# Patient Record
Sex: FEMALE | Race: WHITE | NOT HISPANIC OR LATINO | Employment: UNEMPLOYED | URBAN - METROPOLITAN AREA
[De-identification: names, ages, dates, MRNs, and addresses within clinical notes are randomized per-mention and may not be internally consistent; named-entity substitution may affect disease eponyms.]

---

## 2017-08-29 ENCOUNTER — LAB (OUTPATIENT)
Dept: LAB | Facility: HOSPITAL | Age: 62
End: 2017-08-29

## 2017-08-29 ENCOUNTER — TRANSCRIBE ORDERS (OUTPATIENT)
Dept: LAB | Facility: HOSPITAL | Age: 62
End: 2017-08-29

## 2017-08-29 ENCOUNTER — INFUSION (OUTPATIENT)
Dept: ONCOLOGY | Facility: HOSPITAL | Age: 62
End: 2017-08-29

## 2017-08-29 VITALS
TEMPERATURE: 96.3 F | BODY MASS INDEX: 41.64 KG/M2 | WEIGHT: 235 LBS | DIASTOLIC BLOOD PRESSURE: 77 MMHG | SYSTOLIC BLOOD PRESSURE: 152 MMHG | HEIGHT: 63 IN | HEART RATE: 94 BPM | RESPIRATION RATE: 18 BRPM

## 2017-08-29 DIAGNOSIS — K57.30 DIVERTICULA OF INTESTINE: Primary | ICD-10-CM

## 2017-08-29 DIAGNOSIS — E13.43 DIABETIC AUTONOMIC NEUROPATHY ASSOCIATED WITH OTHER SPECIFIED DIABETES MELLITUS (HCC): ICD-10-CM

## 2017-08-29 DIAGNOSIS — E13.8 DIABETES MELLITUS OF OTHER TYPE WITH COMPLICATION: ICD-10-CM

## 2017-08-29 DIAGNOSIS — Z88.0 PERSONAL HISTORY OF ALLERGY TO PENICILLIN: Primary | ICD-10-CM

## 2017-08-29 DIAGNOSIS — K57.32 DIVERTICULITIS OF COLON (WITHOUT MENTION OF HEMORRHAGE)(562.11): ICD-10-CM

## 2017-08-29 DIAGNOSIS — E66.01 MORBID OBESITY, UNSPECIFIED OBESITY TYPE (HCC): ICD-10-CM

## 2017-08-29 DIAGNOSIS — Z88.0 PERSONAL HISTORY OF ALLERGY TO PENICILLIN: ICD-10-CM

## 2017-08-29 LAB
ALBUMIN SERPL-MCNC: 4 G/DL (ref 3.2–4.8)
ALBUMIN/GLOB SERPL: 1.5 G/DL (ref 1.5–2.5)
ALP SERPL-CCNC: 119 U/L (ref 25–100)
ALT SERPL W P-5'-P-CCNC: 13 U/L (ref 7–40)
ANION GAP SERPL CALCULATED.3IONS-SCNC: 5 MMOL/L (ref 3–11)
AST SERPL-CCNC: 16 U/L (ref 0–33)
BASOPHILS # BLD AUTO: 0.04 10*3/MM3 (ref 0–0.2)
BASOPHILS NFR BLD AUTO: 0.5 % (ref 0–1)
BILIRUB SERPL-MCNC: 0.2 MG/DL (ref 0.3–1.2)
BUN BLD-MCNC: 13 MG/DL (ref 9–23)
BUN/CREAT SERPL: 21.7 (ref 7–25)
CALCIUM SPEC-SCNC: 9.1 MG/DL (ref 8.7–10.4)
CHLORIDE SERPL-SCNC: 103 MMOL/L (ref 99–109)
CO2 SERPL-SCNC: 29 MMOL/L (ref 20–31)
CREAT BLD-MCNC: 0.6 MG/DL (ref 0.6–1.3)
CRP SERPL-MCNC: 0.96 MG/DL (ref 0–1)
DEPRECATED RDW RBC AUTO: 46.7 FL (ref 37–54)
EOSINOPHIL # BLD AUTO: 0.39 10*3/MM3 (ref 0–0.3)
EOSINOPHIL NFR BLD AUTO: 5.3 % (ref 0–3)
ERYTHROCYTE [DISTWIDTH] IN BLOOD BY AUTOMATED COUNT: 15.2 % (ref 11.3–14.5)
ERYTHROCYTE [SEDIMENTATION RATE] IN BLOOD: 25 MM/HR (ref 0–30)
GFR SERPL CREATININE-BSD FRML MDRD: 101 ML/MIN/1.73
GLOBULIN UR ELPH-MCNC: 2.7 GM/DL
GLUCOSE BLD-MCNC: 119 MG/DL (ref 70–100)
HCT VFR BLD AUTO: 39.3 % (ref 34.5–44)
HGB BLD-MCNC: 12.2 G/DL (ref 11.5–15.5)
IMM GRANULOCYTES # BLD: 0.01 10*3/MM3 (ref 0–0.03)
IMM GRANULOCYTES NFR BLD: 0.1 % (ref 0–0.6)
LYMPHOCYTES # BLD AUTO: 2.5 10*3/MM3 (ref 0.6–4.8)
LYMPHOCYTES NFR BLD AUTO: 34.2 % (ref 24–44)
MCH RBC QN AUTO: 25.9 PG (ref 27–31)
MCHC RBC AUTO-ENTMCNC: 31 G/DL (ref 32–36)
MCV RBC AUTO: 83.4 FL (ref 80–99)
MONOCYTES # BLD AUTO: 0.52 10*3/MM3 (ref 0–1)
MONOCYTES NFR BLD AUTO: 7.1 % (ref 0–12)
NEUTROPHILS # BLD AUTO: 3.84 10*3/MM3 (ref 1.5–8.3)
NEUTROPHILS NFR BLD AUTO: 52.8 % (ref 41–71)
PLATELET # BLD AUTO: 352 10*3/MM3 (ref 150–450)
PMV BLD AUTO: 9 FL (ref 6–12)
POTASSIUM BLD-SCNC: 4.3 MMOL/L (ref 3.5–5.5)
PROT SERPL-MCNC: 6.7 G/DL (ref 5.7–8.2)
RBC # BLD AUTO: 4.71 10*6/MM3 (ref 3.89–5.14)
SODIUM BLD-SCNC: 137 MMOL/L (ref 132–146)
WBC NRBC COR # BLD: 7.3 10*3/MM3 (ref 3.5–10.8)

## 2017-08-29 PROCEDURE — 86140 C-REACTIVE PROTEIN: CPT | Performed by: INTERNAL MEDICINE

## 2017-08-29 PROCEDURE — G0463 HOSPITAL OUTPT CLINIC VISIT: HCPCS

## 2017-08-29 PROCEDURE — 85652 RBC SED RATE AUTOMATED: CPT | Performed by: INTERNAL MEDICINE

## 2017-08-29 PROCEDURE — 85025 COMPLETE CBC W/AUTO DIFF WBC: CPT | Performed by: INTERNAL MEDICINE

## 2017-08-29 PROCEDURE — 36415 COLL VENOUS BLD VENIPUNCTURE: CPT

## 2017-08-29 PROCEDURE — 80053 COMPREHEN METABOLIC PANEL: CPT | Performed by: INTERNAL MEDICINE

## 2017-08-29 RX ORDER — DULOXETIN HYDROCHLORIDE 30 MG/1
30 CAPSULE, DELAYED RELEASE ORAL DAILY
COMMUNITY
End: 2022-12-07

## 2017-08-29 RX ORDER — ATORVASTATIN CALCIUM 20 MG/1
20 TABLET, FILM COATED ORAL DAILY
COMMUNITY

## 2017-08-29 RX ORDER — CETIRIZINE HYDROCHLORIDE 10 MG/1
10 TABLET ORAL DAILY
COMMUNITY
End: 2022-03-18

## 2017-09-05 ENCOUNTER — LAB (OUTPATIENT)
Dept: LAB | Facility: HOSPITAL | Age: 62
End: 2017-09-05
Attending: INTERNAL MEDICINE

## 2017-09-05 DIAGNOSIS — K57.32 DIVERTICULITIS OF COLON (WITHOUT MENTION OF HEMORRHAGE)(562.11): Primary | ICD-10-CM

## 2017-09-05 LAB
ALBUMIN SERPL-MCNC: 4.2 G/DL (ref 3.2–4.8)
ALBUMIN/GLOB SERPL: 1.5 G/DL (ref 1.5–2.5)
ALP SERPL-CCNC: 116 U/L (ref 25–100)
ALT SERPL W P-5'-P-CCNC: 17 U/L (ref 7–40)
ANION GAP SERPL CALCULATED.3IONS-SCNC: 10 MMOL/L (ref 3–11)
AST SERPL-CCNC: 20 U/L (ref 0–33)
BASOPHILS # BLD AUTO: 0.06 10*3/MM3 (ref 0–0.2)
BASOPHILS NFR BLD AUTO: 0.7 % (ref 0–1)
BILIRUB SERPL-MCNC: 0.2 MG/DL (ref 0.3–1.2)
BUN BLD-MCNC: 15 MG/DL (ref 9–23)
BUN/CREAT SERPL: 21.4 (ref 7–25)
CALCIUM SPEC-SCNC: 9.4 MG/DL (ref 8.7–10.4)
CHLORIDE SERPL-SCNC: 100 MMOL/L (ref 99–109)
CO2 SERPL-SCNC: 25 MMOL/L (ref 20–31)
CREAT BLD-MCNC: 0.7 MG/DL (ref 0.6–1.3)
CRP SERPL-MCNC: 1.01 MG/DL (ref 0–1)
DEPRECATED RDW RBC AUTO: 45.8 FL (ref 37–54)
EOSINOPHIL # BLD AUTO: 0.44 10*3/MM3 (ref 0–0.3)
EOSINOPHIL NFR BLD AUTO: 5.1 % (ref 0–3)
ERYTHROCYTE [DISTWIDTH] IN BLOOD BY AUTOMATED COUNT: 15.2 % (ref 11.3–14.5)
ERYTHROCYTE [SEDIMENTATION RATE] IN BLOOD: 28 MM/HR (ref 0–30)
GFR SERPL CREATININE-BSD FRML MDRD: 85 ML/MIN/1.73
GLOBULIN UR ELPH-MCNC: 2.8 GM/DL
GLUCOSE BLD-MCNC: 160 MG/DL (ref 70–100)
HCT VFR BLD AUTO: 40.7 % (ref 34.5–44)
HGB BLD-MCNC: 12.6 G/DL (ref 11.5–15.5)
IMM GRANULOCYTES # BLD: 0.02 10*3/MM3 (ref 0–0.03)
IMM GRANULOCYTES NFR BLD: 0.2 % (ref 0–0.6)
LYMPHOCYTES # BLD AUTO: 2.49 10*3/MM3 (ref 0.6–4.8)
LYMPHOCYTES NFR BLD AUTO: 28.7 % (ref 24–44)
MCH RBC QN AUTO: 25.6 PG (ref 27–31)
MCHC RBC AUTO-ENTMCNC: 31 G/DL (ref 32–36)
MCV RBC AUTO: 82.7 FL (ref 80–99)
MONOCYTES # BLD AUTO: 0.6 10*3/MM3 (ref 0–1)
MONOCYTES NFR BLD AUTO: 6.9 % (ref 0–12)
NEUTROPHILS # BLD AUTO: 5.06 10*3/MM3 (ref 1.5–8.3)
NEUTROPHILS NFR BLD AUTO: 58.4 % (ref 41–71)
PLATELET # BLD AUTO: 372 10*3/MM3 (ref 150–450)
PMV BLD AUTO: 9.1 FL (ref 6–12)
POTASSIUM BLD-SCNC: 4.4 MMOL/L (ref 3.5–5.5)
PROT SERPL-MCNC: 7 G/DL (ref 5.7–8.2)
RBC # BLD AUTO: 4.92 10*6/MM3 (ref 3.89–5.14)
SODIUM BLD-SCNC: 135 MMOL/L (ref 132–146)
WBC NRBC COR # BLD: 8.67 10*3/MM3 (ref 3.5–10.8)

## 2017-09-05 PROCEDURE — 85652 RBC SED RATE AUTOMATED: CPT | Performed by: INTERNAL MEDICINE

## 2017-09-05 PROCEDURE — 80053 COMPREHEN METABOLIC PANEL: CPT | Performed by: INTERNAL MEDICINE

## 2017-09-05 PROCEDURE — 85025 COMPLETE CBC W/AUTO DIFF WBC: CPT | Performed by: INTERNAL MEDICINE

## 2017-09-05 PROCEDURE — 86140 C-REACTIVE PROTEIN: CPT | Performed by: INTERNAL MEDICINE

## 2017-09-05 PROCEDURE — 36415 COLL VENOUS BLD VENIPUNCTURE: CPT

## 2022-03-18 ENCOUNTER — OFFICE VISIT (OUTPATIENT)
Dept: PULMONOLOGY | Facility: CLINIC | Age: 67
End: 2022-03-18

## 2022-03-18 VITALS
OXYGEN SATURATION: 97 % | WEIGHT: 257 LBS | BODY MASS INDEX: 45.54 KG/M2 | DIASTOLIC BLOOD PRESSURE: 66 MMHG | HEIGHT: 63 IN | TEMPERATURE: 97.8 F | HEART RATE: 66 BPM | SYSTOLIC BLOOD PRESSURE: 122 MMHG

## 2022-03-18 DIAGNOSIS — R06.02 SHORTNESS OF BREATH: Primary | ICD-10-CM

## 2022-03-18 DIAGNOSIS — R91.8 PULMONARY INFILTRATES: ICD-10-CM

## 2022-03-18 DIAGNOSIS — J45.40 MODERATE PERSISTENT ASTHMATIC BRONCHITIS WITHOUT COMPLICATION: ICD-10-CM

## 2022-03-18 DIAGNOSIS — E66.01 OBESITY, MORBID: ICD-10-CM

## 2022-03-18 PROCEDURE — 99204 OFFICE O/P NEW MOD 45 MIN: CPT | Performed by: NURSE PRACTITIONER

## 2022-03-18 RX ORDER — FEXOFENADINE HCL 180 MG/1
180 TABLET ORAL DAILY
Qty: 30 TABLET | Refills: 5 | Status: SHIPPED | OUTPATIENT
Start: 2022-03-18

## 2022-03-18 RX ORDER — INSULIN GLARGINE 300 U/ML
INJECTION, SOLUTION SUBCUTANEOUS
COMMUNITY
Start: 2022-02-21

## 2022-03-18 RX ORDER — OMEPRAZOLE 40 MG/1
CAPSULE, DELAYED RELEASE ORAL
COMMUNITY
Start: 2022-03-12

## 2022-03-18 RX ORDER — MONTELUKAST SODIUM 10 MG/1
10 TABLET ORAL NIGHTLY
Qty: 30 TABLET | Refills: 5 | Status: SHIPPED | OUTPATIENT
Start: 2022-03-18 | End: 2023-01-17

## 2022-03-18 RX ORDER — NITROGLYCERIN 400 UG/1
SPRAY ORAL
COMMUNITY
End: 2022-07-18 | Stop reason: SDUPTHER

## 2022-03-18 RX ORDER — ALBUTEROL SULFATE 90 UG/1
2 AEROSOL, METERED RESPIRATORY (INHALATION) EVERY 4 HOURS PRN
Qty: 18 G | Refills: 11 | Status: SHIPPED | OUTPATIENT
Start: 2022-03-18 | End: 2022-08-29 | Stop reason: SDUPTHER

## 2022-03-18 RX ORDER — LISINOPRIL 40 MG/1
TABLET ORAL
COMMUNITY
End: 2022-08-15

## 2022-03-18 NOTE — PROGRESS NOTES
"Chief Complaint  Shortness of Breath    Subjective          Josefina Cheng presents to [facility] PULMONARY & CRITICAL CARE MEDICINE  History of Present Illness     Ms. Cheng is a 67 year old female with a medical history significant for diabetes, hypertension, and GERD.    She presents for evaluation of shortness of breath.  She reports that she has had shortness of breath for many years.  She tells me that she has had COVID in August 2021 and it has gotten worse.  She reports that it is worse with exertion.  She is also currently seeing cardiology.  She reports that she has been diagnosed with asthmatic bronchitis and allergies.  She quit smoking about 12 years ago.  She is currently taking Symbicort BID.          Objective   Vital Signs:   /66   Pulse 66   Temp 97.8 °F (36.6 °C) (Temporal)   Ht 158.8 cm (62.5\")   Wt 117 kg (257 lb)   SpO2 97%   BMI 46.26 kg/m²     Physical Exam     GENERAL APPEARANCE: Well developed, well nourished, alert and cooperative, and appears to be in no acute distress.    HEAD: normocephalic. Atraumatic.    EYES: PERRL, EOMI. Vision is grossly intact.    THROAT: Oral cavity and pharynx normal. No inflammation, swelling, exudate, or lesions.     NECK: Neck supple.  No thyromegaly.    CARDIAC: Normal S1 and S2. No S3, S4 or murmurs. Rhythm is regular.     RESPIRATORY:Bilateral air entry positive. Bilateral diminished breath sounds. No wheezing, crackles or rhonchi noted.    GI: Positive bowel sounds. Soft, nondistended, nontender.     MUSCULOSKELETAL: No significant deformity or joint abnormality. No edema. Peripheral pulses intact. No varicosities.    NEUROLOGICAL: Strength and sensation symmetric and intact throughout.     PSYCHIATRIC: The mental examination revealed the patient was oriented to person, place, and time.     Result Review :            Assessment and Plan    Diagnoses and all orders for this visit:    1. Shortness of breath (Primary)  -     CBC & " Differential; Future  -     Comprehensive Metabolic Panel; Future  -     IgE Level; Future  -     Full Pulmonary Function Test With Bronchodilator; Future    2. Obesity, morbid (HCC)    3. Pulmonary infiltrates  -     CT Chest Without Contrast; Future    4. Moderate persistent asthmatic bronchitis without complication    Other orders  -     fexofenadine (Allegra Allergy) 180 MG tablet; Take 1 tablet by mouth Daily.  Dispense: 30 tablet; Refill: 5  -     albuterol sulfate  (90 Base) MCG/ACT inhaler; Inhale 2 puffs Every 4 (Four) Hours As Needed for Wheezing.  Dispense: 18 g; Refill: 11  -     montelukast (SINGULAIR) 10 MG tablet; Take 1 tablet by mouth Every Night.  Dispense: 30 tablet; Refill: 5         Asthmatic Bronchitis/Shortness of breath:  -Ordered PFT to assess lung function.  -Will start her on albuterol every 4 hours as needed.    -continue Symbicort BID.  -Will start her on allegra once daily and encouraged her to use Flonase daily.  -Will also start her on Singulair nightly.      -Ordered CBC, CMP and IgE level.      Chest xray and CT Chest from August/September 2021 were reviewed.  We will order repeat CT Chest to assess resolution of pulmonary infiltrates.]    Patient's Body mass index is 46.26 kg/m². indicating that she is morbidly obese (BMI > 40 or > 35 with obesity - related health condition). Obesity-related health conditions include the following: hypertension and diabetes mellitus. Obesity is unchanged. BMI is is above average; BMI management plan is completed. We discussed portion control and increasing exercise..                        Follow Up   Return in about 3 months (around 6/18/2022).  Patient was given instructions and counseling regarding her condition or for health maintenance advice. Please see specific information pulled into the AVS if appropriate.

## 2022-03-22 ENCOUNTER — LAB (OUTPATIENT)
Dept: LAB | Facility: HOSPITAL | Age: 67
End: 2022-03-22

## 2022-03-22 DIAGNOSIS — R06.02 SHORTNESS OF BREATH: ICD-10-CM

## 2022-03-22 PROCEDURE — 80053 COMPREHEN METABOLIC PANEL: CPT

## 2022-03-22 PROCEDURE — 82785 ASSAY OF IGE: CPT

## 2022-03-22 PROCEDURE — 85025 COMPLETE CBC W/AUTO DIFF WBC: CPT

## 2022-03-22 PROCEDURE — 36415 COLL VENOUS BLD VENIPUNCTURE: CPT

## 2022-03-23 DIAGNOSIS — Z01.818 OTHER SPECIFIED PRE-OPERATIVE EXAMINATION: Primary | ICD-10-CM

## 2022-03-23 LAB
ALBUMIN SERPL-MCNC: 4.1 G/DL (ref 3.5–5.2)
ALBUMIN/GLOB SERPL: 1.5 G/DL
ALP SERPL-CCNC: 61 U/L (ref 39–117)
ALT SERPL W P-5'-P-CCNC: 17 U/L (ref 1–33)
ANION GAP SERPL CALCULATED.3IONS-SCNC: 10.5 MMOL/L (ref 5–15)
AST SERPL-CCNC: 19 U/L (ref 1–32)
BASOPHILS # BLD AUTO: 0.04 10*3/MM3 (ref 0–0.2)
BASOPHILS NFR BLD AUTO: 0.5 % (ref 0–1.5)
BILIRUB SERPL-MCNC: 0.2 MG/DL (ref 0–1.2)
BUN SERPL-MCNC: 9 MG/DL (ref 8–23)
BUN/CREAT SERPL: 11 (ref 7–25)
CALCIUM SPEC-SCNC: 9.3 MG/DL (ref 8.6–10.5)
CHLORIDE SERPL-SCNC: 94 MMOL/L (ref 98–107)
CO2 SERPL-SCNC: 30.5 MMOL/L (ref 22–29)
CREAT SERPL-MCNC: 0.82 MG/DL (ref 0.57–1)
DEPRECATED RDW RBC AUTO: 43.7 FL (ref 37–54)
EGFRCR SERPLBLD CKD-EPI 2021: 78 ML/MIN/1.73
EOSINOPHIL # BLD AUTO: 0.17 10*3/MM3 (ref 0–0.4)
EOSINOPHIL NFR BLD AUTO: 2 % (ref 0.3–6.2)
ERYTHROCYTE [DISTWIDTH] IN BLOOD BY AUTOMATED COUNT: 14.7 % (ref 12.3–15.4)
GLOBULIN UR ELPH-MCNC: 2.8 GM/DL
GLUCOSE SERPL-MCNC: 82 MG/DL (ref 65–99)
HCT VFR BLD AUTO: 39.3 % (ref 34–46.6)
HGB BLD-MCNC: 11.9 G/DL (ref 12–15.9)
IMM GRANULOCYTES # BLD AUTO: 0.03 10*3/MM3 (ref 0–0.05)
IMM GRANULOCYTES NFR BLD AUTO: 0.4 % (ref 0–0.5)
LYMPHOCYTES # BLD AUTO: 2.22 10*3/MM3 (ref 0.7–3.1)
LYMPHOCYTES NFR BLD AUTO: 26.5 % (ref 19.6–45.3)
MCH RBC QN AUTO: 24.7 PG (ref 26.6–33)
MCHC RBC AUTO-ENTMCNC: 30.3 G/DL (ref 31.5–35.7)
MCV RBC AUTO: 81.5 FL (ref 79–97)
MONOCYTES # BLD AUTO: 0.46 10*3/MM3 (ref 0.1–0.9)
MONOCYTES NFR BLD AUTO: 5.5 % (ref 5–12)
NEUTROPHILS NFR BLD AUTO: 5.47 10*3/MM3 (ref 1.7–7)
NEUTROPHILS NFR BLD AUTO: 65.1 % (ref 42.7–76)
NRBC BLD AUTO-RTO: 0 /100 WBC (ref 0–0.2)
PLATELET # BLD AUTO: 458 10*3/MM3 (ref 140–450)
PMV BLD AUTO: 9.4 FL (ref 6–12)
POTASSIUM SERPL-SCNC: 5.2 MMOL/L (ref 3.5–5.2)
PROT SERPL-MCNC: 6.9 G/DL (ref 6–8.5)
RBC # BLD AUTO: 4.82 10*6/MM3 (ref 3.77–5.28)
SODIUM SERPL-SCNC: 135 MMOL/L (ref 136–145)
WBC NRBC COR # BLD: 8.39 10*3/MM3 (ref 3.4–10.8)

## 2022-03-27 LAB — IGE SERPL-ACNC: 3 IU/ML (ref 6–495)

## 2022-03-31 ENCOUNTER — DOCUMENTATION (OUTPATIENT)
Dept: PULMONOLOGY | Facility: CLINIC | Age: 67
End: 2022-03-31

## 2022-03-31 NOTE — PROGRESS NOTES
Discussed lab work with the patient in detail.  She is a candidate for a biologic for her asthma, as it is still uncontrolled.  We will start approval process for nucala.

## 2022-04-12 ENCOUNTER — HOSPITAL ENCOUNTER (OUTPATIENT)
Dept: CT IMAGING | Facility: HOSPITAL | Age: 67
Discharge: HOME | End: 2022-04-12

## 2022-04-12 ENCOUNTER — HOSPITAL ENCOUNTER (OUTPATIENT)
Dept: RESPIRATORY THERAPY | Facility: HOSPITAL | Age: 67
Discharge: HOME | End: 2022-04-12

## 2022-04-12 DIAGNOSIS — R91.8 PULMONARY INFILTRATES: ICD-10-CM

## 2022-04-12 DIAGNOSIS — R06.02 SHORTNESS OF BREATH: ICD-10-CM

## 2022-04-12 PROCEDURE — 71250 CT THORAX DX C-: CPT | Performed by: RADIOLOGY

## 2022-04-12 PROCEDURE — 94060 EVALUATION OF WHEEZING: CPT | Performed by: INTERNAL MEDICINE

## 2022-04-12 PROCEDURE — 94060 EVALUATION OF WHEEZING: CPT

## 2022-04-12 PROCEDURE — 94726 PLETHYSMOGRAPHY LUNG VOLUMES: CPT

## 2022-04-12 PROCEDURE — 94729 DIFFUSING CAPACITY: CPT

## 2022-04-12 PROCEDURE — 71250 CT THORAX DX C-: CPT

## 2022-04-12 PROCEDURE — 94729 DIFFUSING CAPACITY: CPT | Performed by: INTERNAL MEDICINE

## 2022-04-12 PROCEDURE — 94640 AIRWAY INHALATION TREATMENT: CPT

## 2022-04-12 PROCEDURE — 94726 PLETHYSMOGRAPHY LUNG VOLUMES: CPT | Performed by: INTERNAL MEDICINE

## 2022-04-12 RX ORDER — ALBUTEROL SULFATE 2.5 MG/3ML
2.5 SOLUTION RESPIRATORY (INHALATION) ONCE
Status: COMPLETED | OUTPATIENT
Start: 2022-04-12 | End: 2022-04-12

## 2022-04-12 RX ADMIN — ALBUTEROL SULFATE 2.5 MG: 2.5 SOLUTION RESPIRATORY (INHALATION) at 13:15

## 2022-04-22 ENCOUNTER — DOCUMENTATION (OUTPATIENT)
Dept: PULMONOLOGY | Facility: CLINIC | Age: 67
End: 2022-04-22

## 2022-05-23 ENCOUNTER — OFFICE VISIT (OUTPATIENT)
Dept: PULMONOLOGY | Facility: CLINIC | Age: 67
End: 2022-05-23

## 2022-05-23 VITALS
OXYGEN SATURATION: 97 % | HEIGHT: 63 IN | DIASTOLIC BLOOD PRESSURE: 82 MMHG | TEMPERATURE: 97.5 F | SYSTOLIC BLOOD PRESSURE: 122 MMHG | BODY MASS INDEX: 46.78 KG/M2 | WEIGHT: 264 LBS | HEART RATE: 88 BPM

## 2022-05-23 DIAGNOSIS — Z87.891 FORMER SMOKER: ICD-10-CM

## 2022-05-23 DIAGNOSIS — J96.11 CHRONIC RESPIRATORY FAILURE WITH HYPOXIA: ICD-10-CM

## 2022-05-23 DIAGNOSIS — J45.40 MODERATE PERSISTENT ASTHMATIC BRONCHITIS WITHOUT COMPLICATION: Primary | ICD-10-CM

## 2022-05-23 PROCEDURE — 99214 OFFICE O/P EST MOD 30 MIN: CPT | Performed by: PHYSICIAN ASSISTANT

## 2022-05-23 RX ORDER — IPRATROPIUM BROMIDE AND ALBUTEROL SULFATE 2.5; .5 MG/3ML; MG/3ML
3 SOLUTION RESPIRATORY (INHALATION) 4 TIMES DAILY PRN
Qty: 360 ML | Refills: 8 | Status: SHIPPED | OUTPATIENT
Start: 2022-05-23

## 2022-05-23 RX ORDER — GUAIFENESIN 600 MG/1
600 TABLET, EXTENDED RELEASE ORAL 2 TIMES DAILY PRN
Qty: 28 TABLET | Refills: 2 | Status: SHIPPED | OUTPATIENT
Start: 2022-05-23 | End: 2022-06-06

## 2022-06-18 PROBLEM — Z87.891 FORMER SMOKER: Status: ACTIVE | Noted: 2022-06-18

## 2022-06-18 PROBLEM — J45.909 ASTHMATIC BRONCHITIS: Status: ACTIVE | Noted: 2022-06-18

## 2022-06-18 PROBLEM — J96.21 ACUTE ON CHRONIC RESPIRATORY FAILURE WITH HYPOXIA: Status: ACTIVE | Noted: 2022-06-18

## 2022-07-18 ENCOUNTER — LAB (OUTPATIENT)
Dept: LAB | Facility: HOSPITAL | Age: 67
End: 2022-07-18

## 2022-07-18 ENCOUNTER — OFFICE VISIT (OUTPATIENT)
Dept: CARDIOLOGY | Facility: CLINIC | Age: 67
End: 2022-07-18

## 2022-07-18 ENCOUNTER — HOSPITAL ENCOUNTER (OUTPATIENT)
Dept: GENERAL RADIOLOGY | Facility: HOSPITAL | Age: 67
Discharge: HOME | End: 2022-07-18

## 2022-07-18 VITALS
SYSTOLIC BLOOD PRESSURE: 133 MMHG | BODY MASS INDEX: 48.84 KG/M2 | OXYGEN SATURATION: 97 % | DIASTOLIC BLOOD PRESSURE: 73 MMHG | HEIGHT: 62 IN | WEIGHT: 265.4 LBS | HEART RATE: 68 BPM

## 2022-07-18 DIAGNOSIS — I10 ESSENTIAL HYPERTENSION: ICD-10-CM

## 2022-07-18 DIAGNOSIS — R06.09 DYSPNEA ON EXERTION: Primary | ICD-10-CM

## 2022-07-18 DIAGNOSIS — R07.2 PRECORDIAL PAIN: ICD-10-CM

## 2022-07-18 DIAGNOSIS — R06.09 DYSPNEA ON EXERTION: ICD-10-CM

## 2022-07-18 PROCEDURE — 80053 COMPREHEN METABOLIC PANEL: CPT

## 2022-07-18 PROCEDURE — 83880 ASSAY OF NATRIURETIC PEPTIDE: CPT

## 2022-07-18 PROCEDURE — 99204 OFFICE O/P NEW MOD 45 MIN: CPT | Performed by: INTERNAL MEDICINE

## 2022-07-18 PROCEDURE — 93000 ELECTROCARDIOGRAM COMPLETE: CPT | Performed by: INTERNAL MEDICINE

## 2022-07-18 PROCEDURE — 71046 X-RAY EXAM CHEST 2 VIEWS: CPT | Performed by: RADIOLOGY

## 2022-07-18 PROCEDURE — 71046 X-RAY EXAM CHEST 2 VIEWS: CPT

## 2022-07-18 PROCEDURE — 36415 COLL VENOUS BLD VENIPUNCTURE: CPT

## 2022-07-18 RX ORDER — FENOFIBRATE 160 MG/1
160 TABLET ORAL DAILY
COMMUNITY

## 2022-07-18 RX ORDER — GUAIFENESIN 600 MG/1
1200 TABLET, EXTENDED RELEASE ORAL 2 TIMES DAILY
COMMUNITY
End: 2022-08-29 | Stop reason: SDUPTHER

## 2022-07-18 RX ORDER — MULTIPLE VITAMINS W/ MINERALS TAB 9MG-400MCG
1 TAB ORAL DAILY
COMMUNITY
End: 2023-01-17

## 2022-07-18 RX ORDER — PROMETHAZINE HYDROCHLORIDE 25 MG/1
25 TABLET ORAL EVERY 6 HOURS PRN
COMMUNITY

## 2022-07-18 RX ORDER — DAPAGLIFLOZIN 10 MG/1
TABLET, FILM COATED ORAL DAILY
COMMUNITY

## 2022-07-18 RX ORDER — NITROGLYCERIN 0.4 MG/1
TABLET SUBLINGUAL
Qty: 25 TABLET | Refills: 2 | Status: SHIPPED | OUTPATIENT
Start: 2022-07-18

## 2022-07-18 NOTE — PROGRESS NOTES
Nlea Shaw MD  Josefina Cheng  1955 07/18/2022    Patient Active Problem List   Diagnosis   • Asthmatic bronchitis   • Former smoker   • Acute on chronic respiratory failure with hypoxia (HCC)       Dear Nela Shaw MD:    Subjective     .    Josefina Cheng is a 67 y.o. female with the problems as listed above, presents    Chief complaint: Progressive shortness of breath since August 2021    History of Present Illness: Ms. Cheng is a pleasant 67-year-old  female with no history of known coronary artery disease, has history of longstanding diabetes mellitus (insulin-dependent), hypertension and previous history of smoking for about 40 years quit about 12 years ago, presents with complaints of having progressive dyspnea since August 2021 when she was diagnosed to have COVID 19 infection for which she was treated at [facility] for about a week.  She has been evaluated in the past by the cardiologist at [facility] including a nuclear stress test and an echo Doppler study in September 2021.  Her Lexiscan sestamibi study at that time reportedly was normal.  Her echo Doppler study on 9/15/2021 revealed normal LV chamber size, wall motion and systolic function with apparently no significant valvular abnormalities noted.  Patient is concerned that she is progressively getting short of breath and wanted to be evaluated further.      On further questioning she also gives history of having some intermittent substernal chest pains with some radiation to the back and relieved with sublingual nitroglycerin.  These were occurring at random.  They are of moderate intensity.  There is associated shortness of breath but no consistent sweating.  She reportedly had another nuclear stress test and echo Doppler study performed about 2 months ago at [facility].  We do not have access to these reports at this time.                    Allergies   Allergen Reactions    • Ciprofloxacin Hcl Hives   • Keflex [Cephalexin] Hives   • Penicillins Hives   :      Current Outpatient Medications:   •  albuterol sulfate  (90 Base) MCG/ACT inhaler, Inhale 2 puffs Every 4 (Four) Hours As Needed for Wheezing., Disp: 18 g, Rfl: 11  •  atorvastatin (LIPITOR) 20 MG tablet, Take 20 mg by mouth Daily., Disp: , Rfl:   •  calcium citrate-vitamin d (CALCITRATE) 315-250 MG-UNIT tablet tablet, Take  by mouth Daily., Disp: , Rfl:   •  cholecalciferol (VITAMIN D3) 1.25 MG (92796 UT) capsule, Vitamin D3  Daily, Disp: , Rfl:   •  dapagliflozin Propanediol (Farxiga) 10 MG tablet, Take  by mouth Daily., Disp: , Rfl:   •  fenofibrate 160 MG tablet, Take 160 mg by mouth Daily., Disp: , Rfl:   •  fexofenadine (Allegra Allergy) 180 MG tablet, Take 1 tablet by mouth Daily., Disp: 30 tablet, Rfl: 5  •  guaiFENesin (MUCINEX) 600 MG 12 hr tablet, Take 1,200 mg by mouth 2 (Two) Times a Day., Disp: , Rfl:   •  insulin aspart (novoLOG) 100 UNIT/ML injection, Inject  under the skin 3 (Three) Times a Day Before Meals., Disp: , Rfl:   •  ipratropium-albuterol (DUO-NEB) 0.5-2.5 mg/3 ml nebulizer, Take 3 mL by nebulization 4 (Four) Times a Day As Needed for Wheezing or Shortness of Air., Disp: 360 mL, Rfl: 8  •  metFORMIN (GLUCOPHAGE) 1000 MG tablet, Take 500 mg by mouth 2 (Two) Times a Day With Meals., Disp: , Rfl:   •  metoprolol tartrate (LOPRESSOR) 25 MG tablet, Every 12 (Twelve) Hours., Disp: , Rfl:   •  montelukast (SINGULAIR) 10 MG tablet, Take 1 tablet by mouth Every Night., Disp: 30 tablet, Rfl: 5  •  multivitamin with minerals tablet tablet, Take 1 tablet by mouth Daily., Disp: , Rfl:   •  nitroglycerin (NITROLINGUAL) 0.4 MG/SPRAY spray, Nitroglycerin 0.4 MG/SPRAY Translingual Solution   0 N  28-Feb-2014   Active, Disp: , Rfl:   •  omeprazole (priLOSEC) 40 MG capsule, , Disp: , Rfl:   •  promethazine (PHENERGAN) 25 MG tablet, Take 25 mg by mouth Every 6 (Six) Hours As Needed for Nausea or Vomiting., Disp: ,  Rfl:   •  Vanna Hogue SoloStar 300 UNIT/ML solution pen-injector injection, INJECT 54 UNITS UNDER THE SKIN IN THE MORNING AND 54 UNITS IN THE EVENING, Disp: , Rfl:   •  ascorbic acid (VITAMIN C) 1000 MG tablet, Take 1,000 mg by mouth Daily., Disp: , Rfl:   •  cyanocobalamin (VITAMIN B-12) 1000 MCG tablet, Take  by mouth., Disp: , Rfl:   •  DULoxetine (CYMBALTA) 30 MG capsule, Take 30 mg by mouth Daily., Disp: , Rfl:   •  lisinopril (PRINIVIL,ZESTRIL) 40 MG tablet, lisinopril 40 mg tablet  Take 1 tablet every day by oral route in the morning for 90 days., Disp: , Rfl:     Past Medical History:   Diagnosis Date   • Diabetes mellitus (HCC)    • Diverticulitis    • Hypertension      Past Surgical History:   Procedure Laterality Date   • ANKLE SURGERY     • BACK SURGERY     • CHOLECYSTECTOMY     • HAND SURGERY     • HERNIA REPAIR     • HYSTERECTOMY     • KNEE SURGERY     • TUBAL ABDOMINAL LIGATION       Family History   Problem Relation Age of Onset   • Cancer Father    • Cancer Sister      Social History     Tobacco Use   • Smoking status: Former Smoker     Packs/day: 2.00     Years: 12.00     Pack years: 24.00     Types: Cigarettes   • Smokeless tobacco: Never Used   Vaping Use   • Vaping Use: Never used   Substance Use Topics   • Alcohol use: Never   • Drug use: Never       Review of Systems   Constitutional: Positive for malaise/fatigue. Negative for chills, diaphoresis and fever.   Eyes:        GLASSES/CONTACTS   Cardiovascular: Positive for leg swelling and palpitations. Negative for chest pain, orthopnea and paroxysmal nocturnal dyspnea.   Respiratory: Positive for cough, shortness of breath and wheezing. Negative for hemoptysis.         SINUS   Endocrine: Negative for cold intolerance and heat intolerance.        Diabetes   Hematologic/Lymphatic: Positive for bleeding problem. Bruises/bleeds easily.   Skin: Positive for dry skin. Negative for rash.   Musculoskeletal: Positive for back pain and joint pain. Negative  "for myalgias.        DIFFICULTY WALKING   Gastrointestinal: Positive for diarrhea, nausea and vomiting. Negative for abdominal pain and constipation.        LOSS OF APPETITE   Genitourinary: Negative for dysuria and hematuria.   Neurological: Negative for dizziness, focal weakness and numbness.     Objective   Blood pressure 133/73, pulse 68, height 157.5 cm (62\"), weight 120 kg (265 lb 6.4 oz), SpO2 97 %.  Body mass index is 48.54 kg/m².    Constitutional:       Appearance: Well-developed.   Eyes:      Conjunctiva/sclera: Conjunctivae normal.   HENT:      Head: Normocephalic.   Neck:      Thyroid: No thyromegaly.      Vascular: No JVD.      Trachea: No tracheal deviation.   Pulmonary:      Effort: No respiratory distress.      Breath sounds: Wheezing (Mild expiratory wheezing bilaterally.) present. No rales.   Cardiovascular:      PMI at left midclavicular line. Normal rate. Regular rhythm. Normal S1. Normal S2.      Murmurs: There is no murmur.      No gallop. No click. No rub.   Pulses:     Carotid: 2+ bilaterally.     Radial: 2+ bilaterally.     Dorsalis pedis: 1+ bilaterally.     Posterior tibial: 1+ bilaterally.  Edema:     Pretibial: bilateral 1+ edema of the pretibial area.     Ankle: bilateral 1+ edema of the ankle.     Feet: bilateral 1+ edema of the feet.  Abdominal:      General: Bowel sounds are normal.      Palpations: Abdomen is soft. There is no abdominal mass.      Tenderness: There is no abdominal tenderness.   Musculoskeletal:      Cervical back: Normal range of motion and neck supple. Skin:     General: Skin is warm and dry.   Neurological:      Mental Status: Alert and oriented to person, place, and time.      Cranial Nerves: No cranial nerve deficit.       Lab Results   Component Value Date     (L) 03/22/2022    K 5.2 03/22/2022    CL 94 (L) 03/22/2022    CO2 30.5 (H) 03/22/2022    BUN 9 03/22/2022    CREATININE 0.82 03/22/2022    GLUCOSE 82 03/22/2022    CALCIUM 9.3 03/22/2022    AST 19 " 03/22/2022    ALT 17 03/22/2022    ALKPHOS 61 03/22/2022     No results found for: CKTOTAL  Lab Results   Component Value Date    WBC 8.39 03/22/2022    HGB 11.9 (L) 03/22/2022    HCT 39.3 03/22/2022     (H) 03/22/2022       ECG 12 Lead    Date/Time: 7/18/2022 11:07 AM  Performed by: Kris Araujo MD  Authorized by: Kris Araujo MD   Previous ECG: no previous ECG available  Rhythm: sinus rhythm  Conduction: conduction normal  ST Segments: ST segments normal  T Waves: T waves normal  Comments: Poor R wave progression across leads V1 through V5.              Assessment & Plan :   Diagnosis Plan   1. Dyspnea on exertion  BNP, CMP   2. Essential hypertension     3. Precordial pain         Recommendations:  Orders Placed This Encounter   Procedures   • BNP   • Comprehensive Metabolic Panel   • ECG 12 Lead      1. We will try to get her recent nuclear stress test and echo Doppler study reports from [facility] or from Dr. Shaw's office.  2. We will obtain a proBNP and chest x-ray and consider further diuretic therapy accordingly.    3. I have asked her to restrict fluid intake to 1 and half liters a day to avoid hyponatremia.    Return in about 3 weeks (around 8/8/2022) for or sooner if needed.    As always, I appreciate very much the opportunity to participate in the cardiovascular care of your patients.      With Best Regards,    Kris Araujo MD, [facility]    Dragon disclaimer:  Much of this encounter note is an electronic transcription/translation of spoken language to printed text. The electronic translation of spoken language may permit erroneous, or at times, nonsensical words or phrases to be inadvertently transcribed; Although I have reviewed the note for such errors, some may still exist.

## 2022-07-19 LAB
ALBUMIN SERPL-MCNC: 3.9 G/DL (ref 3.5–5.2)
ALBUMIN/GLOB SERPL: 1.5 G/DL
ALP SERPL-CCNC: 64 U/L (ref 39–117)
ALT SERPL W P-5'-P-CCNC: 12 U/L (ref 1–33)
ANION GAP SERPL CALCULATED.3IONS-SCNC: 14 MMOL/L (ref 5–15)
AST SERPL-CCNC: 20 U/L (ref 1–32)
BILIRUB SERPL-MCNC: 0.2 MG/DL (ref 0–1.2)
BUN SERPL-MCNC: 10 MG/DL (ref 8–23)
BUN/CREAT SERPL: 12.2 (ref 7–25)
CALCIUM SPEC-SCNC: 8.9 MG/DL (ref 8.6–10.5)
CHLORIDE SERPL-SCNC: 93 MMOL/L (ref 98–107)
CO2 SERPL-SCNC: 26 MMOL/L (ref 22–29)
CREAT SERPL-MCNC: 0.82 MG/DL (ref 0.57–1)
EGFRCR SERPLBLD CKD-EPI 2021: 78 ML/MIN/1.73
GLOBULIN UR ELPH-MCNC: 2.6 GM/DL
GLUCOSE SERPL-MCNC: 93 MG/DL (ref 65–99)
NT-PROBNP SERPL-MCNC: 115 PG/ML (ref 0–900)
POTASSIUM SERPL-SCNC: 4.1 MMOL/L (ref 3.5–5.2)
PROT SERPL-MCNC: 6.5 G/DL (ref 6–8.5)
SODIUM SERPL-SCNC: 133 MMOL/L (ref 136–145)

## 2022-07-21 ENCOUNTER — PATIENT ROUNDING (BHMG ONLY) (OUTPATIENT)
Dept: CARDIOLOGY | Facility: CLINIC | Age: 67
End: 2022-07-21

## 2022-07-21 NOTE — PROGRESS NOTES
July 21, 2022    Hello, may I speak with Josefina Cheng?    My name is Margarita      I am  with [facility]  [facility]  [address] [place] [address] [phone].    Before we get started may I verify your date of birth? 1955    I am calling to officially welcome you to our practice and ask about your recent visit. Is this a good time to talk? YES     Tell me about your visit with us. What things went well?  Everything went good.  I would recommend .       We're always looking for ways to make our patients' experiences even better. Do you have recommendations on ways we may improve?  NO    Overall were you satisfied with your first visit to our practice? YES        I appreciate you taking the time to speak with me today. Is there anything else I can do for you? NO    Thank you, and have a great day.

## 2022-08-15 ENCOUNTER — TELEPHONE (OUTPATIENT)
Dept: CARDIOLOGY | Facility: CLINIC | Age: 67
End: 2022-08-15

## 2022-08-15 ENCOUNTER — OFFICE VISIT (OUTPATIENT)
Dept: CARDIOLOGY | Facility: CLINIC | Age: 67
End: 2022-08-15

## 2022-08-15 VITALS
WEIGHT: 262.4 LBS | BODY MASS INDEX: 48.29 KG/M2 | SYSTOLIC BLOOD PRESSURE: 125 MMHG | DIASTOLIC BLOOD PRESSURE: 67 MMHG | HEIGHT: 62 IN | RESPIRATION RATE: 16 BRPM | HEART RATE: 77 BPM

## 2022-08-15 DIAGNOSIS — I10 PRIMARY HYPERTENSION: Primary | ICD-10-CM

## 2022-08-15 PROCEDURE — 99213 OFFICE O/P EST LOW 20 MIN: CPT | Performed by: PHYSICIAN ASSISTANT

## 2022-08-15 NOTE — TELEPHONE ENCOUNTER
Called PCP and left message with medical records. Called [facility] and requested her stress and echo. They were both done back in March of 2022.

## 2022-08-15 NOTE — PROGRESS NOTES
Nela Shaw MD  Josefina Cheng  1955  08/15/2022    Patient Active Problem List   Diagnosis   • Asthmatic bronchitis   • Former smoker   • Acute on chronic respiratory failure with hypoxia (HCC)   • Primary hypertension       Dear Nela Shaw MD:    Subjective     History of Present Illness:    Chief Complaint   Patient presents with   • Follow-up     Chest x ray and lab   • Surgical Clearance     Gastric s stimulator   • Palpitations     occas   • Shortness of Breath     unchanged   • Edema     feet   • Med Management     List provided       Josefina Cheng is a pleasant 67 y.o. female with a past medical history significant for no known coronary artery disease.  However she does have longstanding diabetes.  She also has history of tobacco abuse but did quit in 2010.  She also has essential hypertension and dyslipidemia.  She comes in today for cardiology follow-up.    She reports she has done fairly well since she was last seen she denies any overt chest pains, shortness of breath worsening from her baseline, she does have some minor palpitations but reports these do not affect her ADLs.  She also denies any syncope or near syncope.  Blood pressures well controlled in the office today she reports has been controlled at home as well.    Allergies   Allergen Reactions   • Ciprofloxacin Hcl Hives   • Keflex [Cephalexin] Hives   • Penicillins Hives   :      Current Outpatient Medications:   •  albuterol sulfate  (90 Base) MCG/ACT inhaler, Inhale 2 puffs Every 4 (Four) Hours As Needed for Wheezing., Disp: 18 g, Rfl: 11  •  atorvastatin (LIPITOR) 20 MG tablet, Take 20 mg by mouth Daily., Disp: , Rfl:   •  calcium citrate-vitamin d (CALCITRATE) 315-250 MG-UNIT tablet tablet, Take  by mouth Daily., Disp: , Rfl:   •  cholecalciferol (VITAMIN D3) 1.25 MG (60991 UT) capsule, Vitamin D3  Daily, Disp: , Rfl:   •  cyanocobalamin (VITAMIN B-12) 1000 MCG tablet, Take  by mouth., Disp: , Rfl:   •   dapagliflozin Propanediol (Farxiga) 10 MG tablet, Take  by mouth Daily., Disp: , Rfl:   •  fenofibrate 160 MG tablet, Take 160 mg by mouth Daily., Disp: , Rfl:   •  fexofenadine (Allegra Allergy) 180 MG tablet, Take 1 tablet by mouth Daily., Disp: 30 tablet, Rfl: 5  •  guaiFENesin (MUCINEX) 600 MG 12 hr tablet, Take 1,200 mg by mouth 2 (Two) Times a Day., Disp: , Rfl:   •  insulin aspart (novoLOG) 100 UNIT/ML injection, Inject  under the skin 3 (Three) Times a Day Before Meals., Disp: , Rfl:   •  ipratropium-albuterol (DUO-NEB) 0.5-2.5 mg/3 ml nebulizer, Take 3 mL by nebulization 4 (Four) Times a Day As Needed for Wheezing or Shortness of Air., Disp: 360 mL, Rfl: 8  •  metFORMIN (GLUCOPHAGE) 1000 MG tablet, Take 500 mg by mouth 2 (Two) Times a Day With Meals., Disp: , Rfl:   •  metoprolol tartrate (LOPRESSOR) 25 MG tablet, 37.5 mg 2 (Two) Times a Day. Takes 1& 1/2 of 25mg tab per dose, Disp: , Rfl:   •  multivitamin with minerals tablet tablet, Take 1 tablet by mouth Daily., Disp: , Rfl:   •  nitroglycerin (NITROSTAT) 0.4 MG SL tablet, 1 under the tongue as needed for angina, may repeat q5mins for up three doses, Disp: 25 tablet, Rfl: 2  •  omeprazole (priLOSEC) 40 MG capsule, , Disp: , Rfl:   •  promethazine (PHENERGAN) 25 MG tablet, Take 25 mg by mouth Every 6 (Six) Hours As Needed for Nausea or Vomiting., Disp: , Rfl:   •  Toujeo Max SoloStar 300 UNIT/ML solution pen-injector injection, INJECT 54 UNITS UNDER THE SKIN IN THE MORNING AND 54 UNITS IN THE EVENING, Disp: , Rfl:   •  ascorbic acid (VITAMIN C) 1000 MG tablet, Take 1,000 mg by mouth Daily., Disp: , Rfl:   •  DULoxetine (CYMBALTA) 30 MG capsule, Take 30 mg by mouth Daily., Disp: , Rfl:   •  montelukast (SINGULAIR) 10 MG tablet, Take 1 tablet by mouth Every Night., Disp: 30 tablet, Rfl: 5    The following portions of the patient's history were reviewed and updated as appropriate: allergies, current medications, past family history, past medical history,  "past social history, past surgical history and problem list.    Social History     Tobacco Use   • Smoking status: Former Smoker     Packs/day: 2.00     Years: 12.00     Pack years: 24.00     Types: Cigarettes     Quit date:      Years since quittin.6   • Smokeless tobacco: Never Used   Vaping Use   • Vaping Use: Never used   Substance Use Topics   • Alcohol use: Never   • Drug use: Never         Objective   Vitals:    08/15/22 1038   BP: 125/67   Pulse: 77   Resp: 16   Weight: 119 kg (262 lb 6.4 oz)   Height: 157.5 cm (62\")     Body mass index is 47.99 kg/m².    Constitutional:       General: Not in acute distress.     Appearance: Healthy appearance. Well-developed and not in distress. Not diaphoretic.   Eyes:      Conjunctiva/sclera: Conjunctivae normal.      Pupils: Pupils are equal, round, and reactive to light.   HENT:      Head: Normocephalic and atraumatic.   Neck:      Vascular: No carotid bruit or JVD.   Pulmonary:      Effort: Pulmonary effort is normal. No respiratory distress.      Breath sounds: Normal breath sounds.   Cardiovascular:      Normal rate. Regular rhythm.   Skin:     General: Skin is cool.   Neurological:      Mental Status: Alert, oriented to person, place, and time and oriented to person, place and time.         Lab Results   Component Value Date     (L) 2022    K 4.1 2022    CL 93 (L) 2022    CO2 26.0 2022    BUN 10 2022    CREATININE 0.82 2022    GLUCOSE 93 2022    CALCIUM 8.9 2022    AST 20 2022    ALT 12 2022    ALKPHOS 64 2022     No results found for: CKTOTAL  Lab Results   Component Value Date    WBC 8.39 2022    HGB 11.9 (L) 2022    HCT 39.3 2022     (H) 2022     Lab Results   Component Value Date    INR 1.01 2022     No results found for: MG  No results found for: TSH, PSA, CHLPL, TRIG, HDL, LDL   No results found for: BNP    During this visit the following were " done:  Labs Reviewed []    Labs Ordered []    Radiology Reports Reviewed []    Radiology Ordered []    PCP Records Reviewed []    Referring Provider Records Reviewed []    ER Records Reviewed []    Hospital Records Reviewed []    History Obtained From Family []    Radiology Images Reviewed []    Other Reviewed []    Records Requested []       Procedures    Assessment & Plan    Diagnosis Plan   1. Primary hypertension          Recommendations:  1. Surgical risk evaluation  1. Discussed the case thoroughly with Dr. Araujo reviewed recent stress test in April 2022 at [facility].  Since she is completely asymptomatic at this time with no known history of coronary artery disease can clear her for gastric stimulator generator change.      No follow-ups on file.    As always, I appreciate very much the opportunity to participate in the cardiovascular care of your patients.      With Best Regards,    Jay Jones PA-C

## 2022-08-15 NOTE — TELEPHONE ENCOUNTER
Patient informed that Jay has been unable to to reach Dr. Araujo at this point so she can not be cleared for surgery until he can speak with him

## 2022-08-29 ENCOUNTER — OFFICE VISIT (OUTPATIENT)
Dept: PULMONOLOGY | Facility: CLINIC | Age: 67
End: 2022-08-29

## 2022-08-29 VITALS
DIASTOLIC BLOOD PRESSURE: 68 MMHG | WEIGHT: 262 LBS | BODY MASS INDEX: 48.21 KG/M2 | HEIGHT: 62 IN | TEMPERATURE: 97.3 F | SYSTOLIC BLOOD PRESSURE: 118 MMHG | OXYGEN SATURATION: 95 % | HEART RATE: 73 BPM

## 2022-08-29 DIAGNOSIS — J45.50 SEVERE PERSISTENT ASTHMATIC BRONCHITIS WITHOUT COMPLICATION: Primary | ICD-10-CM

## 2022-08-29 DIAGNOSIS — Z87.891 FORMER SMOKER: ICD-10-CM

## 2022-08-29 DIAGNOSIS — J96.11 CHRONIC RESPIRATORY FAILURE WITH HYPOXIA: ICD-10-CM

## 2022-08-29 PROBLEM — J96.21 ACUTE ON CHRONIC RESPIRATORY FAILURE WITH HYPOXIA: Status: RESOLVED | Noted: 2022-06-18 | Resolved: 2022-08-29

## 2022-08-29 PROCEDURE — 99214 OFFICE O/P EST MOD 30 MIN: CPT | Performed by: PHYSICIAN ASSISTANT

## 2022-08-29 RX ORDER — BUDESONIDE AND FORMOTEROL FUMARATE DIHYDRATE 160; 4.5 UG/1; UG/1
2 AEROSOL RESPIRATORY (INHALATION)
Qty: 1 EACH | Refills: 12 | Status: SHIPPED | OUTPATIENT
Start: 2022-08-29

## 2022-08-29 RX ORDER — ALBUTEROL SULFATE 90 UG/1
2 AEROSOL, METERED RESPIRATORY (INHALATION) EVERY 4 HOURS PRN
Qty: 18 G | Refills: 8 | Status: SHIPPED | OUTPATIENT
Start: 2022-08-29

## 2022-08-29 RX ORDER — METFORMIN HYDROCHLORIDE 500 MG/1
TABLET, EXTENDED RELEASE ORAL
COMMUNITY
Start: 2022-08-08

## 2022-08-29 RX ORDER — ONDANSETRON 4 MG/1
TABLET, FILM COATED ORAL EVERY 12 HOURS SCHEDULED
COMMUNITY

## 2022-08-29 RX ORDER — GUAIFENESIN 600 MG/1
600 TABLET, EXTENDED RELEASE ORAL 2 TIMES DAILY PRN
Qty: 28 TABLET | Refills: 4 | Status: SHIPPED | OUTPATIENT
Start: 2022-08-29 | End: 2022-09-12

## 2022-08-29 NOTE — PROGRESS NOTES
"Chief Complaint  Asthma    Subjective        Josefina Cheng presents to [facility] PULMONARY & CRITICAL CARE MEDICINE  History of Present Illness    Mrs. Cheng presents today for follow up of asthmatic bronchitis, chronic hypoxic respiratory failure with as needed use of supplemental oxygen, and former smoking history.  She states that she also [place], and likely had coal dust exposure.  She had been diagnosed with asthma many years ago and used to take an oral medication for this but was unsure of the name.  Also reports frequent childhood asthma.   Symptoms typically worsen during times of warmer weather, and tries to avoid additional heat exposure when possible.  Symptoms do prevent her from doing daily activities.  Supplemental oxygen required on as-needed basis.  She continues on Symbicort, but did not have much benefit from the Spiriva inhaler trial.  She prefers to avoid oral steroids when possible due to the hyperglycemic effects, but these do provide symptomatic relief.   Recently noting some thick phlegm production, that sticks in her throat but does not feel increased chest congestion. Some difficulty with production noted.         Objective   Vital Signs:  /68 (BP Location: Right arm, Patient Position: Sitting, Cuff Size: Adult)   Pulse 73   Temp 97.3 °F (36.3 °C) (Infrared)   Ht 157.5 cm (62\")   Wt 119 kg (262 lb)   SpO2 95%   BMI 47.92 kg/m²   Estimated body mass index is 47.92 kg/m² as calculated from the following:    Height as of this encounter: 157.5 cm (62\").    Weight as of this encounter: 119 kg (262 lb).        Physical Exam  Vitals reviewed.   Constitutional:       General: She is not in acute distress.     Appearance: She is well-developed. She is not diaphoretic.   HENT:      Head: Normocephalic and atraumatic.   Cardiovascular:      Rate and Rhythm: Normal rate and regular rhythm.      Heart sounds: Normal heart sounds, S1 normal and S2 normal. "   Pulmonary:      Effort: Pulmonary effort is normal.      Breath sounds: No wheezing, rhonchi or rales.   Neurological:      Mental Status: She is alert and oriented to person, place, and time.   Psychiatric:         Behavior: Behavior normal.        Result Review :  The following data was reviewed by: Diane Vale PA-C on 08/29/2022:    Reviewed the CT chest from April 2022. m minimal scarring peers.  No pulmonary nodules.  Air trapping changes noted.    Reviewed the chest x-ray from July 2022.    Reviewed the PFT. From April 2022.  Suggestive of asthma with bronchodilator response +18 and air trapping noted.  DLCO normal.      Assessment and Plan   Diagnoses and all orders for this visit:    1. Severe persistent asthmatic bronchitis without complication (Primary)  -     guaiFENesin (MUCINEX) 600 MG 12 hr tablet; Take 1 tablet by mouth 2 (Two) Times a Day As Needed for Cough or Congestion for up to 14 days.  Dispense: 28 tablet; Refill: 4  -     budesonide-formoterol (Symbicort) 160-4.5 MCG/ACT inhaler; Inhale 2 puffs 2 (Two) Times a Day.  Dispense: 1 each; Refill: 12    2. Chronic respiratory failure with hypoxia (HCC)    3. Former smoker    Other orders  -     albuterol sulfate  (90 Base) MCG/ACT inhaler; Inhale 2 puffs Every 4 (Four) Hours As Needed for Wheezing or Shortness of Air.  Dispense: 18 g; Refill: 8        Moderate-severe asthma:  · Continue albuterol inhaler as needed  · Continue Symbicort scheduled  · Low-dose prefer not effective  · Trying Spiriva 2.5 mcg twice daily, sample provided today  · Continue duonebs treatments as needed.  · Ordered Mucinex tablets for as needed use with congestion (some current phlegm production noted)  · As she still notes significant symptoms despite maximum inhaler therapy,   ordering Tezspire monthly injections  · following with cardiology cardiology for diastolic dysfunction and mild mitral regurgitation.        Chronic hypoxic respiratory  failure:  · Compliant with use of 2 L/min as needed.        Former smoker:  · Reviewed recent CT chest from April 2022. No nodules, minimal scarring appearance.   Will plan for repeat low dose CT chest in 1 year (April 2023).         Follow Up   Return in about 3 months (around 11/29/2022), or if symptoms worsen or fail to improve, for Recheck.  Patient was given instructions and counseling regarding her condition or for health maintenance advice. Please see specific information pulled into the AVS if appropriate.           Addendum: 9/14/22  Forms completed today to request approval for Tezspire (nonformulary), as Fasenra, Nucala, and Dupixent are preferred.  Forms completed and note addendum were required.  Will be faxed to [employer] ([phone])    Patient is contraindicated for the preferred formularies due to their specific possible adverse reactions that could occur with use.   Depicts in the Cath Lab but is having increased risk of herpes viral infections.   Fasenra is less appropriate in her case due to its targeted mechanism of action, which only focuses on eosinophils directly--and would likely be a less appropriate option based off previous lab values.   The new mechanism of Tezspire allows for a more broadened response by a selecting a target that impacts multiple cascades--which in turn will still act upon eosinophils but also IgE, and other components that may also be aggravating symptoms.   Thus, this could provide the benefits of all the preferred medications, but without the specified side effect concerns and be the most appropriate option for the patient.    Patient is notable for frequent exacerbations but cannot tolerate oral steroids due to hyperglycemic complications.  She has been compliant with maintenance inhaled therapies, but these do not adequately maintain symptoms.  She notes symptoms that impair daily activities, and can worsen during specific times of the year including times  of extreme weather changes such as heat/cold, humidity.  Thus, symptoms are not only aggravated by allergens.    Due to Tezspire's  mechanism of action, its goal is to provide more response to not only allergen triggers, but also environmental/infective/pollutant triggers.  After discussion of several living options with patient including those that were preferred, she would like prefer and feel the safest proceeding with the requested medication--Tezspire.

## 2022-09-01 RX ORDER — TEZEPELUMAB-EKKO 210 MG/1.9ML
210 INJECTION, SOLUTION SUBCUTANEOUS
Qty: 1.91 ML | Refills: 5 | Status: SHIPPED | OUTPATIENT
Start: 2022-09-01 | End: 2023-03-16

## 2022-09-07 ENCOUNTER — TELEPHONE (OUTPATIENT)
Dept: PHARMACY | Facility: HOSPITAL | Age: 67
End: 2022-09-07

## 2022-09-07 NOTE — TELEPHONE ENCOUNTER
Patient notified that insurance requires her to fill Tezspire at [facility]. Prescription has been sent to them and patient states they have reached out to her.

## 2022-10-06 ENCOUNTER — CLINICAL SUPPORT (OUTPATIENT)
Dept: PULMONOLOGY | Facility: CLINIC | Age: 67
End: 2022-10-06

## 2022-10-06 DIAGNOSIS — J45.50 SEVERE PERSISTENT ASTHMATIC BRONCHITIS WITHOUT COMPLICATION: Primary | ICD-10-CM

## 2022-10-06 PROCEDURE — 96372 THER/PROPH/DIAG INJ SC/IM: CPT | Performed by: PHYSICIAN ASSISTANT

## 2022-10-06 RX ORDER — LISINOPRIL 2.5 MG/1
2.5 TABLET ORAL DAILY
COMMUNITY
Start: 2022-09-06

## 2022-11-11 ENCOUNTER — CLINICAL SUPPORT (OUTPATIENT)
Dept: PULMONOLOGY | Facility: CLINIC | Age: 67
End: 2022-11-11

## 2022-11-11 DIAGNOSIS — J45.50 SEVERE PERSISTENT ASTHMATIC BRONCHITIS WITHOUT COMPLICATION: Primary | ICD-10-CM

## 2022-11-11 PROCEDURE — 96372 THER/PROPH/DIAG INJ SC/IM: CPT | Performed by: PHYSICIAN ASSISTANT

## 2022-11-15 ENCOUNTER — OFFICE VISIT (OUTPATIENT)
Dept: CARDIOLOGY | Facility: CLINIC | Age: 67
End: 2022-11-15

## 2022-11-15 VITALS
SYSTOLIC BLOOD PRESSURE: 123 MMHG | HEART RATE: 70 BPM | BODY MASS INDEX: 46.63 KG/M2 | OXYGEN SATURATION: 96 % | HEIGHT: 62 IN | DIASTOLIC BLOOD PRESSURE: 72 MMHG | WEIGHT: 253.4 LBS

## 2022-11-15 DIAGNOSIS — R07.2 PRECORDIAL PAIN: Primary | ICD-10-CM

## 2022-11-15 DIAGNOSIS — I10 PRIMARY HYPERTENSION: ICD-10-CM

## 2022-11-15 PROCEDURE — 99214 OFFICE O/P EST MOD 30 MIN: CPT | Performed by: PHYSICIAN ASSISTANT

## 2022-11-15 RX ORDER — CLOPIDOGREL BISULFATE 75 MG/1
75 TABLET ORAL DAILY
Qty: 30 TABLET | Refills: 11 | Status: SHIPPED | OUTPATIENT
Start: 2022-11-15

## 2022-11-15 RX ORDER — VENLAFAXINE 75 MG/1
75 TABLET ORAL 2 TIMES DAILY
COMMUNITY

## 2022-11-15 RX ORDER — ISOSORBIDE MONONITRATE 30 MG/1
30 TABLET, EXTENDED RELEASE ORAL DAILY
Qty: 30 TABLET | Refills: 11 | Status: SHIPPED | OUTPATIENT
Start: 2022-11-15 | End: 2022-12-15 | Stop reason: SDUPTHER

## 2022-11-15 NOTE — PROGRESS NOTES
Nela Shaw MD  Josefina Cheng  1955  11/15/2022    Patient Active Problem List   Diagnosis   • Asthmatic bronchitis   • Former smoker   • Primary hypertension   • Chronic respiratory failure with hypoxia (HCC)       Dear Nela Shaw MD:    Subjective     History of Present Illness:    Chief Complaint   Patient presents with   • Med Management     Patient has medication list.    • Follow-up     3 mo f/u, post op.       Josefina Cheng is a pleasant 67 y.o. female with a past medical history significant for no known coronary artery disease.  However she does have longstanding diabetes.  She also has history of tobacco abuse but did quit in 2010.  She also has essential hypertension and dyslipidemia.  She comes in today for cardiology follow-up.    Josefina did have her gastric stimulator placed with no major sequelae.  Wound site is healing well with no signs of infection.  Clinically she does report a couple episodes of chest pains that she reports required 1 dose of sublingual nitroglycerin that resolved the pain.  She reported that this pain did radiate into her upper back between her shoulder blades.  She does report that this pain lasted for about 30 minutes until she took the nitroglycerin.  Of note both of these episodes did occur prior to the gastric stimulator placement.    Allergies   Allergen Reactions   • Adhesive Tape Rash   • Cephalexin Hives   • Cephalosporins Hives   • Ciprofloxacin Hcl Hives   • Penicillins Hives   :      Current Outpatient Medications:   •  albuterol sulfate  (90 Base) MCG/ACT inhaler, Inhale 2 puffs Every 4 (Four) Hours As Needed for Wheezing or Shortness of Air., Disp: 18 g, Rfl: 8  •  atorvastatin (LIPITOR) 20 MG tablet, Take 20 mg by mouth Daily., Disp: , Rfl:   •  budesonide-formoterol (Symbicort) 160-4.5 MCG/ACT inhaler, Inhale 2 puffs 2 (Two) Times a Day., Disp: 1 each, Rfl: 12  •  calcium citrate-vitamin d (CALCITRATE) 315-250 MG-UNIT tablet tablet, Take   by mouth Daily., Disp: , Rfl:   •  cholecalciferol (VITAMIN D3) 1.25 MG (68791 UT) capsule, Vitamin D3  Daily, Disp: , Rfl:   •  dapagliflozin Propanediol (Farxiga) 10 MG tablet, Take  by mouth Daily., Disp: , Rfl:   •  fenofibrate 160 MG tablet, Take 160 mg by mouth Daily., Disp: , Rfl:   •  fexofenadine (Allegra Allergy) 180 MG tablet, Take 1 tablet by mouth Daily., Disp: 30 tablet, Rfl: 5  •  insulin aspart (novoLOG) 100 UNIT/ML injection, Inject  under the skin 3 (Three) Times a Day Before Meals., Disp: , Rfl:   •  ipratropium-albuterol (DUO-NEB) 0.5-2.5 mg/3 ml nebulizer, Take 3 mL by nebulization 4 (Four) Times a Day As Needed for Wheezing or Shortness of Air., Disp: 360 mL, Rfl: 8  •  lisinopril (PRINIVIL,ZESTRIL) 2.5 MG tablet, Take 2.5 mg by mouth Daily. FOR BLOOD PRESSURE, Disp: , Rfl:   •  metFORMIN ER (GLUCOPHAGE-XR) 500 MG 24 hr tablet, TAKE TWO TABLETS BY MOUTH TWO TIMES A DAY FOR BLOOD SUGAR, Disp: , Rfl:   •  metoprolol tartrate (LOPRESSOR) 25 MG tablet, 37.5 mg 2 (Two) Times a Day. Takes 1& 1/2 of 25mg tab per dose, Disp: , Rfl:   •  montelukast (SINGULAIR) 10 MG tablet, Take 1 tablet by mouth Every Night., Disp: 30 tablet, Rfl: 5  •  multivitamin with minerals tablet tablet, Take 1 tablet by mouth Daily., Disp: , Rfl:   •  nitroglycerin (NITROSTAT) 0.4 MG SL tablet, 1 under the tongue as needed for angina, may repeat q5mins for up three doses, Disp: 25 tablet, Rfl: 2  •  omeprazole (priLOSEC) 40 MG capsule, , Disp: , Rfl:   •  ondansetron (ZOFRAN) 4 MG tablet, Every 12 (Twelve) Hours., Disp: , Rfl:   •  promethazine (PHENERGAN) 25 MG tablet, Take 25 mg by mouth Every 6 (Six) Hours As Needed for Nausea or Vomiting., Disp: , Rfl:   •  Tezepelumab-ekko (Tezspire) 210 MG/1.91ML injection, Inject 1.91 mL under the skin into the appropriate area as directed Every 28 (Twenty-Eight) Days., Disp: 1.91 mL, Rfl: 5  •  Vanna Hogue SoloStar 300 UNIT/ML solution pen-injector injection, INJECT 54 UNITS UNDER THE  "SKIN IN THE MORNING AND 54 UNITS IN THE EVENING, Disp: , Rfl:   •  venlafaxine (EFFEXOR) 37.5 MG tablet, Take 1 tablet by mouth 2 (Two) Times a Day., Disp: , Rfl:   •  ascorbic acid (VITAMIN C) 1000 MG tablet, Take 1,000 mg by mouth Daily., Disp: , Rfl:   •  clopidogrel (PLAVIX) 75 MG tablet, Take 1 tablet by mouth Daily., Disp: 30 tablet, Rfl: 11  •  cyanocobalamin (VITAMIN B-12) 1000 MCG tablet, Take  by mouth., Disp: , Rfl:   •  DULoxetine (CYMBALTA) 30 MG capsule, Take 30 mg by mouth Daily., Disp: , Rfl:   •  isosorbide mononitrate (IMDUR) 30 MG 24 hr tablet, Take 1 tablet by mouth Daily., Disp: 30 tablet, Rfl: 11    The following portions of the patient's history were reviewed and updated as appropriate: allergies, current medications, past family history, past medical history, past social history, past surgical history and problem list.    Social History     Tobacco Use   • Smoking status: Former     Packs/day: 2.00     Years: 12.00     Pack years: 24.00     Types: Cigarettes     Quit date:      Years since quittin.8   • Smokeless tobacco: Never   Vaping Use   • Vaping Use: Never used   Substance Use Topics   • Alcohol use: Never   • Drug use: Never         Objective   Vitals:    11/15/22 0826   BP: 123/72   BP Location: Left arm   Patient Position: Sitting   Cuff Size: Large Adult   Pulse: 70   SpO2: 96%   Weight: 115 kg (253 lb 6.4 oz)   Height: 157.5 cm (62\")     Body mass index is 46.35 kg/m².    Constitutional:       General: Not in acute distress.     Appearance: Healthy appearance. Well-developed and not in distress. Not diaphoretic.   Eyes:      Conjunctiva/sclera: Conjunctivae normal.      Pupils: Pupils are equal, round, and reactive to light.   HENT:      Head: Normocephalic and atraumatic.   Neck:      Vascular: No carotid bruit or JVD.   Pulmonary:      Effort: Pulmonary effort is normal. No respiratory distress.      Breath sounds: Normal breath sounds.   Cardiovascular:      Normal " rate. Regular rhythm.   Skin:     General: Skin is cool.   Neurological:      Mental Status: Alert, oriented to person, place, and time and oriented to person, place and time.         Lab Results   Component Value Date     (L) 07/18/2022    K 3.9 11/10/2022    CL 93 (L) 07/18/2022    CO2 26.0 07/18/2022    BUN 10 07/18/2022    CREATININE 0.82 07/18/2022    GLUCOSE 93 07/18/2022    CALCIUM 8.9 07/18/2022    AST 20 07/18/2022    ALT 12 07/18/2022    ALKPHOS 64 07/18/2022     No results found for: CKTOTAL  Lab Results   Component Value Date    WBC 8.39 03/22/2022    HGB 11.9 (L) 03/22/2022    HCT 39.3 03/22/2022     (H) 03/22/2022     Lab Results   Component Value Date    INR 1.01 08/11/2022     No results found for: MG  No results found for: TSH, PSA, CHLPL, TRIG, HDL, LDL   No results found for: BNP    During this visit the following were done:  Labs Reviewed []    Labs Ordered []    Radiology Reports Reviewed []    Radiology Ordered []    PCP Records Reviewed []    Referring Provider Records Reviewed []    ER Records Reviewed []    Hospital Records Reviewed []    History Obtained From Family []    Radiology Images Reviewed []    Other Reviewed []    Records Requested []       Procedures    Assessment & Plan    Diagnosis Plan   1. Precordial pain        2. Primary hypertension                 Recommendations:  1. Precordial pain  a. Since symptoms did occur prior to her gastric stimulator I do think symptoms could possibly be related to gastroparesis.  I will start her on Plavix 75 mg daily given her recurrent chest pains and multiple risk factors.  She is unable to tolerate aspirin due to gastritis.  b. I will also start her on Imdur 30 mg daily.  c. She continues to have continued recurrent chest pains may have to consider coronary angiography.  d. Continue Lipitor, metoprolol, and lisinopril.    No follow-ups on file.    As always, I appreciate very much the opportunity to participate in the  cardiovascular care of your patients.      With Best Regards,    Jay Jones PA-C

## 2022-11-15 NOTE — PROGRESS NOTES
Nela Shaw MD  Josefina Cheng  1955  11/15/2022    Patient Active Problem List   Diagnosis   • Asthmatic bronchitis   • Former smoker   • Primary hypertension   • Chronic respiratory failure with hypoxia (HCC)       Dear Nela Shaw MD:    Subjective     History of Present Illness:    Chief Complaint   Patient presents with   • Med Management     Patient has medication list.    • Follow-up     3 mo f/u, post op.       Josefina Cheng is a pleasant 67 y.o. female with a past medical history significant for    Allergies   Allergen Reactions   • Adhesive Tape Rash   • Cephalexin Hives   • Cephalosporins Hives   • Ciprofloxacin Hcl Hives   • Penicillins Hives   :      Current Outpatient Medications:   •  albuterol sulfate  (90 Base) MCG/ACT inhaler, Inhale 2 puffs Every 4 (Four) Hours As Needed for Wheezing or Shortness of Air., Disp: 18 g, Rfl: 8  •  atorvastatin (LIPITOR) 20 MG tablet, Take 20 mg by mouth Daily., Disp: , Rfl:   •  budesonide-formoterol (Symbicort) 160-4.5 MCG/ACT inhaler, Inhale 2 puffs 2 (Two) Times a Day., Disp: 1 each, Rfl: 12  •  calcium citrate-vitamin d (CALCITRATE) 315-250 MG-UNIT tablet tablet, Take  by mouth Daily., Disp: , Rfl:   •  cholecalciferol (VITAMIN D3) 1.25 MG (14628 UT) capsule, Vitamin D3  Daily, Disp: , Rfl:   •  dapagliflozin Propanediol (Farxiga) 10 MG tablet, Take  by mouth Daily., Disp: , Rfl:   •  fenofibrate 160 MG tablet, Take 160 mg by mouth Daily., Disp: , Rfl:   •  fexofenadine (Allegra Allergy) 180 MG tablet, Take 1 tablet by mouth Daily., Disp: 30 tablet, Rfl: 5  •  insulin aspart (novoLOG) 100 UNIT/ML injection, Inject  under the skin 3 (Three) Times a Day Before Meals., Disp: , Rfl:   •  ipratropium-albuterol (DUO-NEB) 0.5-2.5 mg/3 ml nebulizer, Take 3 mL by nebulization 4 (Four) Times a Day As Needed for Wheezing or Shortness of Air., Disp: 360 mL, Rfl: 8  •  lisinopril (PRINIVIL,ZESTRIL) 2.5 MG tablet, Take 2.5 mg by mouth Daily. FOR BLOOD  PRESSURE, Disp: , Rfl:   •  metFORMIN ER (GLUCOPHAGE-XR) 500 MG 24 hr tablet, TAKE TWO TABLETS BY MOUTH TWO TIMES A DAY FOR BLOOD SUGAR, Disp: , Rfl:   •  metoprolol tartrate (LOPRESSOR) 25 MG tablet, 37.5 mg 2 (Two) Times a Day. Takes 1& 1/2 of 25mg tab per dose, Disp: , Rfl:   •  montelukast (SINGULAIR) 10 MG tablet, Take 1 tablet by mouth Every Night., Disp: 30 tablet, Rfl: 5  •  multivitamin with minerals tablet tablet, Take 1 tablet by mouth Daily., Disp: , Rfl:   •  nitroglycerin (NITROSTAT) 0.4 MG SL tablet, 1 under the tongue as needed for angina, may repeat q5mins for up three doses, Disp: 25 tablet, Rfl: 2  •  omeprazole (priLOSEC) 40 MG capsule, , Disp: , Rfl:   •  ondansetron (ZOFRAN) 4 MG tablet, Every 12 (Twelve) Hours., Disp: , Rfl:   •  promethazine (PHENERGAN) 25 MG tablet, Take 25 mg by mouth Every 6 (Six) Hours As Needed for Nausea or Vomiting., Disp: , Rfl:   •  Tezepelumab-ekko (Tezspire) 210 MG/1.91ML injection, Inject 1.91 mL under the skin into the appropriate area as directed Every 28 (Twenty-Eight) Days., Disp: 1.91 mL, Rfl: 5  •  Toujeo Max SoloStar 300 UNIT/ML solution pen-injector injection, INJECT 54 UNITS UNDER THE SKIN IN THE MORNING AND 54 UNITS IN THE EVENING, Disp: , Rfl:   •  venlafaxine (EFFEXOR) 37.5 MG tablet, Take 1 tablet by mouth 2 (Two) Times a Day., Disp: , Rfl:   •  ascorbic acid (VITAMIN C) 1000 MG tablet, Take 1,000 mg by mouth Daily., Disp: , Rfl:   •  cyanocobalamin (VITAMIN B-12) 1000 MCG tablet, Take  by mouth., Disp: , Rfl:   •  DULoxetine (CYMBALTA) 30 MG capsule, Take 30 mg by mouth Daily., Disp: , Rfl:     The following portions of the patient's history were reviewed and updated as appropriate: allergies, current medications, past family history, past medical history, past social history, past surgical history and problem list.    Social History     Tobacco Use   • Smoking status: Former     Packs/day: 2.00     Years: 12.00     Pack years: 24.00     Types:  "Cigarettes     Quit date:      Years since quittin.8   • Smokeless tobacco: Never   Vaping Use   • Vaping Use: Never used   Substance Use Topics   • Alcohol use: Never   • Drug use: Never         Objective   Vitals:    11/15/22 0826   BP: 123/72   BP Location: Left arm   Patient Position: Sitting   Cuff Size: Large Adult   Pulse: 70   SpO2: 96%   Weight: 115 kg (253 lb 6.4 oz)   Height: 157.5 cm (62\")     Body mass index is 46.35 kg/m².    Physical Exam    Lab Results   Component Value Date     (L) 2022    K 3.9 11/10/2022    CL 93 (L) 2022    CO2 26.0 2022    BUN 10 2022    CREATININE 0.82 2022    GLUCOSE 93 2022    CALCIUM 8.9 2022    AST 20 2022    ALT 12 2022    ALKPHOS 64 2022     No results found for: CKTOTAL  Lab Results   Component Value Date    WBC 8.39 2022    HGB 11.9 (L) 2022    HCT 39.3 2022     (H) 2022     Lab Results   Component Value Date    INR 1.01 2022     No results found for: MG  No results found for: TSH, PSA, CHLPL, TRIG, HDL, LDL   No results found for: BNP    During this visit the following were done:  Labs Reviewed []    Labs Ordered []    Radiology Reports Reviewed []    Radiology Ordered []    PCP Records Reviewed []    Referring Provider Records Reviewed []    ER Records Reviewed []    Hospital Records Reviewed []    History Obtained From Family []    Radiology Images Reviewed []    Other Reviewed []    Records Requested []       Procedures    Assessment & Plan   No diagnosis found.         Recommendations:  1.     No follow-ups on file.    As always, I appreciate very much the opportunity to participate in the cardiovascular care of your patients.      With Best Regards,    Jay Jones PA-C          "

## 2022-12-07 ENCOUNTER — OFFICE VISIT (OUTPATIENT)
Dept: PULMONOLOGY | Facility: CLINIC | Age: 67
End: 2022-12-07

## 2022-12-07 VITALS
HEIGHT: 62 IN | WEIGHT: 251.4 LBS | DIASTOLIC BLOOD PRESSURE: 72 MMHG | OXYGEN SATURATION: 96 % | HEART RATE: 64 BPM | TEMPERATURE: 98.6 F | SYSTOLIC BLOOD PRESSURE: 128 MMHG | BODY MASS INDEX: 46.26 KG/M2

## 2022-12-07 DIAGNOSIS — J45.50 SEVERE PERSISTENT ASTHMATIC BRONCHITIS WITHOUT COMPLICATION: Primary | ICD-10-CM

## 2022-12-07 DIAGNOSIS — J96.11 CHRONIC RESPIRATORY FAILURE WITH HYPOXIA: ICD-10-CM

## 2022-12-07 DIAGNOSIS — Z87.891 FORMER SMOKER: ICD-10-CM

## 2022-12-07 PROCEDURE — 99214 OFFICE O/P EST MOD 30 MIN: CPT | Performed by: PHYSICIAN ASSISTANT

## 2022-12-07 RX ORDER — HYDROCODONE BITARTRATE AND ACETAMINOPHEN 5; 325 MG/1; MG/1
1 TABLET ORAL EVERY 4 HOURS PRN
COMMUNITY
Start: 2022-11-10

## 2022-12-07 RX ORDER — TIOTROPIUM BROMIDE INHALATION SPRAY 1.56 UG/1
2 SPRAY, METERED RESPIRATORY (INHALATION)
Qty: 4 G | Refills: 8 | Status: SHIPPED | OUTPATIENT
Start: 2022-12-07

## 2022-12-07 NOTE — PROGRESS NOTES
"Chief Complaint  Severe persistent asthmatic bronchitis without complication (Tezspire was ordered on 11/28 but has not been received from [facility])    Subjective        Josefina Cheng presents to [facility] PULMONARY & CRITICAL CARE MEDICINE  History of Present Illness    Patient presents today for follow-up.  Has noticed respiratory improvement after starting the test prior injections, but notes symptom worsening near the time for the next injection due.  She was scheduled to receive the next injection today, but has not received it from mail order pharmacy.  Otherwise, she feels that symptoms are fairly stable at this time. She continues on Symbicort, but was not able to receive the the Spiriva refills.   She still has supplemental oxygen supplies to use on an as needed basis. Tolerating room air today.       Objective   Vital Signs:  /72 (BP Location: Left arm, Patient Position: Sitting)   Pulse 64   Temp 98.6 °F (37 °C)   Ht 157.5 cm (62\")   Wt 114 kg (251 lb 6.4 oz)   SpO2 96%   BMI 45.98 kg/m²   Estimated body mass index is 45.98 kg/m² as calculated from the following:    Height as of this encounter: 157.5 cm (62\").    Weight as of this encounter: 114 kg (251 lb 6.4 oz).      Physical Exam  Vitals reviewed.   Constitutional:       General: She is not in acute distress.     Appearance: She is well-developed. She is not diaphoretic.   HENT:      Head: Normocephalic and atraumatic.   Cardiovascular:      Rate and Rhythm: Normal rate and regular rhythm.      Heart sounds: Normal heart sounds, S1 normal and S2 normal.   Pulmonary:      Effort: Pulmonary effort is normal.      Breath sounds: No wheezing, rhonchi or rales.   Neurological:      Mental Status: She is alert and oriented to person, place, and time.   Psychiatric:         Behavior: Behavior normal.        Result Review :  The following data was reviewed by: Diane Vale PA-C on 12/07/2022:    Reviewed chest xray from July 2022. "   Reviewed CT chest from April 2022.   Reviewed PFT.       Assessment and Plan   Diagnoses and all orders for this visit:    1. Severe persistent asthmatic bronchitis without complication (Primary)  -     Tiotropium Bromide Monohydrate (Spiriva Respimat) 1.25 MCG/ACT aerosol solution inhaler; Inhale 2 puffs Daily.  Dispense: 4 g; Refill: 8    2. Chronic respiratory failure with hypoxia (HCC)    3. Former smoker        Moderate-severe asthma:  • Continue albuterol inhaler as needed  • Continue duonebs as needed  • Continue Symbicort scheduled  • Restarting Spiriva respimat - 1.25 mcg 2 puffs daily   • Continuing Tezspire monthly injections (scheduled to received dose today, but not receive in the mail yet. Noticing significant benefit from the first 2 doses).      Contacted [facility] specialty number - [phone] - they will ship the injection, and it should arrive at our office by 12/14/22        Chronic hypoxic respiratory failure:  Compliant with use of 2 L/min as needed.          Former smoker:  • CT chest from April 2022 showed no nodules, minimal scarring appearance.   Will plan for repeat low dose CT chest in 1 year (April 2023).         Follow Up   Return in about 5 months (around 5/7/2023), or if symptoms worsen or fail to improve, for Next scheduled follow up.  Patient was given instructions and counseling regarding her condition or for health maintenance advice. Please see specific information pulled into the AVS if appropriate.

## 2022-12-15 ENCOUNTER — OFFICE VISIT (OUTPATIENT)
Dept: CARDIOLOGY | Facility: CLINIC | Age: 67
End: 2022-12-15

## 2022-12-15 VITALS
SYSTOLIC BLOOD PRESSURE: 163 MMHG | DIASTOLIC BLOOD PRESSURE: 73 MMHG | OXYGEN SATURATION: 98 % | HEART RATE: 84 BPM | HEIGHT: 62 IN | BODY MASS INDEX: 45.45 KG/M2 | WEIGHT: 247 LBS

## 2022-12-15 DIAGNOSIS — I10 PRIMARY HYPERTENSION: Primary | ICD-10-CM

## 2022-12-15 PROCEDURE — 99214 OFFICE O/P EST MOD 30 MIN: CPT | Performed by: PHYSICIAN ASSISTANT

## 2022-12-15 RX ORDER — ISOSORBIDE MONONITRATE 60 MG/1
60 TABLET, EXTENDED RELEASE ORAL DAILY
Qty: 90 TABLET | Refills: 3 | Status: SHIPPED | OUTPATIENT
Start: 2022-12-15

## 2022-12-15 NOTE — PROGRESS NOTES
Nela Shaw MD  Josefina Cheng  1955  12/15/2022    Patient Active Problem List   Diagnosis   • Asthmatic bronchitis   • Former smoker   • Primary hypertension   • Chronic respiratory failure with hypoxia (HCC)       Dear Nela Shaw MD:    Subjective     History of Present Illness:    Chief Complaint   Patient presents with   • Hypertension     Follow up        Josefina Cheng is a pleasant 67 y.o. female with a past medical history significant for no known coronary artery disease.  However she does have longstanding diabetes.  She also has history of tobacco abuse but did quit in 2010.  She also has essential hypertension and dyslipidemia.  She comes in today for cardiology follow-up.    She reports that she continues to have chest pains typically at least once weekly at times more often.  She does report that isosorbide has helped but did not resolve the pain.  She reports she did have 1 episode that was severe that resolved after 1 dose of sublingual nitroglycerin.  She reported that this pain did last for about 15 minutes.  She reports that when the pain presented she was eating.  She denies worsening dyspnea or diaphoresis.  She describes this pain as a tightness in the center of her chest.                Allergies   Allergen Reactions   • Adhesive Tape Rash   • Cephalexin Hives   • Cephalosporins Hives   • Ciprofloxacin Hcl Hives   • Penicillins Hives   :      Current Outpatient Medications:   •  albuterol sulfate  (90 Base) MCG/ACT inhaler, Inhale 2 puffs Every 4 (Four) Hours As Needed for Wheezing or Shortness of Air., Disp: 18 g, Rfl: 8  •  ascorbic acid (VITAMIN C) 1000 MG tablet, Take 1,000 mg by mouth Daily., Disp: , Rfl:   •  atorvastatin (LIPITOR) 20 MG tablet, Take 20 mg by mouth Daily., Disp: , Rfl:   •  budesonide-formoterol (Symbicort) 160-4.5 MCG/ACT inhaler, Inhale 2 puffs 2 (Two) Times a Day., Disp: 1 each, Rfl: 12  •  calcium citrate-vitamin d (CALCITRATE) 315-250  MG-UNIT tablet tablet, Take  by mouth Daily., Disp: , Rfl:   •  cholecalciferol (VITAMIN D3) 1.25 MG (36474 UT) capsule, Vitamin D3  Daily, Disp: , Rfl:   •  clopidogrel (PLAVIX) 75 MG tablet, Take 1 tablet by mouth Daily., Disp: 30 tablet, Rfl: 11  •  cyanocobalamin (VITAMIN B-12) 1000 MCG tablet, Take  by mouth., Disp: , Rfl:   •  dapagliflozin Propanediol (Farxiga) 10 MG tablet, Take  by mouth Daily., Disp: , Rfl:   •  fenofibrate 160 MG tablet, Take 160 mg by mouth Daily., Disp: , Rfl:   •  fexofenadine (Allegra Allergy) 180 MG tablet, Take 1 tablet by mouth Daily., Disp: 30 tablet, Rfl: 5  •  HYDROcodone-acetaminophen (NORCO) 5-325 MG per tablet, Take 1 tablet by mouth Every 4 (Four) Hours As Needed. for pain, Disp: , Rfl:   •  insulin aspart (novoLOG) 100 UNIT/ML injection, Inject  under the skin 3 (Three) Times a Day Before Meals., Disp: , Rfl:   •  ipratropium-albuterol (DUO-NEB) 0.5-2.5 mg/3 ml nebulizer, Take 3 mL by nebulization 4 (Four) Times a Day As Needed for Wheezing or Shortness of Air., Disp: 360 mL, Rfl: 8  •  isosorbide mononitrate (IMDUR) 60 MG 24 hr tablet, Take 1 tablet by mouth Daily., Disp: 90 tablet, Rfl: 3  •  lisinopril (PRINIVIL,ZESTRIL) 2.5 MG tablet, Take 2.5 mg by mouth Daily. FOR BLOOD PRESSURE, Disp: , Rfl:   •  metFORMIN ER (GLUCOPHAGE-XR) 500 MG 24 hr tablet, TAKE TWO TABLETS BY MOUTH TWO TIMES A DAY FOR BLOOD SUGAR, Disp: , Rfl:   •  metoprolol tartrate (LOPRESSOR) 25 MG tablet, 37.5 mg 2 (Two) Times a Day. Takes 1& 1/2 of 25mg tab per dose, Disp: , Rfl:   •  montelukast (SINGULAIR) 10 MG tablet, Take 1 tablet by mouth Every Night., Disp: 30 tablet, Rfl: 5  •  multivitamin with minerals tablet tablet, Take 1 tablet by mouth Daily., Disp: , Rfl:   •  nitroglycerin (NITROSTAT) 0.4 MG SL tablet, 1 under the tongue as needed for angina, may repeat q5mins for up three doses, Disp: 25 tablet, Rfl: 2  •  omeprazole (priLOSEC) 40 MG capsule, , Disp: , Rfl:   •  ondansetron (ZOFRAN) 4 MG  "tablet, Every 12 (Twelve) Hours., Disp: , Rfl:   •  promethazine (PHENERGAN) 25 MG tablet, Take 25 mg by mouth Every 6 (Six) Hours As Needed for Nausea or Vomiting., Disp: , Rfl:   •  Tezepelumab-ekko (Tezspire) 210 MG/1.91ML injection, Inject 1.91 mL under the skin into the appropriate area as directed Every 28 (Twenty-Eight) Days., Disp: 1.91 mL, Rfl: 5  •  Tiotropium Bromide Monohydrate (Spiriva Respimat) 1.25 MCG/ACT aerosol solution inhaler, Inhale 2 puffs Daily., Disp: 4 g, Rfl: 8  •  Toujeo Max SoloStar 300 UNIT/ML solution pen-injector injection, INJECT 54 UNITS UNDER THE SKIN IN THE MORNING AND 54 UNITS IN THE EVENING, Disp: , Rfl:   •  venlafaxine (EFFEXOR) 75 MG tablet, Take 75 mg by mouth 2 (Two) Times a Day., Disp: , Rfl:     The following portions of the patient's history were reviewed and updated as appropriate: allergies, current medications, past family history, past medical history, past social history, past surgical history and problem list.    Social History     Tobacco Use   • Smoking status: Former     Packs/day: 2.00     Years: 28.00     Pack years: 56.00     Types: Cigarettes     Quit date:      Years since quittin.9   • Smokeless tobacco: Never   Vaping Use   • Vaping Use: Never used   Substance Use Topics   • Alcohol use: Never   • Drug use: Never         Objective   Vitals:    12/15/22 0917   BP: 163/73   BP Location: Right arm   Patient Position: Sitting   Cuff Size: Large Adult   Pulse: 84   SpO2: 98%   Weight: 112 kg (247 lb)   Height: 157.5 cm (62\")     Body mass index is 45.18 kg/m².    Constitutional:       General: Not in acute distress.     Appearance: Healthy appearance. Well-developed and not in distress. Not diaphoretic.   Eyes:      Conjunctiva/sclera: Conjunctivae normal.      Pupils: Pupils are equal, round, and reactive to light.   HENT:      Head: Normocephalic and atraumatic.   Neck:      Vascular: No carotid bruit or JVD.   Pulmonary:      Effort: Pulmonary " effort is normal. No respiratory distress.      Breath sounds: Normal breath sounds.   Cardiovascular:      Normal rate. Regular rhythm.   Skin:     General: Skin is cool.   Neurological:      Mental Status: Alert, oriented to person, place, and time and oriented to person, place and time.         Lab Results   Component Value Date     (L) 07/18/2022    K 3.9 11/10/2022    CL 93 (L) 07/18/2022    CO2 26.0 07/18/2022    BUN 10 07/18/2022    CREATININE 0.82 07/18/2022    GLUCOSE 93 07/18/2022    CALCIUM 8.9 07/18/2022    AST 20 07/18/2022    ALT 12 07/18/2022    ALKPHOS 64 07/18/2022     No results found for: CKTOTAL  Lab Results   Component Value Date    WBC 8.39 03/22/2022    HGB 11.9 (L) 03/22/2022    HCT 39.3 03/22/2022     (H) 03/22/2022     Lab Results   Component Value Date    INR 1.01 08/11/2022     No results found for: MG  No results found for: TSH, PSA, CHLPL, TRIG, HDL, LDL   No results found for: BNP    During this visit the following were done:  Labs Reviewed []    Labs Ordered []    Radiology Reports Reviewed []    Radiology Ordered []    PCP Records Reviewed []    Referring Provider Records Reviewed []    ER Records Reviewed []    Hospital Records Reviewed []    History Obtained From Family []    Radiology Images Reviewed []    Other Reviewed []    Records Requested []       Procedures    Assessment & Plan    Diagnosis Plan   1. Primary hypertension                 Recommendations:  1. Precordial pain  a. Given patient's normal stress test and echocardiogram in September 2021 I will try to continue with GDMT for now.    b. Although her symptoms do have some typical features they also have atypical features more consistent with GI etiology.  I do encourage pursuing noncardiac work-up.    c. I will increase Imdur to 60 mg  d. Continue Plavix, atorvastatin, lisinopril, and Lopressor.    No follow-ups on file.    As always, I appreciate very much the opportunity to participate in the  cardiovascular care of your patients.      With Best Regards,    Jay Jones PA-C

## 2022-12-16 ENCOUNTER — TELEPHONE (OUTPATIENT)
Dept: CARDIOLOGY | Facility: CLINIC | Age: 67
End: 2022-12-16

## 2022-12-16 NOTE — TELEPHONE ENCOUNTER
----- Message from Jay Jones PA-C sent at 12/15/2022  2:35 PM EST -----  Please call and inform patient after discussing with Dr. Araujo opted to try to pursue further medical therapy especially considering addition of Imdur did improve symptoms.  May consider coronary angiography versus stress test at next visit.    Patient aware

## 2022-12-30 ENCOUNTER — CLINICAL SUPPORT (OUTPATIENT)
Dept: PULMONOLOGY | Facility: CLINIC | Age: 67
End: 2022-12-30

## 2022-12-30 DIAGNOSIS — J45.50 SEVERE PERSISTENT ASTHMA WITHOUT COMPLICATION: Primary | ICD-10-CM

## 2022-12-30 PROCEDURE — 96372 THER/PROPH/DIAG INJ SC/IM: CPT | Performed by: PHYSICIAN ASSISTANT

## 2023-01-17 ENCOUNTER — OFFICE VISIT (OUTPATIENT)
Dept: CARDIOLOGY | Facility: CLINIC | Age: 68
End: 2023-01-17
Payer: COMMERCIAL

## 2023-01-17 VITALS
HEIGHT: 62 IN | SYSTOLIC BLOOD PRESSURE: 152 MMHG | OXYGEN SATURATION: 91 % | HEART RATE: 83 BPM | BODY MASS INDEX: 45.82 KG/M2 | WEIGHT: 249 LBS | DIASTOLIC BLOOD PRESSURE: 80 MMHG

## 2023-01-17 DIAGNOSIS — R00.2 PALPITATIONS: Primary | ICD-10-CM

## 2023-01-17 DIAGNOSIS — I10 ESSENTIAL HYPERTENSION: ICD-10-CM

## 2023-01-17 DIAGNOSIS — R06.09 DYSPNEA ON EXERTION: ICD-10-CM

## 2023-01-17 DIAGNOSIS — R07.2 PRECORDIAL PAIN: ICD-10-CM

## 2023-01-17 PROCEDURE — 99214 OFFICE O/P EST MOD 30 MIN: CPT | Performed by: NURSE PRACTITIONER

## 2023-01-17 RX ORDER — METOPROLOL TARTRATE 50 MG/1
50 TABLET, FILM COATED ORAL 2 TIMES DAILY
Qty: 60 TABLET | Refills: 5 | Status: SHIPPED | OUTPATIENT
Start: 2023-01-17 | End: 2023-03-10 | Stop reason: SDUPTHER

## 2023-01-17 RX ORDER — GUAIFENESIN 600 MG/1
1200 TABLET, EXTENDED RELEASE ORAL 2 TIMES DAILY
COMMUNITY

## 2023-01-17 NOTE — LETTER
January 17, 2023     Nela Shaw MD  [address] [facility] [address]  [facility] [address]    Patient: Josefina Cheng   YOB: 1955   Date of Visit: 1/17/2023       Dear Dr. Colin MD:    Thank you for referring Josefina Cheng to me for evaluation. Below are the relevant portions of my assessment and plan of care.    If you have questions, please do not hesitate to call me. I look forward to following Josefina along with you.         Sincerely,        TIMA Ramirez        CC: No Karla May APRN  01/17/23 0952  Signed  Nela Shaw MD  Josefina Cheng  1955 01/17/2023    Patient Active Problem List   Diagnosis   • Asthmatic bronchitis   • Former smoker   • Primary hypertension   • Chronic respiratory failure with hypoxia (HCC)       Dear Nela Shaw MD:    Subjective      Chief Complaint   Patient presents with   • Med Management     List.    • Palpitations   • Shortness of Breath   • Edema           History of Present Illness:    Josefina Cheng is a 67 y.o. female with a past medical history of diabetes mellitus type 2, essential hypertension, dyslipidemia, and tobacco abuse.  She presents today for cardiology follow-up.  Previously, she was having some chest discomfort and scapular pain.  However, ischemic evaluation was deferred at the time due to recent stress test revealing no ischemia (September 2021).  Isosorbide was increased to 60 mg daily.  Since that time, her symptoms have improved.  She has an occasional chest pain which radiates to her left scapula.  However, she notices this always occurs when she is having palpitations which feel like a rapid heartbeat.  Denies any near-syncope or syncope.  Reports this has been intermittent in the past.  She has worn a monitor in the past ordered by her prior cardiologist but states this did not reveal any issues.  She believes she did have a stress test last year as well at [facility].          Allergies    Allergen Reactions   • Adhesive Tape Rash   • Cephalexin Hives   • Cephalosporins Hives   • Ciprofloxacin Hcl Hives   • Penicillins Hives   :      Current Outpatient Medications:   •  albuterol sulfate  (90 Base) MCG/ACT inhaler, Inhale 2 puffs Every 4 (Four) Hours As Needed for Wheezing or Shortness of Air., Disp: 18 g, Rfl: 8  •  ascorbic acid (VITAMIN C) 1000 MG tablet, Take 1,000 mg by mouth Daily., Disp: , Rfl:   •  atorvastatin (LIPITOR) 20 MG tablet, Take 20 mg by mouth Daily., Disp: , Rfl:   •  budesonide-formoterol (Symbicort) 160-4.5 MCG/ACT inhaler, Inhale 2 puffs 2 (Two) Times a Day., Disp: 1 each, Rfl: 12  •  calcium citrate-vitamin d (CALCITRATE) 315-250 MG-UNIT tablet tablet, Take  by mouth Daily., Disp: , Rfl:   •  cholecalciferol (VITAMIN D3) 1.25 MG (78887 UT) capsule, Vitamin D3  Daily, Disp: , Rfl:   •  clopidogrel (PLAVIX) 75 MG tablet, Take 1 tablet by mouth Daily., Disp: 30 tablet, Rfl: 11  •  dapagliflozin Propanediol (Farxiga) 10 MG tablet, Take  by mouth Daily., Disp: , Rfl:   •  fenofibrate 160 MG tablet, Take 160 mg by mouth Daily., Disp: , Rfl:   •  fexofenadine (Allegra Allergy) 180 MG tablet, Take 1 tablet by mouth Daily., Disp: 30 tablet, Rfl: 5  •  guaiFENesin (MUCINEX) 600 MG 12 hr tablet, Take 1,200 mg by mouth 2 (Two) Times a Day., Disp: , Rfl:   •  HYDROcodone-acetaminophen (NORCO) 5-325 MG per tablet, Take 1 tablet by mouth Every 4 (Four) Hours As Needed. for pain, Disp: , Rfl:   •  insulin aspart (novoLOG) 100 UNIT/ML injection, Inject  under the skin 3 (Three) Times a Day Before Meals., Disp: , Rfl:   •  ipratropium-albuterol (DUO-NEB) 0.5-2.5 mg/3 ml nebulizer, Take 3 mL by nebulization 4 (Four) Times a Day As Needed for Wheezing or Shortness of Air., Disp: 360 mL, Rfl: 8  •  isosorbide mononitrate (IMDUR) 60 MG 24 hr tablet, Take 1 tablet by mouth Daily., Disp: 90 tablet, Rfl: 3  •  lisinopril (PRINIVIL,ZESTRIL) 2.5 MG tablet, Take 2.5 mg by mouth Daily. FOR BLOOD  PRESSURE, Disp: , Rfl:   •  metFORMIN ER (GLUCOPHAGE-XR) 500 MG 24 hr tablet, TAKE TWO TABLETS BY MOUTH TWO TIMES A DAY FOR BLOOD SUGAR, Disp: , Rfl:   •  metoprolol tartrate (LOPRESSOR) 50 MG tablet, Take 1 tablet by mouth 2 (Two) Times a Day., Disp: 60 tablet, Rfl: 5  •  Multiple Vitamins-Minerals (ZINC PO), Take  by mouth., Disp: , Rfl:   •  nitroglycerin (NITROSTAT) 0.4 MG SL tablet, 1 under the tongue as needed for angina, may repeat q5mins for up three doses, Disp: 25 tablet, Rfl: 2  •  omeprazole (priLOSEC) 40 MG capsule, , Disp: , Rfl:   •  ondansetron (ZOFRAN) 4 MG tablet, Every 12 (Twelve) Hours., Disp: , Rfl:   •  promethazine (PHENERGAN) 25 MG tablet, Take 25 mg by mouth Every 6 (Six) Hours As Needed for Nausea or Vomiting., Disp: , Rfl:   •  Tezepelumab-ekko (Tezspire) 210 MG/1.91ML injection, Inject 1.91 mL under the skin into the appropriate area as directed Every 28 (Twenty-Eight) Days., Disp: 1.91 mL, Rfl: 5  •  Tiotropium Bromide Monohydrate (Spiriva Respimat) 1.25 MCG/ACT aerosol solution inhaler, Inhale 2 puffs Daily., Disp: 4 g, Rfl: 8  •  Toujeo Max SoloStar 300 UNIT/ML solution pen-injector injection, INJECT 54 UNITS UNDER THE SKIN IN THE MORNING AND 54 UNITS IN THE EVENING, Disp: , Rfl:   •  venlafaxine (EFFEXOR) 75 MG tablet, Take 75 mg by mouth 2 (Two) Times a Day., Disp: , Rfl:       The following portions of the patient's history were reviewed and updated as appropriate: allergies, current medications, past family history, past medical history, past social history, past surgical history and problem list.    Social History     Tobacco Use   • Smoking status: Former     Packs/day: 2.00     Years: 28.00     Pack years: 56.00     Types: Cigarettes     Quit date:      Years since quittin.0   • Smokeless tobacco: Never   Vaping Use   • Vaping Use: Never used   Substance Use Topics   • Alcohol use: Never   • Drug use: Never       ROS    Objective    Vitals:    23 0901   BP: 152/80  "  BP Location: Left arm   Patient Position: Sitting   Cuff Size: Adult   Pulse: 83   SpO2: 91%   Weight: 113 kg (249 lb)   Height: 157.5 cm (62\")     Body mass index is 45.54 kg/m².    Stress Test 3/28/22      Vitals reviewed.   Constitutional:       Appearance: Healthy appearance. Well-developed and not in distress.   HENT:      Head: Normocephalic and atraumatic.   Neck:      Vascular: No JVD.   Pulmonary:      Effort: Pulmonary effort is normal.      Breath sounds: Normal breath sounds. No wheezing. No rales.   Cardiovascular:      Normal rate. Regular rhythm.      Murmurs: There is no murmur.      . No S3 and S4 gallop.   Edema:     Peripheral edema absent.   Abdominal:      General: Bowel sounds are normal.      Palpations: Abdomen is soft.   Skin:     General: Skin is warm and dry.   Neurological:      Mental Status: Alert, oriented to person, place, and time and oriented to person, place and time.   Psychiatric:         Mood and Affect: Mood normal.         Behavior: Behavior normal.         Lab Results   Component Value Date     (L) 07/18/2022    K 3.9 11/10/2022    CL 93 (L) 07/18/2022    CO2 26.0 07/18/2022    BUN 10 07/18/2022    CREATININE 0.82 07/18/2022    GLUCOSE 93 07/18/2022    CALCIUM 8.9 07/18/2022    AST 20 07/18/2022    ALT 12 07/18/2022    ALKPHOS 64 07/18/2022     No results found for: CKTOTAL  Lab Results   Component Value Date    WBC 8.39 03/22/2022    HGB 11.9 (L) 03/22/2022    HCT 39.3 03/22/2022     (H) 03/22/2022     Lab Results   Component Value Date    INR 1.01 08/11/2022     No results found for: MG  No results found for: TSH, PSA, CHLPL, TRIG, HDL, LDL   No results found for: BNP        Procedures      Assessment & Plan    Diagnosis Plan   1. Palpitations  metoprolol tartrate (LOPRESSOR) 50 MG tablet    Cardiac Event Monitor      2. Essential hypertension        3. Precordial pain        4. Dyspnea on exertion                    Recommendations:    1. Since she did have " a stress test in March 2022 which revealed no evidence of myocardial ischemia.  We will continue with Plavix, atorvastatin, lisinopril, metoprolol, and isosorbide.  2. She does have palpitations which occur with the chest discomfort.  We will order a 30-day monitor to rule out any cardiac arrhythmias which could be causing her symptoms.  3. Will increase metoprolol to tartrate to 50 mg twice daily  4. Follow-up in 6 to 7 weeks or sooner if needed.        Return in about 7 weeks (around 3/7/2023) for Recheck.    As always, I appreciate very much the opportunity to participate in the cardiovascular care of your patients.      With Best Regards,    TIMA Ramirez

## 2023-01-17 NOTE — PROGRESS NOTES
Nela Shaw MD  Josefina Cheng  1955 01/17/2023    Patient Active Problem List   Diagnosis   • Asthmatic bronchitis   • Former smoker   • Primary hypertension   • Chronic respiratory failure with hypoxia (HCC)       Dear Nela Shaw MD:    Subjective     Chief Complaint   Patient presents with   • Med Management     List.    • Palpitations   • Shortness of Breath   • Edema           History of Present Illness:    Josefina Cheng is a 67 y.o. female with a past medical history of diabetes mellitus type 2, essential hypertension, dyslipidemia, and tobacco abuse.  She presents today for cardiology follow-up.  Previously, she was having some chest discomfort and scapular pain.  However, ischemic evaluation was deferred at the time due to recent stress test revealing no ischemia (September 2021).  Isosorbide was increased to 60 mg daily.  Since that time, her symptoms have improved.  She has an occasional chest pain which radiates to her left scapula.  However, she notices this always occurs when she is having palpitations which feel like a rapid heartbeat.  Denies any near-syncope or syncope.  Reports this has been intermittent in the past.  She has worn a monitor in the past ordered by her prior cardiologist but states this did not reveal any issues.  She believes she did have a stress test last year as well at [facility] [place].          Allergies   Allergen Reactions   • Adhesive Tape Rash   • Cephalexin Hives   • Cephalosporins Hives   • Ciprofloxacin Hcl Hives   • Penicillins Hives   :      Current Outpatient Medications:   •  albuterol sulfate  (90 Base) MCG/ACT inhaler, Inhale 2 puffs Every 4 (Four) Hours As Needed for Wheezing or Shortness of Air., Disp: 18 g, Rfl: 8  •  ascorbic acid (VITAMIN C) 1000 MG tablet, Take 1,000 mg by mouth Daily., Disp: , Rfl:   •  atorvastatin (LIPITOR) 20 MG tablet, Take 20 mg by mouth Daily., Disp: , Rfl:   •  budesonide-formoterol (Symbicort) 160-4.5  MCG/ACT inhaler, Inhale 2 puffs 2 (Two) Times a Day., Disp: 1 each, Rfl: 12  •  calcium citrate-vitamin d (CALCITRATE) 315-250 MG-UNIT tablet tablet, Take  by mouth Daily., Disp: , Rfl:   •  cholecalciferol (VITAMIN D3) 1.25 MG (18461 UT) capsule, Vitamin D3  Daily, Disp: , Rfl:   •  clopidogrel (PLAVIX) 75 MG tablet, Take 1 tablet by mouth Daily., Disp: 30 tablet, Rfl: 11  •  dapagliflozin Propanediol (Farxiga) 10 MG tablet, Take  by mouth Daily., Disp: , Rfl:   •  fenofibrate 160 MG tablet, Take 160 mg by mouth Daily., Disp: , Rfl:   •  fexofenadine (Allegra Allergy) 180 MG tablet, Take 1 tablet by mouth Daily., Disp: 30 tablet, Rfl: 5  •  guaiFENesin (MUCINEX) 600 MG 12 hr tablet, Take 1,200 mg by mouth 2 (Two) Times a Day., Disp: , Rfl:   •  HYDROcodone-acetaminophen (NORCO) 5-325 MG per tablet, Take 1 tablet by mouth Every 4 (Four) Hours As Needed. for pain, Disp: , Rfl:   •  insulin aspart (novoLOG) 100 UNIT/ML injection, Inject  under the skin 3 (Three) Times a Day Before Meals., Disp: , Rfl:   •  ipratropium-albuterol (DUO-NEB) 0.5-2.5 mg/3 ml nebulizer, Take 3 mL by nebulization 4 (Four) Times a Day As Needed for Wheezing or Shortness of Air., Disp: 360 mL, Rfl: 8  •  isosorbide mononitrate (IMDUR) 60 MG 24 hr tablet, Take 1 tablet by mouth Daily., Disp: 90 tablet, Rfl: 3  •  lisinopril (PRINIVIL,ZESTRIL) 2.5 MG tablet, Take 2.5 mg by mouth Daily. FOR BLOOD PRESSURE, Disp: , Rfl:   •  metFORMIN ER (GLUCOPHAGE-XR) 500 MG 24 hr tablet, TAKE TWO TABLETS BY MOUTH TWO TIMES A DAY FOR BLOOD SUGAR, Disp: , Rfl:   •  metoprolol tartrate (LOPRESSOR) 50 MG tablet, Take 1 tablet by mouth 2 (Two) Times a Day., Disp: 60 tablet, Rfl: 5  •  Multiple Vitamins-Minerals (ZINC PO), Take  by mouth., Disp: , Rfl:   •  nitroglycerin (NITROSTAT) 0.4 MG SL tablet, 1 under the tongue as needed for angina, may repeat q5mins for up three doses, Disp: 25 tablet, Rfl: 2  •  omeprazole (priLOSEC) 40 MG capsule, , Disp: , Rfl:   •   "ondansetron (ZOFRAN) 4 MG tablet, Every 12 (Twelve) Hours., Disp: , Rfl:   •  promethazine (PHENERGAN) 25 MG tablet, Take 25 mg by mouth Every 6 (Six) Hours As Needed for Nausea or Vomiting., Disp: , Rfl:   •  Tezepelumab-ekko (Tezspire) 210 MG/1.91ML injection, Inject 1.91 mL under the skin into the appropriate area as directed Every 28 (Twenty-Eight) Days., Disp: 1.91 mL, Rfl: 5  •  Tiotropium Bromide Monohydrate (Spiriva Respimat) 1.25 MCG/ACT aerosol solution inhaler, Inhale 2 puffs Daily., Disp: 4 g, Rfl: 8  •  Toujeo Max SoloStar 300 UNIT/ML solution pen-injector injection, INJECT 54 UNITS UNDER THE SKIN IN THE MORNING AND 54 UNITS IN THE EVENING, Disp: , Rfl:   •  venlafaxine (EFFEXOR) 75 MG tablet, Take 75 mg by mouth 2 (Two) Times a Day., Disp: , Rfl:       The following portions of the patient's history were reviewed and updated as appropriate: allergies, current medications, past family history, past medical history, past social history, past surgical history and problem list.    Social History     Tobacco Use   • Smoking status: Former     Packs/day: 2.00     Years: 28.00     Pack years: 56.00     Types: Cigarettes     Quit date:      Years since quittin.0   • Smokeless tobacco: Never   Vaping Use   • Vaping Use: Never used   Substance Use Topics   • Alcohol use: Never   • Drug use: Never       ROS    Objective   Vitals:    23 0901   BP: 152/80   BP Location: Left arm   Patient Position: Sitting   Cuff Size: Adult   Pulse: 83   SpO2: 91%   Weight: 113 kg (249 lb)   Height: 157.5 cm (62\")     Body mass index is 45.54 kg/m².    Stress Test 3/28/22      Vitals reviewed.   Constitutional:       Appearance: Healthy appearance. Well-developed and not in distress.   HENT:      Head: Normocephalic and atraumatic.   Neck:      Vascular: No JVD.   Pulmonary:      Effort: Pulmonary effort is normal.      Breath sounds: Normal breath sounds. No wheezing. No rales.   Cardiovascular:      Normal rate. " Regular rhythm.      Murmurs: There is no murmur.      . No S3 and S4 gallop.   Edema:     Peripheral edema absent.   Abdominal:      General: Bowel sounds are normal.      Palpations: Abdomen is soft.   Skin:     General: Skin is warm and dry.   Neurological:      Mental Status: Alert, oriented to person, place, and time and oriented to person, place and time.   Psychiatric:         Mood and Affect: Mood normal.         Behavior: Behavior normal.         Lab Results   Component Value Date     (L) 07/18/2022    K 3.9 11/10/2022    CL 93 (L) 07/18/2022    CO2 26.0 07/18/2022    BUN 10 07/18/2022    CREATININE 0.82 07/18/2022    GLUCOSE 93 07/18/2022    CALCIUM 8.9 07/18/2022    AST 20 07/18/2022    ALT 12 07/18/2022    ALKPHOS 64 07/18/2022     No results found for: CKTOTAL  Lab Results   Component Value Date    WBC 8.39 03/22/2022    HGB 11.9 (L) 03/22/2022    HCT 39.3 03/22/2022     (H) 03/22/2022     Lab Results   Component Value Date    INR 1.01 08/11/2022     No results found for: MG  No results found for: TSH, PSA, CHLPL, TRIG, HDL, LDL   No results found for: BNP        Procedures      Assessment & Plan    Diagnosis Plan   1. Palpitations  metoprolol tartrate (LOPRESSOR) 50 MG tablet    Cardiac Event Monitor      2. Essential hypertension        3. Precordial pain        4. Dyspnea on exertion                     Recommendations:    1. Since she did have a stress test in March 2022 which revealed no evidence of myocardial ischemia.  We will continue with Plavix, atorvastatin, lisinopril, metoprolol, and isosorbide.  2. She does have palpitations which occur with the chest discomfort.  We will order a 30-day monitor to rule out any cardiac arrhythmias which could be causing her symptoms.  3. Will increase metoprolol to tartrate to 50 mg twice daily  4. Follow-up in 6 to 7 weeks or sooner if needed.        Return in about 7 weeks (around 3/7/2023) for Recheck.    As always, I appreciate very much  the opportunity to participate in the cardiovascular care of your patients.      With Best Regards,    TIMA Ramirez

## 2023-01-27 ENCOUNTER — CLINICAL SUPPORT (OUTPATIENT)
Dept: PULMONOLOGY | Facility: CLINIC | Age: 68
End: 2023-01-27
Payer: COMMERCIAL

## 2023-01-27 DIAGNOSIS — J45.50 SEVERE PERSISTENT ASTHMA WITHOUT COMPLICATION: Primary | ICD-10-CM

## 2023-01-27 PROCEDURE — 96372 THER/PROPH/DIAG INJ SC/IM: CPT | Performed by: PHYSICIAN ASSISTANT

## 2023-02-13 ENCOUNTER — TREATMENT (OUTPATIENT)
Dept: CARDIOLOGY | Facility: CLINIC | Age: 68
End: 2023-02-13
Payer: COMMERCIAL

## 2023-02-13 DIAGNOSIS — R00.2 PALPITATIONS: ICD-10-CM

## 2023-02-13 PROCEDURE — 93272 ECG/REVIEW INTERPRET ONLY: CPT | Performed by: INTERNAL MEDICINE

## 2023-02-13 PROCEDURE — 93270 REMOTE 30 DAY ECG REV/REPORT: CPT | Performed by: INTERNAL MEDICINE

## 2023-02-24 ENCOUNTER — CLINICAL SUPPORT (OUTPATIENT)
Dept: PULMONOLOGY | Facility: CLINIC | Age: 68
End: 2023-02-24
Payer: COMMERCIAL

## 2023-02-24 DIAGNOSIS — J45.50 SEVERE PERSISTENT ASTHMA, UNSPECIFIED WHETHER COMPLICATED: ICD-10-CM

## 2023-02-24 PROCEDURE — 96372 THER/PROPH/DIAG INJ SC/IM: CPT | Performed by: PHYSICIAN ASSISTANT

## 2023-03-09 ENCOUNTER — TREATMENT (OUTPATIENT)
Dept: CARDIOLOGY | Facility: CLINIC | Age: 68
End: 2023-03-09
Payer: COMMERCIAL

## 2023-03-10 ENCOUNTER — OFFICE VISIT (OUTPATIENT)
Dept: CARDIOLOGY | Facility: CLINIC | Age: 68
End: 2023-03-10
Payer: COMMERCIAL

## 2023-03-10 VITALS
SYSTOLIC BLOOD PRESSURE: 143 MMHG | WEIGHT: 249.2 LBS | DIASTOLIC BLOOD PRESSURE: 63 MMHG | BODY MASS INDEX: 45.86 KG/M2 | HEART RATE: 66 BPM | OXYGEN SATURATION: 90 % | HEIGHT: 62 IN

## 2023-03-10 DIAGNOSIS — R07.2 PRECORDIAL PAIN: ICD-10-CM

## 2023-03-10 DIAGNOSIS — R00.2 PALPITATIONS: Primary | ICD-10-CM

## 2023-03-10 PROCEDURE — 99214 OFFICE O/P EST MOD 30 MIN: CPT | Performed by: PHYSICIAN ASSISTANT

## 2023-03-10 PROCEDURE — 3077F SYST BP >= 140 MM HG: CPT | Performed by: PHYSICIAN ASSISTANT

## 2023-03-10 PROCEDURE — 3078F DIAST BP <80 MM HG: CPT | Performed by: PHYSICIAN ASSISTANT

## 2023-03-10 RX ORDER — METOPROLOL TARTRATE 50 MG/1
75 TABLET, FILM COATED ORAL 2 TIMES DAILY
Qty: 60 TABLET | Refills: 5 | Status: SHIPPED | OUTPATIENT
Start: 2023-03-10

## 2023-03-10 NOTE — PROGRESS NOTES
Nela Shaw MD  Josefina Cheng  1955  03/10/2023    Patient Active Problem List   Diagnosis   • Asthmatic bronchitis   • Former smoker   • Primary hypertension   • Chronic respiratory failure with hypoxia (HCC)       Dear Nela Shaw MD:    Subjective     History of Present Illness:    Chief Complaint   Patient presents with   • Results     Event monitor       Josefina Cheng is a pleasant 68 y.o. female with a past medical history significant for no known coronary artery disease but does have diabetes mellitus, history of tobacco abuse but has not smoked in 12 years (around 2010), she does have essential hypertension and dyslipidemia.  She comes in today for results of Holter monitor.    Suri's Holter monitor did reveal predominantly sinus rhythm however did have intermittent runs of possible SVT versus atrial flutter with rapid ventricular rate however also unable to rule out possible ventricular tachycardia.  Clinically she reports that she does still feel some palpitations throughout the day during her chores however particularly when she lays down at night is when they are most prominent.  When the palpitations are present she does deny any syncope, near syncope, or falls.  When asked she does report some upper back pain between her shoulder blades that occurs at rest.                Allergies   Allergen Reactions   • Adhesive Tape Rash   • Cephalexin Hives   • Cephalosporins Hives   • Ciprofloxacin Hcl Hives   • Penicillins Hives   :      Current Outpatient Medications:   •  albuterol sulfate  (90 Base) MCG/ACT inhaler, Inhale 2 puffs Every 4 (Four) Hours As Needed for Wheezing or Shortness of Air., Disp: 18 g, Rfl: 8  •  ascorbic acid (VITAMIN C) 1000 MG tablet, Take 1 tablet by mouth Daily., Disp: , Rfl:   •  atorvastatin (LIPITOR) 20 MG tablet, Take 1 tablet by mouth Daily., Disp: , Rfl:   •  calcium citrate-vitamin d (CALCITRATE) 315-250 MG-UNIT tablet tablet, Take  by mouth Daily.,  Disp: , Rfl:   •  cholecalciferol (VITAMIN D3) 1.25 MG (57452 UT) capsule, Vitamin D3  Daily, Disp: , Rfl:   •  clopidogrel (PLAVIX) 75 MG tablet, Take 1 tablet by mouth Daily., Disp: 30 tablet, Rfl: 11  •  dapagliflozin Propanediol (Farxiga) 10 MG tablet, Take  by mouth Daily., Disp: , Rfl:   •  fenofibrate 160 MG tablet, Take 1 tablet by mouth Daily., Disp: , Rfl:   •  fexofenadine (Allegra Allergy) 180 MG tablet, Take 1 tablet by mouth Daily., Disp: 30 tablet, Rfl: 5  •  HYDROcodone-acetaminophen (NORCO) 5-325 MG per tablet, Take 1 tablet by mouth Every 4 (Four) Hours As Needed. for pain, Disp: , Rfl:   •  ipratropium-albuterol (DUO-NEB) 0.5-2.5 mg/3 ml nebulizer, Take 3 mL by nebulization 4 (Four) Times a Day As Needed for Wheezing or Shortness of Air., Disp: 360 mL, Rfl: 8  •  isosorbide mononitrate (IMDUR) 60 MG 24 hr tablet, Take 1 tablet by mouth Daily., Disp: 90 tablet, Rfl: 3  •  lisinopril (PRINIVIL,ZESTRIL) 2.5 MG tablet, Take 1 tablet by mouth Daily. FOR BLOOD PRESSURE, Disp: , Rfl:   •  metFORMIN ER (GLUCOPHAGE-XR) 500 MG 24 hr tablet, TAKE TWO TABLETS BY MOUTH TWO TIMES A DAY FOR BLOOD SUGAR, Disp: , Rfl:   •  metoprolol tartrate (LOPRESSOR) 50 MG tablet, Take 1 tablet by mouth 2 (Two) Times a Day., Disp: 60 tablet, Rfl: 5  •  nitroglycerin (NITROSTAT) 0.4 MG SL tablet, 1 under the tongue as needed for angina, may repeat q5mins for up three doses, Disp: 25 tablet, Rfl: 2  •  omeprazole (priLOSEC) 40 MG capsule, , Disp: , Rfl:   •  ondansetron (ZOFRAN) 4 MG tablet, Every 12 (Twelve) Hours., Disp: , Rfl:   •  promethazine (PHENERGAN) 25 MG tablet, Take 1 tablet by mouth Every 6 (Six) Hours As Needed for Nausea or Vomiting., Disp: , Rfl:   •  Tezepelumab-ekko (Tezspire) 210 MG/1.91ML injection, Inject 1.91 mL under the skin into the appropriate area as directed Every 28 (Twenty-Eight) Days., Disp: 1.91 mL, Rfl: 5  •  Tiotropium Bromide Monohydrate (Spiriva Respimat) 1.25 MCG/ACT aerosol solution inhaler,  "Inhale 2 puffs Daily., Disp: 4 g, Rfl: 8  •  Vanna Hogue SoloStar 300 UNIT/ML solution pen-injector injection, INJECT 54 UNITS UNDER THE SKIN IN THE MORNING AND 54 UNITS IN THE EVENING, Disp: , Rfl:   •  venlafaxine (EFFEXOR) 75 MG tablet, Take 1 tablet by mouth 2 (Two) Times a Day., Disp: , Rfl:   •  budesonide-formoterol (Symbicort) 160-4.5 MCG/ACT inhaler, Inhale 2 puffs 2 (Two) Times a Day., Disp: 1 each, Rfl: 12  •  guaiFENesin (MUCINEX) 600 MG 12 hr tablet, Take 1,200 mg by mouth 2 (Two) Times a Day., Disp: , Rfl:   •  insulin aspart (novoLOG) 100 UNIT/ML injection, Inject  under the skin 3 (Three) Times a Day Before Meals., Disp: , Rfl:   •  Multiple Vitamins-Minerals (ZINC PO), Take  by mouth., Disp: , Rfl:     The following portions of the patient's history were reviewed and updated as appropriate: allergies, current medications, past family history, past medical history, past social history, past surgical history and problem list.    Social History     Tobacco Use   • Smoking status: Former     Packs/day: 2.00     Years: 28.00     Pack years: 56.00     Types: Cigarettes     Quit date:      Years since quittin.1   • Smokeless tobacco: Never   Vaping Use   • Vaping Use: Never used   Substance Use Topics   • Alcohol use: Never   • Drug use: Never         Objective   Vitals:    03/10/23 1045   BP: 143/63   Pulse: 66   SpO2: 90%   Weight: 113 kg (249 lb 3.2 oz)   Height: 157.5 cm (62\")     Body mass index is 45.58 kg/m².    Constitutional:       General: Not in acute distress.     Appearance: Healthy appearance. Well-developed and not in distress. Not diaphoretic.   Eyes:      Conjunctiva/sclera: Conjunctivae normal.      Pupils: Pupils are equal, round, and reactive to light.   HENT:      Head: Normocephalic and atraumatic.   Neck:      Vascular: No carotid bruit or JVD.   Pulmonary:      Effort: Pulmonary effort is normal. No respiratory distress.      Breath sounds: Normal breath sounds. "   Cardiovascular:      Normal rate. Regular rhythm.      Murmurs: There is a grade 2/6 harsh midsystolic murmur at the URSB, radiating to the neck.   Skin:     General: Skin is cool.   Neurological:      Mental Status: Alert, oriented to person, place, and time and oriented to person, place and time.       Lab Results   Component Value Date     (L) 07/18/2022    K 3.9 11/10/2022    CL 93 (L) 07/18/2022    CO2 26.0 07/18/2022    BUN 10 07/18/2022    CREATININE 0.82 07/18/2022    GLUCOSE 93 07/18/2022    CALCIUM 8.9 07/18/2022    AST 20 07/18/2022    ALT 12 07/18/2022    ALKPHOS 64 07/18/2022     No results found for: CKTOTAL  Lab Results   Component Value Date    WBC 8.39 03/22/2022    HGB 11.9 (L) 03/22/2022    HCT 39.3 03/22/2022     (H) 03/22/2022     Lab Results   Component Value Date    INR 1.01 08/11/2022     No results found for: MG  No results found for: TSH, PSA, CHLPL, TRIG, HDL, LDL   No results found for: BNP    During this visit the following were done:  Labs Reviewed []    Labs Ordered []    Radiology Reports Reviewed []    Radiology Ordered []    PCP Records Reviewed []    Referring Provider Records Reviewed []    ER Records Reviewed []    Hospital Records Reviewed []    History Obtained From Family []    Radiology Images Reviewed []    Other Reviewed []    Records Requested []       Procedures    Assessment & Plan    Diagnosis Plan   1. Palpitations  Ambulatory Referral to Cardiac Electrophysiology    Stress Test With Myocardial Perfusion One Day    Adult Transthoracic Echo Complete W/ Cont if Necessary Per Protocol      2. Precordial pain  Stress Test With Myocardial Perfusion One Day    Adult Transthoracic Echo Complete W/ Cont if Necessary Per Protocol               Recommendations:  1. Cardiac dysrhythmias SVT versus atrial flutter versus wide-complex tachycardia  a. Refer to electrophysiology for opinion on underlying cardiac dysrhythmia.  Patient was agreeable.  b. Given possibility  of wide-complex tachycardia and upper back pain which could be possible anginal equivalent we will repeat stress test and echocardiogram last one being in 2021 being unremarkable.  However she does have risk factors for coronary artery disease with I do think this needs to be ruled out.  c. I will increase metoprolol to 75 mg daily.   d. Check cmp, tsh, and mag       No follow-ups on file.    As always, I appreciate very much the opportunity to participate in the cardiovascular care of your patients.      With Best Regards,    Jay Jones PA-C

## 2023-03-16 RX ORDER — TEZEPELUMAB-EKKO 210 MG/1.9ML
INJECTION, SOLUTION SUBCUTANEOUS
Qty: 2 EACH | Refills: 4 | Status: SHIPPED | OUTPATIENT
Start: 2023-03-16

## 2023-03-17 PROBLEM — R00.2 PALPITATIONS: Status: ACTIVE | Noted: 2023-03-17

## 2023-03-24 ENCOUNTER — CLINICAL SUPPORT (OUTPATIENT)
Dept: PULMONOLOGY | Facility: CLINIC | Age: 68
End: 2023-03-24
Payer: COMMERCIAL

## 2023-03-24 DIAGNOSIS — J45.50 SEVERE PERSISTENT ASTHMA WITHOUT COMPLICATION: Primary | ICD-10-CM

## 2023-03-24 PROCEDURE — 96372 THER/PROPH/DIAG INJ SC/IM: CPT | Performed by: PHYSICIAN ASSISTANT

## 2023-04-04 ENCOUNTER — APPOINTMENT (OUTPATIENT)
Dept: CT IMAGING | Facility: HOSPITAL | Age: 68
End: 2023-04-04
Payer: MEDICARE

## 2023-04-04 ENCOUNTER — HOSPITAL ENCOUNTER (EMERGENCY)
Facility: HOSPITAL | Age: 68
Discharge: HOME | End: 2023-04-04
Attending: STUDENT IN AN ORGANIZED HEALTH CARE EDUCATION/TRAINING PROGRAM | Admitting: STUDENT IN AN ORGANIZED HEALTH CARE EDUCATION/TRAINING PROGRAM
Payer: MEDICARE

## 2023-04-04 VITALS
HEART RATE: 103 BPM | SYSTOLIC BLOOD PRESSURE: 119 MMHG | BODY MASS INDEX: 45.82 KG/M2 | HEIGHT: 62 IN | RESPIRATION RATE: 18 BRPM | DIASTOLIC BLOOD PRESSURE: 57 MMHG | OXYGEN SATURATION: 91 % | WEIGHT: 249 LBS | TEMPERATURE: 97 F

## 2023-04-04 DIAGNOSIS — J18.9 PNEUMONIA OF LEFT LOWER LOBE DUE TO INFECTIOUS ORGANISM: Primary | ICD-10-CM

## 2023-04-04 DIAGNOSIS — A08.4 VIRAL GASTROENTERITIS: ICD-10-CM

## 2023-04-04 LAB
ALBUMIN SERPL-MCNC: 3.3 G/DL (ref 3.5–5.2)
ALBUMIN/GLOB SERPL: 1 G/DL
ALP SERPL-CCNC: 108 U/L (ref 39–117)
ALT SERPL W P-5'-P-CCNC: <5 U/L (ref 1–33)
ANION GAP SERPL CALCULATED.3IONS-SCNC: 9.4 MMOL/L (ref 5–15)
AST SERPL-CCNC: 9 U/L (ref 1–32)
BASOPHILS # BLD AUTO: 0.03 10*3/MM3 (ref 0–0.2)
BASOPHILS NFR BLD AUTO: 0.3 % (ref 0–1.5)
BILIRUB SERPL-MCNC: 0.6 MG/DL (ref 0–1.2)
BILIRUB UR QL STRIP: NEGATIVE
BUN SERPL-MCNC: 9 MG/DL (ref 8–23)
BUN/CREAT SERPL: 13 (ref 7–25)
CALCIUM SPEC-SCNC: 8.6 MG/DL (ref 8.6–10.5)
CHLORIDE SERPL-SCNC: 96 MMOL/L (ref 98–107)
CLARITY UR: CLEAR
CO2 SERPL-SCNC: 28.6 MMOL/L (ref 22–29)
COLOR UR: YELLOW
CREAT SERPL-MCNC: 0.69 MG/DL (ref 0.57–1)
DEPRECATED RDW RBC AUTO: 48.2 FL (ref 37–54)
EGFRCR SERPLBLD CKD-EPI 2021: 94.1 ML/MIN/1.73
EOSINOPHIL # BLD AUTO: 0.03 10*3/MM3 (ref 0–0.4)
EOSINOPHIL NFR BLD AUTO: 0.3 % (ref 0.3–6.2)
ERYTHROCYTE [DISTWIDTH] IN BLOOD BY AUTOMATED COUNT: 15 % (ref 12.3–15.4)
GLOBULIN UR ELPH-MCNC: 3.4 GM/DL
GLUCOSE SERPL-MCNC: 136 MG/DL (ref 65–99)
GLUCOSE UR STRIP-MCNC: ABNORMAL MG/DL
HCT VFR BLD AUTO: 44.3 % (ref 34–46.6)
HGB BLD-MCNC: 13.6 G/DL (ref 12–15.9)
HGB UR QL STRIP.AUTO: NEGATIVE
HOLD SPECIMEN: NORMAL
HOLD SPECIMEN: NORMAL
IMM GRANULOCYTES # BLD AUTO: 0.05 10*3/MM3 (ref 0–0.05)
IMM GRANULOCYTES NFR BLD AUTO: 0.5 % (ref 0–0.5)
KETONES UR QL STRIP: NEGATIVE
LEUKOCYTE ESTERASE UR QL STRIP.AUTO: NEGATIVE
LIPASE SERPL-CCNC: 18 U/L (ref 13–60)
LYMPHOCYTES # BLD AUTO: 0.39 10*3/MM3 (ref 0.7–3.1)
LYMPHOCYTES NFR BLD AUTO: 3.7 % (ref 19.6–45.3)
MCH RBC QN AUTO: 26.7 PG (ref 26.6–33)
MCHC RBC AUTO-ENTMCNC: 30.7 G/DL (ref 31.5–35.7)
MCV RBC AUTO: 86.9 FL (ref 79–97)
MONOCYTES # BLD AUTO: 0.31 10*3/MM3 (ref 0.1–0.9)
MONOCYTES NFR BLD AUTO: 3 % (ref 5–12)
NEUTROPHILS NFR BLD AUTO: 9.69 10*3/MM3 (ref 1.7–7)
NEUTROPHILS NFR BLD AUTO: 92.2 % (ref 42.7–76)
NITRITE UR QL STRIP: NEGATIVE
NRBC BLD AUTO-RTO: 0 /100 WBC (ref 0–0.2)
PH UR STRIP.AUTO: 6 [PH] (ref 5–8)
PLATELET # BLD AUTO: 311 10*3/MM3 (ref 140–450)
PMV BLD AUTO: 8.6 FL (ref 6–12)
POTASSIUM SERPL-SCNC: 4.2 MMOL/L (ref 3.5–5.2)
PROT SERPL-MCNC: 6.7 G/DL (ref 6–8.5)
PROT UR QL STRIP: NEGATIVE
RBC # BLD AUTO: 5.1 10*6/MM3 (ref 3.77–5.28)
SODIUM SERPL-SCNC: 134 MMOL/L (ref 136–145)
SP GR UR STRIP: >=1.03 (ref 1–1.03)
UROBILINOGEN UR QL STRIP: ABNORMAL
WBC NRBC COR # BLD: 10.5 10*3/MM3 (ref 3.4–10.8)
WHOLE BLOOD HOLD COAG: NORMAL
WHOLE BLOOD HOLD SPECIMEN: NORMAL

## 2023-04-04 PROCEDURE — 25510000001 IOPAMIDOL 61 % SOLUTION: Performed by: STUDENT IN AN ORGANIZED HEALTH CARE EDUCATION/TRAINING PROGRAM

## 2023-04-04 PROCEDURE — 81003 URINALYSIS AUTO W/O SCOPE: CPT | Performed by: PHYSICIAN ASSISTANT

## 2023-04-04 PROCEDURE — 74177 CT ABD & PELVIS W/CONTRAST: CPT

## 2023-04-04 PROCEDURE — 36415 COLL VENOUS BLD VENIPUNCTURE: CPT

## 2023-04-04 PROCEDURE — 99284 EMERGENCY DEPT VISIT MOD MDM: CPT

## 2023-04-04 PROCEDURE — 74177 CT ABD & PELVIS W/CONTRAST: CPT | Performed by: RADIOLOGY

## 2023-04-04 PROCEDURE — 85025 COMPLETE CBC W/AUTO DIFF WBC: CPT | Performed by: PHYSICIAN ASSISTANT

## 2023-04-04 PROCEDURE — 83690 ASSAY OF LIPASE: CPT | Performed by: PHYSICIAN ASSISTANT

## 2023-04-04 PROCEDURE — 25010000002 ONDANSETRON PER 1 MG: Performed by: PHYSICIAN ASSISTANT

## 2023-04-04 PROCEDURE — 96374 THER/PROPH/DIAG INJ IV PUSH: CPT

## 2023-04-04 PROCEDURE — 80053 COMPREHEN METABOLIC PANEL: CPT | Performed by: PHYSICIAN ASSISTANT

## 2023-04-04 RX ORDER — ACETAMINOPHEN 500 MG
1000 TABLET ORAL ONCE
Status: COMPLETED | OUTPATIENT
Start: 2023-04-04 | End: 2023-04-04

## 2023-04-04 RX ORDER — ONDANSETRON 2 MG/ML
4 INJECTION INTRAMUSCULAR; INTRAVENOUS ONCE
Status: COMPLETED | OUTPATIENT
Start: 2023-04-04 | End: 2023-04-04

## 2023-04-04 RX ORDER — DOXYCYCLINE HYCLATE 100 MG/1
100 TABLET, DELAYED RELEASE ORAL 2 TIMES DAILY
Qty: 20 TABLET | Refills: 0 | Status: SHIPPED | OUTPATIENT
Start: 2023-04-04 | End: 2023-04-14

## 2023-04-04 RX ORDER — DOXYCYCLINE 100 MG/1
100 CAPSULE ORAL ONCE
Status: COMPLETED | OUTPATIENT
Start: 2023-04-04 | End: 2023-04-04

## 2023-04-04 RX ORDER — ONDANSETRON 4 MG/1
4 TABLET, ORALLY DISINTEGRATING ORAL EVERY 6 HOURS PRN
Qty: 12 TABLET | Refills: 0 | Status: SHIPPED | OUTPATIENT
Start: 2023-04-04

## 2023-04-04 RX ORDER — SODIUM CHLORIDE 0.9 % (FLUSH) 0.9 %
10 SYRINGE (ML) INJECTION AS NEEDED
Status: DISCONTINUED | OUTPATIENT
Start: 2023-04-04 | End: 2023-04-04 | Stop reason: HOSPADM

## 2023-04-04 RX ADMIN — ONDANSETRON 4 MG: 2 INJECTION INTRAMUSCULAR; INTRAVENOUS at 10:58

## 2023-04-04 RX ADMIN — DOXYCYCLINE 100 MG: 100 CAPSULE ORAL at 14:55

## 2023-04-04 RX ADMIN — ACETAMINOPHEN 1000 MG: 500 TABLET ORAL at 14:04

## 2023-04-04 RX ADMIN — SODIUM CHLORIDE 1000 ML: 9 INJECTION, SOLUTION INTRAVENOUS at 10:58

## 2023-04-04 RX ADMIN — IOPAMIDOL 89 ML: 612 INJECTION, SOLUTION INTRAVENOUS at 13:32

## 2023-04-04 NOTE — ED PROVIDER NOTES
Subjective   History of Present Illness  67 yo female patient with hx of DM and HTN presents to the ED with complaints of generalized abdominal pain, nausea, vomiting, and diarrhea x 2 days. Patient states her grandchildren and  all have the same symptoms. She was see yesterday at PCP office and tested negative for flu, covid, and strep. Patient denies any worsening or alleviating factors.          Review of Systems   Constitutional: Negative.    HENT: Negative.    Eyes: Negative.    Respiratory: Negative.    Cardiovascular: Negative.    Gastrointestinal: Positive for abdominal pain, diarrhea, nausea and vomiting.   Endocrine: Negative.    Genitourinary: Negative.    Musculoskeletal: Negative.    Skin: Negative.    Allergic/Immunologic: Negative.    Neurological: Negative.    Hematological: Negative.    Psychiatric/Behavioral: Negative.    All other systems reviewed and are negative.      Past Medical History:   Diagnosis Date   • Diabetes mellitus    • Diverticulitis    • Hypertension        Allergies   Allergen Reactions   • Adhesive Tape Rash   • Cephalexin Hives   • Cephalosporins Hives   • Ciprofloxacin Hcl Hives   • Penicillins Hives       Past Surgical History:   Procedure Laterality Date   • ANKLE SURGERY     • BACK SURGERY     • CHOLECYSTECTOMY     • GASTRIC STIMULATOR IMPLANT SURGERY  2017    changed battery 2022   • HAND SURGERY     • HERNIA REPAIR     • HYSTERECTOMY     • KNEE SURGERY     • TUBAL ABDOMINAL LIGATION         Family History   Problem Relation Age of Onset   • Cancer Father    • Cancer Sister        Social History     Socioeconomic History   • Marital status:    Tobacco Use   • Smoking status: Former     Packs/day: 2.00     Years: 28.00     Pack years: 56.00     Types: Cigarettes     Quit date:      Years since quittin.2   • Smokeless tobacco: Never   Vaping Use   • Vaping Use: Never used   Substance and Sexual Activity   • Alcohol use: Never   • Drug use: Never   •  Sexual activity: Defer           Objective   Physical Exam  Vitals and nursing note reviewed.   Constitutional:       Appearance: Normal appearance. She is normal weight.   HENT:      Head: Normocephalic and atraumatic.      Right Ear: External ear normal.      Left Ear: External ear normal.      Nose: Nose normal.      Mouth/Throat:      Mouth: Mucous membranes are moist.      Pharynx: Oropharynx is clear.   Eyes:      Extraocular Movements: Extraocular movements intact.      Conjunctiva/sclera: Conjunctivae normal.      Pupils: Pupils are equal, round, and reactive to light.   Cardiovascular:      Rate and Rhythm: Normal rate and regular rhythm.      Pulses: Normal pulses.      Heart sounds: Normal heart sounds.   Pulmonary:      Effort: Pulmonary effort is normal.      Breath sounds: Normal breath sounds.   Abdominal:      General: Abdomen is flat. Bowel sounds are normal.      Palpations: Abdomen is soft.   Musculoskeletal:         General: Normal range of motion.      Cervical back: Normal range of motion and neck supple.   Skin:     General: Skin is warm and dry.      Capillary Refill: Capillary refill takes less than 2 seconds.   Neurological:      General: No focal deficit present.      Mental Status: She is alert and oriented to person, place, and time. Mental status is at baseline.   Psychiatric:         Mood and Affect: Mood normal.         Behavior: Behavior normal.         Thought Content: Thought content normal.         Judgment: Judgment normal.         Procedures           ED Course  ED Course as of 04/04/23 1447   Tue Apr 04, 2023   1431 CT Abdomen Pelvis With Contrast  IMPRESSION:  1.  Left lower lobe airspace disease likely pneumonia.  2.  Fatty infiltration of liver.  3.  Prior hysterectomy and cholecystectomy.  4.  Diverticulosis without diverticulitis.  5.  Other incidental/nonacute findings described above.     This report was finalized on 4/4/2023 2:07 PM by Dr. Chris Robert MD.            Specimen Collected: 04/04/23 14:04 EDT Last Resulted: 04/04/23 14:07 EDT         [ML]   1439 Pt states she is feeling better and ready to go home.  [ML]      ED Course User Index  [ML] Ginny An PA           CT Abdomen Pelvis With Contrast    Result Date: 4/4/2023  1.  Left lower lobe airspace disease likely pneumonia. 2.  Fatty infiltration of liver. 3.  Prior hysterectomy and cholecystectomy. 4.  Diverticulosis without diverticulitis. 5.  Other incidental/nonacute findings described above.  This report was finalized on 4/4/2023 2:07 PM by Dr. Chris Robert MD.      Results for orders placed or performed during the hospital encounter of 04/04/23   Comprehensive Metabolic Panel    Specimen: Arm, Right; Blood   Result Value Ref Range    Glucose 136 (H) 65 - 99 mg/dL    BUN 9 8 - 23 mg/dL    Creatinine 0.69 0.57 - 1.00 mg/dL    Sodium 134 (L) 136 - 145 mmol/L    Potassium 4.2 3.5 - 5.2 mmol/L    Chloride 96 (L) 98 - 107 mmol/L    CO2 28.6 22.0 - 29.0 mmol/L    Calcium 8.6 8.6 - 10.5 mg/dL    Total Protein 6.7 6.0 - 8.5 g/dL    Albumin 3.3 (L) 3.5 - 5.2 g/dL    ALT (SGPT) <5 1 - 33 U/L    AST (SGOT) 9 1 - 32 U/L    Alkaline Phosphatase 108 39 - 117 U/L    Total Bilirubin 0.6 0.0 - 1.2 mg/dL    Globulin 3.4 gm/dL    A/G Ratio 1.0 g/dL    BUN/Creatinine Ratio 13.0 7.0 - 25.0    Anion Gap 9.4 5.0 - 15.0 mmol/L    eGFR 94.1 >60.0 mL/min/1.73   Lipase    Specimen: Arm, Right; Blood   Result Value Ref Range    Lipase 18 13 - 60 U/L   Urinalysis With Microscopic If Indicated (No Culture) - Urine, Clean Catch    Specimen: Urine, Clean Catch   Result Value Ref Range    Color, UA Yellow Yellow, Straw    Appearance, UA Clear Clear    pH, UA 6.0 5.0 - 8.0    Specific Gravity, UA >=1.030 1.005 - 1.030    Glucose, UA >=1000 mg/dL (3+) (A) Negative    Ketones, UA Negative Negative    Bilirubin, UA Negative Negative    Blood, UA Negative Negative    Protein, UA Negative Negative    Leuk Esterase, UA Negative  Negative    Nitrite, UA Negative Negative    Urobilinogen, UA 1.0 E.U./dL 0.2 - 1.0 E.U./dL   CBC Auto Differential    Specimen: Arm, Right; Blood   Result Value Ref Range    WBC 10.50 3.40 - 10.80 10*3/mm3    RBC 5.10 3.77 - 5.28 10*6/mm3    Hemoglobin 13.6 12.0 - 15.9 g/dL    Hematocrit 44.3 34.0 - 46.6 %    MCV 86.9 79.0 - 97.0 fL    MCH 26.7 26.6 - 33.0 pg    MCHC 30.7 (L) 31.5 - 35.7 g/dL    RDW 15.0 12.3 - 15.4 %    RDW-SD 48.2 37.0 - 54.0 fl    MPV 8.6 6.0 - 12.0 fL    Platelets 311 140 - 450 10*3/mm3    Neutrophil % 92.2 (H) 42.7 - 76.0 %    Lymphocyte % 3.7 (L) 19.6 - 45.3 %    Monocyte % 3.0 (L) 5.0 - 12.0 %    Eosinophil % 0.3 0.3 - 6.2 %    Basophil % 0.3 0.0 - 1.5 %    Immature Grans % 0.5 0.0 - 0.5 %    Neutrophils, Absolute 9.69 (H) 1.70 - 7.00 10*3/mm3    Lymphocytes, Absolute 0.39 (L) 0.70 - 3.10 10*3/mm3    Monocytes, Absolute 0.31 0.10 - 0.90 10*3/mm3    Eosinophils, Absolute 0.03 0.00 - 0.40 10*3/mm3    Basophils, Absolute 0.03 0.00 - 0.20 10*3/mm3    Immature Grans, Absolute 0.05 0.00 - 0.05 10*3/mm3    nRBC 0.0 0.0 - 0.2 /100 WBC   Green Top (Gel)   Result Value Ref Range    Extra Tube Hold for add-ons.    Lavender Top   Result Value Ref Range    Extra Tube hold for add-on    Gold Top - SST   Result Value Ref Range    Extra Tube Hold for add-ons.    Light Blue Top   Result Value Ref Range    Extra Tube Hold for add-ons.                                      Medical Decision Making  69 yo female patient with hx of DM and HTN presents to the ED with complaints of generalized abdominal pain, nausea, vomiting, and diarrhea x 2 days. Patient states her grandchildren and  all have the same symptoms. She was see yesterday at PCP office and tested negative for flu, covid, and strep. Patient denies any worsening or alleviating factors. PT ED stay uncomplicated. She reports she is feeling better and ready to go home. No vomiting or diarrhea. patient started on abx for pneumonia. She will f/u with  PCP. Discussed sx and red flags that would warrant return to the ED     Amount and/or Complexity of Data Reviewed  Labs: ordered.  Radiology: ordered. Decision-making details documented in ED Course.      Risk  OTC drugs.  Prescription drug management.          Final diagnoses:   Pneumonia of left lower lobe due to infectious organism   Viral gastroenteritis       ED Disposition  ED Disposition     ED Disposition   Discharge    Condition   Stable    Comment   --             Nela Shaw MD  [address]  [facility] [address]  [place] [id number] [phone]    Schedule an appointment as soon as possible for a visit in 1 day           Medication List      New Prescriptions    doxycycline 100 MG enteric coated tablet  Commonly known as: DORYX  Take 1 tablet by mouth 2 (Two) Times a Day for 10 days.     ondansetron ODT 4 MG disintegrating tablet  Commonly known as: ZOFRAN-ODT  Place 1 tablet on the tongue Every 6 (Six) Hours As Needed for Nausea or Vomiting.           Where to Get Your Medications      These medications were sent to [facility] [place], [place] - [address] - [phone]  - [phone] FX  [address] [address]    Phone: [phone]   · doxycycline 100 MG enteric coated tablet  · ondansetron ODT 4 MG disintegrating tablet          Ginny An PA  04/04/23 6446

## 2023-04-18 ENCOUNTER — HOSPITAL ENCOUNTER (OUTPATIENT)
Dept: CARDIOLOGY | Facility: HOSPITAL | Age: 68
Discharge: HOME | End: 2023-04-18
Payer: COMMERCIAL

## 2023-04-18 ENCOUNTER — HOSPITAL ENCOUNTER (OUTPATIENT)
Dept: NUCLEAR MEDICINE | Facility: HOSPITAL | Age: 68
Discharge: HOME | End: 2023-04-18
Payer: COMMERCIAL

## 2023-04-18 DIAGNOSIS — R00.2 PALPITATIONS: ICD-10-CM

## 2023-04-18 DIAGNOSIS — R07.2 PRECORDIAL PAIN: ICD-10-CM

## 2023-04-18 LAB
BH CV NUCLEAR PRIOR STUDY: 3
BH CV REST NUCLEAR ISOTOPE DOSE: 10.5 MCI
BH CV STRESS BP STAGE 1: NORMAL
BH CV STRESS COMMENTS STAGE 1: NORMAL
BH CV STRESS DOSE REGADENOSON STAGE 1: 0.4
BH CV STRESS DURATION MIN STAGE 1: 0
BH CV STRESS DURATION SEC STAGE 1: 10
BH CV STRESS HR STAGE 1: 73
BH CV STRESS NUCLEAR ISOTOPE DOSE: 31.1 MCI
BH CV STRESS PROTOCOL 1: NORMAL
BH CV STRESS RECOVERY BP: NORMAL MMHG
BH CV STRESS RECOVERY HR: 62 BPM
BH CV STRESS STAGE 1: 1
MAXIMAL PREDICTED HEART RATE: 151 BPM
PERCENT MAX PREDICTED HR: 48.34 %
STRESS BASELINE BP: NORMAL MMHG
STRESS BASELINE HR: 55 BPM
STRESS PERCENT HR: 57 %
STRESS POST PEAK BP: NORMAL MMHG
STRESS POST PEAK HR: 73 BPM
STRESS TARGET HR: 128 BPM

## 2023-04-18 PROCEDURE — A9500 TC99M SESTAMIBI: HCPCS | Performed by: PHYSICIAN ASSISTANT

## 2023-04-18 PROCEDURE — 25010000002 REGADENOSON 0.4 MG/5ML SOLUTION: Performed by: PHYSICIAN ASSISTANT

## 2023-04-18 PROCEDURE — 78452 HT MUSCLE IMAGE SPECT MULT: CPT | Performed by: INTERNAL MEDICINE

## 2023-04-18 PROCEDURE — 0 TECHNETIUM SESTAMIBI: Performed by: PHYSICIAN ASSISTANT

## 2023-04-18 PROCEDURE — 93306 TTE W/DOPPLER COMPLETE: CPT

## 2023-04-18 PROCEDURE — 93017 CV STRESS TEST TRACING ONLY: CPT

## 2023-04-18 PROCEDURE — 93018 CV STRESS TEST I&R ONLY: CPT | Performed by: INTERNAL MEDICINE

## 2023-04-18 PROCEDURE — 78452 HT MUSCLE IMAGE SPECT MULT: CPT

## 2023-04-18 PROCEDURE — 93306 TTE W/DOPPLER COMPLETE: CPT | Performed by: INTERNAL MEDICINE

## 2023-04-18 RX ADMIN — TECHNETIUM TC 99M SESTAMIBI 1 DOSE: 1 INJECTION INTRAVENOUS at 07:26

## 2023-04-18 RX ADMIN — TECHNETIUM TC 99M SESTAMIBI 1 DOSE: 1 INJECTION INTRAVENOUS at 08:39

## 2023-04-18 RX ADMIN — REGADENOSON 0.4 MG: 0.08 INJECTION, SOLUTION INTRAVENOUS at 08:39

## 2023-04-19 ENCOUNTER — CLINICAL SUPPORT (OUTPATIENT)
Dept: PULMONOLOGY | Facility: CLINIC | Age: 68
End: 2023-04-19
Payer: COMMERCIAL

## 2023-04-19 DIAGNOSIS — J45.50 SEVERE PERSISTENT ASTHMA WITHOUT COMPLICATION: Primary | ICD-10-CM

## 2023-04-20 ENCOUNTER — APPOINTMENT (OUTPATIENT)
Dept: GENERAL RADIOLOGY | Facility: HOSPITAL | Age: 68
End: 2023-04-20
Payer: MEDICARE

## 2023-04-20 ENCOUNTER — HOSPITAL ENCOUNTER (EMERGENCY)
Facility: HOSPITAL | Age: 68
Discharge: HOME | End: 2023-04-20
Attending: EMERGENCY MEDICINE
Payer: MEDICARE

## 2023-04-20 VITALS
DIASTOLIC BLOOD PRESSURE: 72 MMHG | BODY MASS INDEX: 43.43 KG/M2 | HEIGHT: 62 IN | SYSTOLIC BLOOD PRESSURE: 104 MMHG | RESPIRATION RATE: 20 BRPM | OXYGEN SATURATION: 94 % | HEART RATE: 90 BPM | WEIGHT: 236 LBS | TEMPERATURE: 98.5 F

## 2023-04-20 DIAGNOSIS — U07.1 COVID-19: Primary | ICD-10-CM

## 2023-04-20 LAB
ALBUMIN SERPL-MCNC: 3.9 G/DL (ref 3.5–5.2)
ALBUMIN/GLOB SERPL: 1.6 G/DL
ALP SERPL-CCNC: 105 U/L (ref 39–117)
ALT SERPL W P-5'-P-CCNC: 8 U/L (ref 1–33)
ANION GAP SERPL CALCULATED.3IONS-SCNC: 12.8 MMOL/L (ref 5–15)
AST SERPL-CCNC: 14 U/L (ref 1–32)
BASOPHILS # BLD AUTO: 0.05 10*3/MM3 (ref 0–0.2)
BASOPHILS NFR BLD AUTO: 0.5 % (ref 0–1.5)
BILIRUB SERPL-MCNC: 0.4 MG/DL (ref 0–1.2)
BUN SERPL-MCNC: 8 MG/DL (ref 8–23)
BUN/CREAT SERPL: 11.8 (ref 7–25)
CALCIUM SPEC-SCNC: 9.1 MG/DL (ref 8.6–10.5)
CHLORIDE SERPL-SCNC: 94 MMOL/L (ref 98–107)
CO2 SERPL-SCNC: 28.2 MMOL/L (ref 22–29)
CREAT SERPL-MCNC: 0.68 MG/DL (ref 0.57–1)
CRP SERPL-MCNC: 5.59 MG/DL (ref 0–0.5)
D DIMER PPP FEU-MCNC: <0.27 MCGFEU/ML (ref 0–0.69)
DEPRECATED RDW RBC AUTO: 49.9 FL (ref 37–54)
EGFRCR SERPLBLD CKD-EPI 2021: 94.4 ML/MIN/1.73
EOSINOPHIL # BLD AUTO: 0.01 10*3/MM3 (ref 0–0.4)
EOSINOPHIL NFR BLD AUTO: 0.1 % (ref 0.3–6.2)
ERYTHROCYTE [DISTWIDTH] IN BLOOD BY AUTOMATED COUNT: 15.5 % (ref 12.3–15.4)
FERRITIN SERPL-MCNC: 69.96 NG/ML (ref 13–150)
FLUAV RNA RESP QL NAA+PROBE: NOT DETECTED
FLUBV RNA RESP QL NAA+PROBE: NOT DETECTED
GLOBULIN UR ELPH-MCNC: 2.5 GM/DL
GLUCOSE SERPL-MCNC: 111 MG/DL (ref 65–99)
HCT VFR BLD AUTO: 44.3 % (ref 34–46.6)
HGB BLD-MCNC: 13.5 G/DL (ref 12–15.9)
HOLD SPECIMEN: NORMAL
HOLD SPECIMEN: NORMAL
IMM GRANULOCYTES # BLD AUTO: 0.03 10*3/MM3 (ref 0–0.05)
IMM GRANULOCYTES NFR BLD AUTO: 0.3 % (ref 0–0.5)
LDH SERPL-CCNC: 184 U/L (ref 135–214)
LYMPHOCYTES # BLD AUTO: 1.32 10*3/MM3 (ref 0.7–3.1)
LYMPHOCYTES NFR BLD AUTO: 13.7 % (ref 19.6–45.3)
MCH RBC QN AUTO: 27 PG (ref 26.6–33)
MCHC RBC AUTO-ENTMCNC: 30.5 G/DL (ref 31.5–35.7)
MCV RBC AUTO: 88.6 FL (ref 79–97)
MONOCYTES # BLD AUTO: 0.79 10*3/MM3 (ref 0.1–0.9)
MONOCYTES NFR BLD AUTO: 8.2 % (ref 5–12)
NEUTROPHILS NFR BLD AUTO: 7.45 10*3/MM3 (ref 1.7–7)
NEUTROPHILS NFR BLD AUTO: 77.2 % (ref 42.7–76)
NRBC BLD AUTO-RTO: 0 /100 WBC (ref 0–0.2)
PLATELET # BLD AUTO: 267 10*3/MM3 (ref 140–450)
PMV BLD AUTO: 9.1 FL (ref 6–12)
POTASSIUM SERPL-SCNC: 5 MMOL/L (ref 3.5–5.2)
PROCALCITONIN SERPL-MCNC: 0.11 NG/ML (ref 0–0.25)
PROT SERPL-MCNC: 6.4 G/DL (ref 6–8.5)
RBC # BLD AUTO: 5 10*6/MM3 (ref 3.77–5.28)
S PYO AG THROAT QL: NEGATIVE
SARS-COV-2 RNA RESP QL NAA+PROBE: DETECTED
SODIUM SERPL-SCNC: 135 MMOL/L (ref 136–145)
WBC NRBC COR # BLD: 9.65 10*3/MM3 (ref 3.4–10.8)
WHOLE BLOOD HOLD COAG: NORMAL
WHOLE BLOOD HOLD SPECIMEN: NORMAL

## 2023-04-20 PROCEDURE — 85379 FIBRIN DEGRADATION QUANT: CPT | Performed by: PHYSICIAN ASSISTANT

## 2023-04-20 PROCEDURE — 87880 STREP A ASSAY W/OPTIC: CPT | Performed by: PHYSICIAN ASSISTANT

## 2023-04-20 PROCEDURE — 87081 CULTURE SCREEN ONLY: CPT | Performed by: PHYSICIAN ASSISTANT

## 2023-04-20 PROCEDURE — 85025 COMPLETE CBC W/AUTO DIFF WBC: CPT | Performed by: PHYSICIAN ASSISTANT

## 2023-04-20 PROCEDURE — 96374 THER/PROPH/DIAG INJ IV PUSH: CPT

## 2023-04-20 PROCEDURE — 80053 COMPREHEN METABOLIC PANEL: CPT | Performed by: PHYSICIAN ASSISTANT

## 2023-04-20 PROCEDURE — 87636 SARSCOV2 & INF A&B AMP PRB: CPT | Performed by: PHYSICIAN ASSISTANT

## 2023-04-20 PROCEDURE — 71045 X-RAY EXAM CHEST 1 VIEW: CPT | Performed by: RADIOLOGY

## 2023-04-20 PROCEDURE — C9803 HOPD COVID-19 SPEC COLLECT: HCPCS | Performed by: PHYSICIAN ASSISTANT

## 2023-04-20 PROCEDURE — 25010000002 KETOROLAC TROMETHAMINE PER 15 MG: Performed by: PHYSICIAN ASSISTANT

## 2023-04-20 PROCEDURE — 25010000002 DEXAMETHASONE SODIUM PHOSPHATE 10 MG/ML SOLUTION: Performed by: PHYSICIAN ASSISTANT

## 2023-04-20 PROCEDURE — 83615 LACTATE (LD) (LDH) ENZYME: CPT | Performed by: PHYSICIAN ASSISTANT

## 2023-04-20 PROCEDURE — 84145 PROCALCITONIN (PCT): CPT | Performed by: PHYSICIAN ASSISTANT

## 2023-04-20 PROCEDURE — 25010000002 METOCLOPRAMIDE PER 10 MG: Performed by: PHYSICIAN ASSISTANT

## 2023-04-20 PROCEDURE — 96375 TX/PRO/DX INJ NEW DRUG ADDON: CPT

## 2023-04-20 PROCEDURE — 71045 X-RAY EXAM CHEST 1 VIEW: CPT

## 2023-04-20 PROCEDURE — 86140 C-REACTIVE PROTEIN: CPT | Performed by: PHYSICIAN ASSISTANT

## 2023-04-20 PROCEDURE — 82728 ASSAY OF FERRITIN: CPT | Performed by: PHYSICIAN ASSISTANT

## 2023-04-20 PROCEDURE — 99283 EMERGENCY DEPT VISIT LOW MDM: CPT

## 2023-04-20 RX ORDER — DEXAMETHASONE SODIUM PHOSPHATE 10 MG/ML
10 INJECTION, SOLUTION INTRAMUSCULAR; INTRAVENOUS ONCE
Status: COMPLETED | OUTPATIENT
Start: 2023-04-20 | End: 2023-04-20

## 2023-04-20 RX ORDER — METOCLOPRAMIDE HYDROCHLORIDE 5 MG/ML
10 INJECTION INTRAMUSCULAR; INTRAVENOUS ONCE
Status: COMPLETED | OUTPATIENT
Start: 2023-04-20 | End: 2023-04-20

## 2023-04-20 RX ORDER — DEXAMETHASONE 6 MG/1
6 TABLET ORAL
Qty: 5 TABLET | Refills: 0 | Status: SHIPPED | OUTPATIENT
Start: 2023-04-20

## 2023-04-20 RX ORDER — KETOROLAC TROMETHAMINE 30 MG/ML
15 INJECTION, SOLUTION INTRAMUSCULAR; INTRAVENOUS ONCE
Status: COMPLETED | OUTPATIENT
Start: 2023-04-20 | End: 2023-04-20

## 2023-04-20 RX ORDER — SODIUM CHLORIDE 0.9 % (FLUSH) 0.9 %
10 SYRINGE (ML) INJECTION AS NEEDED
Status: DISCONTINUED | OUTPATIENT
Start: 2023-04-20 | End: 2023-04-20 | Stop reason: HOSPADM

## 2023-04-20 RX ADMIN — KETOROLAC TROMETHAMINE 15 MG: 30 INJECTION, SOLUTION INTRAMUSCULAR; INTRAVENOUS at 14:54

## 2023-04-20 RX ADMIN — METOCLOPRAMIDE HYDROCHLORIDE 10 MG: 5 INJECTION INTRAMUSCULAR; INTRAVENOUS at 14:55

## 2023-04-20 RX ADMIN — DEXAMETHASONE SODIUM PHOSPHATE 10 MG: 10 INJECTION INTRAMUSCULAR; INTRAVENOUS at 14:51

## 2023-04-20 NOTE — ED PROVIDER NOTES
Subjective   History of Present Illness  68-year-old female with past medical history of nocturnal hypoxia on 2 L at night since having COVID a couple years ago, hypertension, diverticulitis, and diabetes presents to the emergency room with cough, fever, chills, and headache for 2 days.  Patient states that she has been around her grandchildren who have had upper respiratory symptoms but other than that denies being around any sick contacts or travel.  She denies chest pain, shortness of breath, nausea, vomiting, abdominal pain, or back pain.  Denies any specific aggravating or alleviating factors.  Denies any other complaints or concerns at this time.    History provided by:  Patient   used: No        Review of Systems   Constitutional: Positive for chills and fever.   HENT: Positive for sore throat.    Respiratory: Negative.    Cardiovascular: Negative.  Negative for chest pain.   Gastrointestinal: Negative.  Negative for abdominal pain.   Endocrine: Negative.    Genitourinary: Negative.  Negative for dysuria.   Skin: Negative.    Neurological: Positive for headaches.   Psychiatric/Behavioral: Negative.    All other systems reviewed and are negative.      Past Medical History:   Diagnosis Date   • Diabetes mellitus    • Diverticulitis    • Hypertension        Allergies   Allergen Reactions   • Adhesive Tape Rash   • Cephalexin Hives   • Cephalosporins Hives   • Ciprofloxacin Hcl Hives   • Penicillins Hives       Past Surgical History:   Procedure Laterality Date   • ANKLE SURGERY     • BACK SURGERY     • CHOLECYSTECTOMY     • GASTRIC STIMULATOR IMPLANT SURGERY  2017    changed battery 11/2022   • HAND SURGERY     • HERNIA REPAIR     • HYSTERECTOMY     • KNEE SURGERY     • TUBAL ABDOMINAL LIGATION         Family History   Problem Relation Age of Onset   • Cancer Father    • Cancer Sister        Social History     Socioeconomic History   • Marital status:    Tobacco Use   • Smoking status:  Former     Packs/day: 2.00     Years: 28.00     Pack years: 56.00     Types: Cigarettes     Quit date: 2010     Years since quittin.3   • Smokeless tobacco: Never   Vaping Use   • Vaping Use: Never used   Substance and Sexual Activity   • Alcohol use: Never   • Drug use: Never   • Sexual activity: Defer           Objective   Physical Exam  Vitals and nursing note reviewed.   Constitutional:       General: She is not in acute distress.     Appearance: She is well-developed. She is not diaphoretic.   HENT:      Head: Normocephalic and atraumatic.      Right Ear: External ear normal.      Left Ear: External ear normal.      Nose: Nose normal.   Eyes:      Conjunctiva/sclera: Conjunctivae normal.      Pupils: Pupils are equal, round, and reactive to light.   Neck:      Vascular: No JVD.      Trachea: No tracheal deviation.   Cardiovascular:      Rate and Rhythm: Normal rate and regular rhythm.      Heart sounds: Normal heart sounds. No murmur heard.  Pulmonary:      Effort: Pulmonary effort is normal. No respiratory distress.      Breath sounds: Normal breath sounds. No wheezing.   Abdominal:      General: Bowel sounds are normal.      Palpations: Abdomen is soft.      Tenderness: There is no abdominal tenderness.   Musculoskeletal:         General: No deformity. Normal range of motion.      Cervical back: Normal range of motion and neck supple.   Skin:     General: Skin is warm and dry.      Coloration: Skin is not pale.      Findings: No erythema or rash.   Neurological:      Mental Status: She is alert and oriented to person, place, and time.      Cranial Nerves: No cranial nerve deficit.   Psychiatric:         Behavior: Behavior normal.         Thought Content: Thought content normal.         Procedures           ED Course  ED Course as of 23 2149   Thu 2023   1346 COVID19(!!): Detected [TK]   1504 D-Dimer, Quant: <0.27 [TK]   1544 XR Chest 1 View [TK]      ED Course User Index  [TK] Nevaeh  Mihir MEJIA PA-C             Results for orders placed or performed during the hospital encounter of 04/20/23   COVID-19 and FLU A/B PCR - Swab, Nasopharynx    Specimen: Nasopharynx; Swab   Result Value Ref Range    COVID19 Detected (C) Not Detected - Ref. Range    Influenza A PCR Not Detected Not Detected    Influenza B PCR Not Detected Not Detected   Rapid Strep A Screen - Swab, Throat    Specimen: Throat; Swab   Result Value Ref Range    Strep A Ag Negative Negative   Comprehensive Metabolic Panel    Specimen: Blood   Result Value Ref Range    Glucose 111 (H) 65 - 99 mg/dL    BUN 8 8 - 23 mg/dL    Creatinine 0.68 0.57 - 1.00 mg/dL    Sodium 135 (L) 136 - 145 mmol/L    Potassium 5.0 3.5 - 5.2 mmol/L    Chloride 94 (L) 98 - 107 mmol/L    CO2 28.2 22.0 - 29.0 mmol/L    Calcium 9.1 8.6 - 10.5 mg/dL    Total Protein 6.4 6.0 - 8.5 g/dL    Albumin 3.9 3.5 - 5.2 g/dL    ALT (SGPT) 8 1 - 33 U/L    AST (SGOT) 14 1 - 32 U/L    Alkaline Phosphatase 105 39 - 117 U/L    Total Bilirubin 0.4 0.0 - 1.2 mg/dL    Globulin 2.5 gm/dL    A/G Ratio 1.6 g/dL    BUN/Creatinine Ratio 11.8 7.0 - 25.0    Anion Gap 12.8 5.0 - 15.0 mmol/L    eGFR 94.4 >60.0 mL/min/1.73   D-dimer, Quantitative    Specimen: Blood   Result Value Ref Range    D-Dimer, Quantitative <0.27 0.00 - 0.69 MCGFEU/mL   Ferritin    Specimen: Blood   Result Value Ref Range    Ferritin 69.96 13.00 - 150.00 ng/mL   Lactate Dehydrogenase    Specimen: Blood   Result Value Ref Range     135 - 214 U/L   Procalcitonin    Specimen: Blood   Result Value Ref Range    Procalcitonin 0.11 0.00 - 0.25 ng/mL   CBC Auto Differential    Specimen: Blood   Result Value Ref Range    WBC 9.65 3.40 - 10.80 10*3/mm3    RBC 5.00 3.77 - 5.28 10*6/mm3    Hemoglobin 13.5 12.0 - 15.9 g/dL    Hematocrit 44.3 34.0 - 46.6 %    MCV 88.6 79.0 - 97.0 fL    MCH 27.0 26.6 - 33.0 pg    MCHC 30.5 (L) 31.5 - 35.7 g/dL    RDW 15.5 (H) 12.3 - 15.4 %    RDW-SD 49.9 37.0 - 54.0 fl    MPV 9.1 6.0 - 12.0 fL     Platelets 267 140 - 450 10*3/mm3    Neutrophil % 77.2 (H) 42.7 - 76.0 %    Lymphocyte % 13.7 (L) 19.6 - 45.3 %    Monocyte % 8.2 5.0 - 12.0 %    Eosinophil % 0.1 (L) 0.3 - 6.2 %    Basophil % 0.5 0.0 - 1.5 %    Immature Grans % 0.3 0.0 - 0.5 %    Neutrophils, Absolute 7.45 (H) 1.70 - 7.00 10*3/mm3    Lymphocytes, Absolute 1.32 0.70 - 3.10 10*3/mm3    Monocytes, Absolute 0.79 0.10 - 0.90 10*3/mm3    Eosinophils, Absolute 0.01 0.00 - 0.40 10*3/mm3    Basophils, Absolute 0.05 0.00 - 0.20 10*3/mm3    Immature Grans, Absolute 0.03 0.00 - 0.05 10*3/mm3    nRBC 0.0 0.0 - 0.2 /100 WBC   C-reactive Protein    Specimen: Blood   Result Value Ref Range    C-Reactive Protein 5.59 (H) 0.00 - 0.50 mg/dL   Green Top (Gel)   Result Value Ref Range    Extra Tube Hold for add-ons.    Lavender Top   Result Value Ref Range    Extra Tube hold for add-on    Gold Top - SST   Result Value Ref Range    Extra Tube Hold for add-ons.    Light Blue Top   Result Value Ref Range    Extra Tube Hold for add-ons.        XR Chest 1 View   Final Result   No radiographic evidence of acute cardiac or pulmonary disease.       This report was finalized on 4/20/2023 3:21 PM by Dr. Abdirahman Martinez MD.                                            Medical Decision Making  68-year-old female with past medical history of nocturnal hypoxia on 2 L at night since having COVID a couple years ago, hypertension, diverticulitis, and diabetes presents to the emergency room with cough, fever, chills, and headache for 2 days.  Patient states that she has been around her grandchildren who have had upper respiratory symptoms but other than that denies being around any sick contacts or travel.  She denies chest pain, shortness of breath, nausea, vomiting, abdominal pain, or back pain.  Denies any specific aggravating or alleviating factors.  Denies any other complaints or concerns at this time.    COVID-19: acute illness or injury  Amount and/or Complexity of Data  Reviewed  Labs: ordered. Decision-making details documented in ED Course.  Radiology: ordered. Decision-making details documented in ED Course.      Risk  Prescription drug management.          Final diagnoses:   COVID-19       ED Disposition  ED Disposition     ED Disposition   Discharge    Condition   Stable    Comment   --             Nela Shaw MD  [address]  [facility] [address]  [facility] [id number] [phone]    In 2 days           Medication List      New Prescriptions    dexamethasone 6 MG tablet  Commonly known as: DECADRON  Take 1 tablet by mouth Daily With Breakfast.           Where to Get Your Medications      These medications were sent to [facility], [place] - [address] - [phone]  - [phone] FX  [address] [facility] [address]    Phone: [phone]   · dexamethasone 6 MG tablet          Mihir Herring PAWilfridoC  04/20/23 9034

## 2023-04-22 LAB — BACTERIA SPEC AEROBE CULT: NORMAL

## 2023-04-23 LAB
BH CV ECHO MEAS - AO MAX PG: 16.8 MMHG
BH CV ECHO MEAS - AO MEAN PG: 8 MMHG
BH CV ECHO MEAS - AO ROOT DIAM: 2.6 CM
BH CV ECHO MEAS - AO V2 MAX: 205 CM/SEC
BH CV ECHO MEAS - AO V2 VTI: 49.1 CM
BH CV ECHO MEAS - AVA(I,D): 1.1 CM2
BH CV ECHO MEAS - EDV(CUBED): 97.3 ML
BH CV ECHO MEAS - EDV(MOD-SP4): 40.3 ML
BH CV ECHO MEAS - EF(MOD-SP4): 70.5 %
BH CV ECHO MEAS - ESV(CUBED): 59.3 ML
BH CV ECHO MEAS - ESV(MOD-SP4): 11.9 ML
BH CV ECHO MEAS - FS: 15.2 %
BH CV ECHO MEAS - IVS/LVPW: 1.07 CM
BH CV ECHO MEAS - IVSD: 1.5 CM
BH CV ECHO MEAS - LA DIMENSION: 3.8 CM
BH CV ECHO MEAS - LAT PEAK E' VEL: 9.4 CM/SEC
BH CV ECHO MEAS - LV DIASTOLIC VOL/BSA (35-75): 19.2 CM2
BH CV ECHO MEAS - LV MASS(C)D: 270.6 GRAMS
BH CV ECHO MEAS - LV MAX PG: 3.4 MMHG
BH CV ECHO MEAS - LV MEAN PG: 2 MMHG
BH CV ECHO MEAS - LV SYSTOLIC VOL/BSA (12-30): 5.7 CM2
BH CV ECHO MEAS - LV V1 MAX: 91.7 CM/SEC
BH CV ECHO MEAS - LV V1 VTI: 23.8 CM
BH CV ECHO MEAS - LVIDD: 4.6 CM
BH CV ECHO MEAS - LVIDS: 3.9 CM
BH CV ECHO MEAS - LVOT AREA: 2.27 CM2
BH CV ECHO MEAS - LVOT DIAM: 1.7 CM
BH CV ECHO MEAS - LVPWD: 1.4 CM
BH CV ECHO MEAS - MED PEAK E' VEL: 7.5 CM/SEC
BH CV ECHO MEAS - MV A MAX VEL: 100 CM/SEC
BH CV ECHO MEAS - MV E MAX VEL: 87 CM/SEC
BH CV ECHO MEAS - MV E/A: 0.87
BH CV ECHO MEAS - PA ACC TIME: 0.09 SEC
BH CV ECHO MEAS - PA PR(ACCEL): 37.6 MMHG
BH CV ECHO MEAS - RAP SYSTOLE: 10 MMHG
BH CV ECHO MEAS - RVSP: 40.9 MMHG
BH CV ECHO MEAS - SI(MOD-SP4): 13.5 ML/M2
BH CV ECHO MEAS - SV(LVOT): 54 ML
BH CV ECHO MEAS - SV(MOD-SP4): 28.4 ML
BH CV ECHO MEAS - TAPSE (>1.6): 2.35 CM
BH CV ECHO MEAS - TR MAX PG: 30.9 MMHG
BH CV ECHO MEAS - TR MAX VEL: 278 CM/SEC
BH CV ECHO MEASUREMENTS AVERAGE E/E' RATIO: 10.3
LEFT ATRIUM VOLUME INDEX: 21.5 ML/M2
MAXIMAL PREDICTED HEART RATE: 151 BPM
STRESS TARGET HR: 128 BPM

## 2023-05-17 ENCOUNTER — OFFICE VISIT (OUTPATIENT)
Dept: PULMONOLOGY | Facility: CLINIC | Age: 68
End: 2023-05-17
Payer: COMMERCIAL

## 2023-05-17 VITALS
DIASTOLIC BLOOD PRESSURE: 70 MMHG | SYSTOLIC BLOOD PRESSURE: 112 MMHG | TEMPERATURE: 98 F | OXYGEN SATURATION: 95 % | WEIGHT: 236 LBS | HEART RATE: 67 BPM | HEIGHT: 62 IN | RESPIRATION RATE: 18 BRPM | BODY MASS INDEX: 43.43 KG/M2

## 2023-05-17 DIAGNOSIS — Z87.891 FORMER SMOKER: ICD-10-CM

## 2023-05-17 DIAGNOSIS — J96.11 CHRONIC RESPIRATORY FAILURE WITH HYPOXIA: ICD-10-CM

## 2023-05-17 DIAGNOSIS — J45.50 SEVERE PERSISTENT ASTHMA WITHOUT COMPLICATION: Primary | ICD-10-CM

## 2023-05-17 RX ORDER — FLUOROMETHOLONE 0.1 %
SUSPENSION, DROPS(FINAL DOSAGE FORM)(ML) OPHTHALMIC (EYE)
COMMUNITY
Start: 2023-03-16

## 2023-05-17 NOTE — PROGRESS NOTES
"Chief Complaint  Bronchitis    Subjective        Josefina Cheng presents to [facility]  History of Present Illness    Patient presents today for follow up of asthma. Continuing to do well with use of the Tezspire injections up until recently. Was notable for notable for pneumonia on imaging in early April, then COVID in later April. Still has oxygen supplies to use if needed, but not required often during the daytime.   Remains compliant with other maintenance therapies.       Objective   Vital Signs:  /70   Pulse 67   Temp 98 °F (36.7 °C) (Temporal)   Resp 18   Ht 157.5 cm (62.01\")   Wt 107 kg (236 lb)   SpO2 95%   BMI 43.15 kg/m²   Estimated body mass index is 43.15 kg/m² as calculated from the following:    Height as of this encounter: 157.5 cm (62.01\").    Weight as of this encounter: 107 kg (236 lb).         Physical Exam  Vitals reviewed.   Constitutional:       General: She is not in acute distress.     Appearance: She is well-developed. She is not diaphoretic.   HENT:      Head: Normocephalic and atraumatic.   Cardiovascular:      Rate and Rhythm: Normal rate and regular rhythm.      Heart sounds: Normal heart sounds, S1 normal and S2 normal.   Pulmonary:      Effort: Pulmonary effort is normal.      Breath sounds: No wheezing, rhonchi or rales.   Neurological:      Mental Status: She is alert and oriented to person, place, and time.   Psychiatric:         Behavior: Behavior normal.        Result Review :  The following data was reviewed by: Diane Vale PA-C on 05/17/2023:    Reviewed previous CT chest imaging/report from April 2022.   Reviewed abdominal CT imaging/report 2023.  Reviewed chest xray (April 2023).    Reviewed previous PFT (2022)  Reviewed recent echo (2023).         Assessment and Plan   Diagnoses and all orders for this visit:    1. Severe persistent asthma without complication (Primary)  -     Tezepelumab-ekko (TEZSPIRE) " injection 210 mg    2. Chronic respiratory failure with hypoxia    3. Former smoker        Moderate-severe asthma:  • Continue albuterol inhaler as needed  • Continue duonebs as needed  • Continue Symbicort as scheduled  • Again did not have much benefit from Spiriva respimat 1.25 mcg, did not continue.   • Continue Tezspire monthly injections - overall still notes significant benefit from use.         Chronic hypoxic respiratory failure:  · Uses 2 L/m as needed basis and at nighttime.    Saturation appropriate on room air today.       Former smoker:  CT chest from April 2022 showed no nodules, minimal scarring appearance.   Recent imaging showed left lower lobe pneumonia.   · Can consider follow up imaging in the next few months to assess for clearance, or otherwise, can resume yearly low dose screening scans.         Follow Up   Return in about 4 months (around 9/17/2023), or if symptoms worsen or fail to improve, for Next scheduled follow up.  Patient was given instructions and counseling regarding her condition or for health maintenance advice. Please see specific information pulled into the AVS if appropriate.

## 2023-05-26 PROBLEM — J45.50 SEVERE PERSISTENT ASTHMA WITHOUT COMPLICATION: Status: ACTIVE | Noted: 2023-05-26

## 2023-06-05 ENCOUNTER — OFFICE VISIT (OUTPATIENT)
Dept: CARDIOLOGY | Facility: CLINIC | Age: 68
End: 2023-06-05
Payer: COMMERCIAL

## 2023-06-05 VITALS
HEART RATE: 67 BPM | HEIGHT: 62 IN | SYSTOLIC BLOOD PRESSURE: 102 MMHG | DIASTOLIC BLOOD PRESSURE: 50 MMHG | OXYGEN SATURATION: 94 % | WEIGHT: 244 LBS | BODY MASS INDEX: 44.9 KG/M2

## 2023-06-05 DIAGNOSIS — I47.20 VT (VENTRICULAR TACHYCARDIA): Primary | ICD-10-CM

## 2023-06-05 PROBLEM — R00.2 PALPITATIONS: Status: RESOLVED | Noted: 2023-03-17 | Resolved: 2023-06-05

## 2023-06-05 NOTE — PROGRESS NOTES
Cardiac Electrophysiology Outpatient Consult Note            [facility]    Consult Note     Josefina Zarate  [id number]  06/05/2023  [phone]     Primary Care Physician: Nela Shaw MD    Referred By: Jay Jones PA-C    Subjective     Chief Complaint:   Diagnoses and all orders for this visit:    1. VT (ventricular tachycardia) (Primary)      Chief Complaint   Patient presents with    Establish Care    Palpitations       History of Present Illness:   Josefina Zarate is a 68 y.o. female who presents to my electrophysiology clinic for evaluation of above complaints.  Ms. zarate is here because of episodes where she feels like she is going to fall asleep.  She stops breathing during the day.  She has to remember to breathe and then she will wake up.    No palpitations no sensation of abnormal heartbeats.  No syncope no presyncope.    She says she was given a CPAP machine more than a decade ago.  She could not wear it.  She was not sure whether she was diagnosed with sleep apnea.    Has significant asthma.  Has a lung specialist in [place] where she lives.  Has not ever seen a sleep medicine doctor as far she is aware however.    No chest pain no syncope no presyncope.    Past Medical History:   Patient Active Problem List    Diagnosis Date Noted    VT (ventricular tachycardia) 06/05/2023    Severe persistent asthma without complication 05/26/2023    Chronic respiratory failure with hypoxia 08/29/2022    Primary hypertension 08/15/2022    Asthmatic bronchitis 06/18/2022    Former smoker 06/18/2022       Past Surgical History:   Past Surgical History:   Procedure Laterality Date    ANKLE SURGERY      BACK SURGERY      CHOLECYSTECTOMY      GASTRIC STIMULATOR IMPLANT SURGERY  2017    changed battery 11/2022    HAND SURGERY      HERNIA REPAIR      HYSTERECTOMY      KNEE SURGERY      TUBAL ABDOMINAL LIGATION         Social History:   Social History      Socioeconomic History    Marital status:    Tobacco Use    Smoking status: Former     Packs/day: 2.00     Years: 28.00     Pack years: 56.00     Types: Cigarettes     Quit date: 2010     Years since quittin.4    Smokeless tobacco: Never   Vaping Use    Vaping Use: Never used   Substance and Sexual Activity    Alcohol use: Never    Drug use: Never    Sexual activity: Not Currently     Partners: Male     Birth control/protection: Condom, Pill, Tubal ligation, Birth control pill, Hysterectomy       Medications:     Current Outpatient Medications:     albuterol sulfate  (90 Base) MCG/ACT inhaler, Inhale 2 puffs Every 4 (Four) Hours As Needed for Wheezing or Shortness of Air., Disp: 18 g, Rfl: 8    ascorbic acid (VITAMIN C) 1000 MG tablet, Take 1 tablet by mouth Daily., Disp: , Rfl:     atorvastatin (LIPITOR) 20 MG tablet, Take 1 tablet by mouth Daily., Disp: , Rfl:     budesonide-formoterol (Symbicort) 160-4.5 MCG/ACT inhaler, Inhale 2 puffs 2 (Two) Times a Day., Disp: 1 each, Rfl: 12    calcium citrate-vitamin d (CALCITRATE) 315-250 MG-UNIT tablet tablet, Take  by mouth Daily., Disp: , Rfl:     cholecalciferol (VITAMIN D3) 1.25 MG (43286 UT) capsule, Vitamin D3  Daily, Disp: , Rfl:     clopidogrel (PLAVIX) 75 MG tablet, Take 1 tablet by mouth Daily., Disp: 30 tablet, Rfl: 11    dapagliflozin Propanediol (Farxiga) 10 MG tablet, Take  by mouth Daily., Disp: , Rfl:     fexofenadine (Allegra Allergy) 180 MG tablet, Take 1 tablet by mouth Daily., Disp: 30 tablet, Rfl: 5    insulin aspart (novoLOG) 100 UNIT/ML injection, Inject  under the skin 3 (Three) Times a Day Before Meals., Disp: , Rfl:     ipratropium-albuterol (DUO-NEB) 0.5-2.5 mg/3 ml nebulizer, Take 3 mL by nebulization 4 (Four) Times a Day As Needed for Wheezing or Shortness of Air., Disp: 360 mL, Rfl: 8    isosorbide mononitrate (IMDUR) 60 MG 24 hr tablet, Take 1 tablet by mouth Daily., Disp: 90 tablet, Rfl: 3    lisinopril  "(PRINIVIL,ZESTRIL) 2.5 MG tablet, Take 1 tablet by mouth Daily. FOR BLOOD PRESSURE, Disp: , Rfl:     metFORMIN ER (GLUCOPHAGE-XR) 500 MG 24 hr tablet, TAKE TWO TABLETS BY MOUTH TWO TIMES A DAY FOR BLOOD SUGAR, Disp: , Rfl:     metoprolol tartrate (LOPRESSOR) 50 MG tablet, Take 1.5 tablets by mouth 2 (Two) Times a Day., Disp: 60 tablet, Rfl: 5    Multiple Vitamins-Minerals (ZINC PO), Take  by mouth., Disp: , Rfl:     nitroglycerin (NITROSTAT) 0.4 MG SL tablet, 1 under the tongue as needed for angina, may repeat q5mins for up three doses, Disp: 25 tablet, Rfl: 2    omeprazole (priLOSEC) 40 MG capsule, , Disp: , Rfl:     ondansetron (ZOFRAN) 4 MG tablet, Every 12 (Twelve) Hours., Disp: , Rfl:     promethazine (PHENERGAN) 25 MG tablet, Take 1 tablet by mouth Every 6 (Six) Hours As Needed for Nausea or Vomiting., Disp: , Rfl:     Tezspire 210 MG/1.91ML injection, INJECT 1 SYRINGE UNDER THE SKIN EVERY 4 WEEKS., Disp: 2 each, Rfl: 4    Toujeo Max SoloStar 300 UNIT/ML solution pen-injector injection, INJECT 54 UNITS UNDER THE SKIN IN THE MORNING AND 54 UNITS IN THE EVENING, Disp: , Rfl:     venlafaxine (EFFEXOR) 75 MG tablet, Take 1 tablet by mouth 2 (Two) Times a Day., Disp: , Rfl:     Allergies:   Allergies   Allergen Reactions    Adhesive Tape Rash    Cephalexin Hives    Cephalosporins Hives    Ciprofloxacin Hcl Hives    Penicillins Hives       Objective   Vital Signs:   Vitals:    06/05/23 1317   BP: 102/50   BP Location: Right arm   Patient Position: Sitting   Pulse: 67   SpO2: 94%   Weight: 111 kg (244 lb)   Height: 157.5 cm (62\")       PHYSICAL EXAM    Neck: no adenopathy, no carotid bruit, no JVD, and thyroid: not enlarged  Lungs: clear to auscultation bilaterally and no rhonchi or crackles\", ' symmetric  Heart: regular rate and rhythm, S1, S2 normal, no murmur, click, rub or gallop  Abdomen: Soft, non-tender, bowel sounds normal,  no organomegaly  Extremities: extremities normal, atraumatic, no cyanosis or " edema      Lab Results   Component Value Date    GLUCOSE 111 (H) 04/20/2023    CALCIUM 9.1 04/20/2023     (L) 04/20/2023    K 5.0 04/20/2023    CO2 28.2 04/20/2023    CL 94 (L) 04/20/2023    BUN 8 04/20/2023    CREATININE 0.68 04/20/2023    EGFRIFNONA 85 09/05/2017    BCR 11.8 04/20/2023    ANIONGAP 12.8 04/20/2023     Lab Results   Component Value Date    WBC 9.65 04/20/2023    HGB 13.5 04/20/2023    HCT 44.3 04/20/2023    MCV 88.6 04/20/2023     04/20/2023     Lab Results   Component Value Date    INR 1.01 08/11/2022    PROTIME 11.8 08/11/2022     No results found for: TSH, G8SNPLP, E3ZVYZF, THYROIDAB    Cardiac Testing:      I personally viewed and interpreted the patient's EKG/Telemetry/lab data    Procedures    Tobacco Cessation: N/A  Obstructive Sleep Apnea Screening: Completed    Assessment & Plan    Diagnoses and all orders for this visit:    1. VT (ventricular tachycardia) (Primary)         Diagnosis Plan   1. VT (ventricular tachycardia)  Nonsustained slow VT.  Monomorphic.  Variable cycle length.    In the context of a structurally normal heart by echocardiography and stress test.  Normal baseline EKG.    Symptoms highly suggestive of sleep apnea.  She states that she was given a CPAP machine many years ago not sure why.  Is not really clear that she was diagnosed with sleep apnea however she does agree with me that if indeed she was prescribed a CPAP machine someone must of diagnosed her with sleep apnea.    Regardless she has every symptom and sign that you would expect to find somebody with sleep apnea such as weight gain headaches somnolence during the day.  See below.    Commend strongly that she discuss with her pulmonary team possibility of needing a sleep medicine specialist and reviewing the data that is already present and then probably repeating definitive sleep medicine testing for occult sleep apnea.    Discussed that occult sleep apnea has a significant risk of sudden death  heart failure stroke myocardial infarction diabetes hypertension obesity and dementia.      EPWORTH SLEEPING ASSESMENT    The patient exhibits the following signs and symptoms of sleep disordered breathing; snoring, daytime sleepiness, observed apneas, choking or gasping during sleep, morning headaches, and irritability/moodiness.    The patient has been experiencing symptoms for 12 year(s).    The patient's sleepiness has been evaluated with an Kiron Sleepiness Scale.                                                        Kiron Sleepiness Scale    Situation Chance of Dozing or Sleeping   Sitting and reading 3 - high chance of dosing or sleeping   Watching TV 3 - high chance of dosing or sleeping   Sitting inactive in a public place 3 - high chance of dosing or sleeping   Being a passenger in a motor vehicle for an hour or more 3 - high chance of dosing or sleeping   Lying down in the afternoon 3 - high chance of dosing or sleeping   Sitting and talking to someone 3 - high chance of dosing or sleeping   Sitting quietly after lunch (no alcohol) 3 - high chance of dosing or sleeping   Stopped for a few minutes in traffic while driving 3 - high chance of dosing or sleeping   Total score (add the scores up) 24       The patient is suspected to have Sleep apnea, unspecified G47.30.           Body mass index is 44.63 kg/m².    I spent 45 minutes in consultation with this patient which included more than 65% of this time in direct face-to-face counseling, physical examination and discussion of my assessment and findings and this shared decision making with the patient.  The remainder of the time not spent face-to-face was performing one, some or all of the following actions: preparing to see the patient (e.g. reviewing tests, prior clinicians' notes, etc), ordering medications, tests or procedures, coordination of care, discussion of the plan with other healthcare providers, documenting clinical information in epic as  well as independently interpreting results and communication of these results to the patient family and/or caregiver(s).  Please note that this explicitly excludes time spent on other separate billable services such as performing procedures or test interpretation, when applicable.    Follow Up:       Thank you for allowing me to participate in the care of your patient. Please to not hesitate to contact me with additional questions or concerns.      Chandler Nix DO, [facility], [facility]  Cardiac Electrophysiologist  [facility]    Electronically signed by MIYA Queen, 06/05/23, 8:30 AM EDT.

## 2023-06-12 ENCOUNTER — OFFICE VISIT (OUTPATIENT)
Dept: CARDIOLOGY | Facility: CLINIC | Age: 68
End: 2023-06-12
Payer: COMMERCIAL

## 2023-06-12 VITALS
OXYGEN SATURATION: 93 % | BODY MASS INDEX: 45.6 KG/M2 | WEIGHT: 247.8 LBS | HEIGHT: 62 IN | RESPIRATION RATE: 18 BRPM | DIASTOLIC BLOOD PRESSURE: 70 MMHG | SYSTOLIC BLOOD PRESSURE: 126 MMHG | HEART RATE: 89 BPM

## 2023-06-12 DIAGNOSIS — I10 PRIMARY HYPERTENSION: ICD-10-CM

## 2023-06-12 DIAGNOSIS — G47.30 SLEEP APNEA, UNSPECIFIED TYPE: Primary | ICD-10-CM

## 2023-06-12 DIAGNOSIS — I47.20 VT (VENTRICULAR TACHYCARDIA): ICD-10-CM

## 2023-06-12 PROCEDURE — 99214 OFFICE O/P EST MOD 30 MIN: CPT | Performed by: PHYSICIAN ASSISTANT

## 2023-06-12 NOTE — PROGRESS NOTES
"Nela Shaw MD  Josefina Cheng  1955 06/12/2023    Patient Active Problem List   Diagnosis    Asthmatic bronchitis    Former smoker    Primary hypertension    Chronic respiratory failure with hypoxia    Severe persistent asthma without complication    VT (ventricular tachycardia)       Dear Nela Shaw MD:    Subjective     History of Present Illness:    Chief Complaint   Patient presents with    Follow-up     3 month    Chest Pain     Not active     Edema     B feet       Josefina Cheng is a pleasant 68 y.o. female with a past medical history significant for ventricular tachycardia, sleep apnea, obesity, diabetes mellitus, tobacco abuse but did quit around 2010, essential hypertension, dyslipidemia.  She comes in today for cardiology follow-up.    Josefina did go to electrophysiology and did see Dr. Nix, who given her normal stress test and echocardiogram did not recommend aggressive therapy for ventricular tachycardia but did recommend aggressive treatment for her underlying sleep apnea which she reports she was diagnosed in the past.  However today she did state that \"I will not wear that thing.\"  She reports in the past she was given a CPAP with a large mask and is unable to tolerate this due to claustrophobia.  She denies chest pains or worsening dyspnea from baseline.  She still has some palpitations.    Allergies   Allergen Reactions    Adhesive Tape Rash    Cephalexin Hives    Cephalosporins Hives    Ciprofloxacin Hcl Hives    Penicillins Hives   :      Current Outpatient Medications:     albuterol sulfate  (90 Base) MCG/ACT inhaler, Inhale 2 puffs Every 4 (Four) Hours As Needed for Wheezing or Shortness of Air., Disp: 18 g, Rfl: 8    ascorbic acid (VITAMIN C) 1000 MG tablet, Take 1 tablet by mouth Daily., Disp: , Rfl:     atorvastatin (LIPITOR) 20 MG tablet, Take 1 tablet by mouth Daily., Disp: , Rfl:     budesonide-formoterol (Symbicort) 160-4.5 MCG/ACT inhaler, Inhale 2 " puffs 2 (Two) Times a Day., Disp: 1 each, Rfl: 12    calcium citrate-vitamin d (CALCITRATE) 315-250 MG-UNIT tablet tablet, Take  by mouth Daily., Disp: , Rfl:     cholecalciferol (VITAMIN D3) 1.25 MG (90601 UT) capsule, Vitamin D3  Daily, Disp: , Rfl:     clopidogrel (PLAVIX) 75 MG tablet, Take 1 tablet by mouth Daily., Disp: 30 tablet, Rfl: 11    dapagliflozin Propanediol (Farxiga) 10 MG tablet, Take  by mouth Daily., Disp: , Rfl:     fexofenadine (Allegra Allergy) 180 MG tablet, Take 1 tablet by mouth Daily., Disp: 30 tablet, Rfl: 5    insulin aspart (novoLOG) 100 UNIT/ML injection, Inject  under the skin 3 (Three) Times a Day Before Meals., Disp: , Rfl:     ipratropium-albuterol (DUO-NEB) 0.5-2.5 mg/3 ml nebulizer, Take 3 mL by nebulization 4 (Four) Times a Day As Needed for Wheezing or Shortness of Air., Disp: 360 mL, Rfl: 8    isosorbide mononitrate (IMDUR) 60 MG 24 hr tablet, Take 1 tablet by mouth Daily., Disp: 90 tablet, Rfl: 3    lisinopril (PRINIVIL,ZESTRIL) 2.5 MG tablet, Take 1 tablet by mouth Daily. FOR BLOOD PRESSURE, Disp: , Rfl:     metFORMIN ER (GLUCOPHAGE-XR) 500 MG 24 hr tablet, TAKE TWO TABLETS BY MOUTH TWO TIMES A DAY FOR BLOOD SUGAR, Disp: , Rfl:     metoprolol tartrate (LOPRESSOR) 50 MG tablet, Take 1.5 tablets by mouth 2 (Two) Times a Day., Disp: 60 tablet, Rfl: 5    Multiple Vitamins-Minerals (ZINC PO), Take  by mouth., Disp: , Rfl:     nitroglycerin (NITROSTAT) 0.4 MG SL tablet, 1 under the tongue as needed for angina, may repeat q5mins for up three doses, Disp: 25 tablet, Rfl: 2    omeprazole (priLOSEC) 40 MG capsule, , Disp: , Rfl:     ondansetron (ZOFRAN) 4 MG tablet, Every 12 (Twelve) Hours., Disp: , Rfl:     promethazine (PHENERGAN) 25 MG tablet, Take 1 tablet by mouth Every 6 (Six) Hours As Needed for Nausea or Vomiting., Disp: , Rfl:     Tezspire 210 MG/1.91ML injection, INJECT 1 SYRINGE UNDER THE SKIN EVERY 4 WEEKS., Disp: 2 each, Rfl: 4    Toujeo Max SoloStar 300 UNIT/ML solution  "pen-injector injection, INJECT 54 UNITS UNDER THE SKIN IN THE MORNING AND 54 UNITS IN THE EVENING, Disp: , Rfl:     venlafaxine (EFFEXOR) 75 MG tablet, Take 1 tablet by mouth 2 (Two) Times a Day., Disp: , Rfl:     The following portions of the patient's history were reviewed and updated as appropriate: allergies, current medications, past family history, past medical history, past social history, past surgical history and problem list.    Social History     Tobacco Use    Smoking status: Former     Packs/day: 2.00     Years: 28.00     Pack years: 56.00     Types: Cigarettes     Quit date: 2010     Years since quittin.4    Smokeless tobacco: Never   Vaping Use    Vaping Use: Never used   Substance Use Topics    Alcohol use: Never    Drug use: Never         Objective   Vitals:    23 1058   BP: 126/70   BP Location: Right arm   Patient Position: Sitting   Cuff Size: Adult   Pulse: 89   Resp: 18   SpO2: 93%   Weight: 112 kg (247 lb 12.8 oz)   Height: 157.5 cm (62\")     Body mass index is 45.32 kg/m².    Constitutional:       General: Not in acute distress.     Appearance: Healthy appearance. Well-developed and not in distress. Not diaphoretic.   Eyes:      Conjunctiva/sclera: Conjunctivae normal.      Pupils: Pupils are equal, round, and reactive to light.   HENT:      Head: Normocephalic and atraumatic.   Neck:      Vascular: No carotid bruit or JVD.   Pulmonary:      Effort: Pulmonary effort is normal. No respiratory distress.      Breath sounds: Normal breath sounds.   Cardiovascular:      Normal rate. Regular rhythm.   Edema:     Peripheral edema absent.   Skin:     General: Skin is cool.   Neurological:      Mental Status: Alert, oriented to person, place, and time and oriented to person, place and time.       Lab Results   Component Value Date     (L) 2023    K 5.0 2023    CL 94 (L) 2023    CO2 28.2 2023    BUN 8 2023    CREATININE 0.68 2023    GLUCOSE 111 " (H) 04/20/2023    CALCIUM 9.1 04/20/2023    AST 14 04/20/2023    ALT 8 04/20/2023    ALKPHOS 105 04/20/2023     No results found for: CKTOTAL  Lab Results   Component Value Date    WBC 9.65 04/20/2023    HGB 13.5 04/20/2023    HCT 44.3 04/20/2023     04/20/2023     Lab Results   Component Value Date    INR 1.01 08/11/2022     No results found for: MG  No results found for: TSH, PSA, CHLPL, TRIG, HDL, LDL   No results found for: BNP    During this visit the following were done:  Labs Reviewed []    Labs Ordered []    Radiology Reports Reviewed []    Radiology Ordered []    PCP Records Reviewed []    Referring Provider Records Reviewed []    ER Records Reviewed []    Hospital Records Reviewed []    History Obtained From Family []    Radiology Images Reviewed []    Other Reviewed []    Records Requested []       Procedures    Assessment & Plan    Diagnosis Plan   1. Sleep apnea, unspecified type  Home Sleep Study      2. Primary hypertension        3. VT (ventricular tachycardia)                 Recommendations:  Obstructive sleep apnea  I will order home sleep study to evaluate severity of sleep apnea  Strongly encouraged her to be open to consider a CPAP I discussed potential complications of untreated sleep apnea including life-threatening dysrhythmia such as sudden death, heart failure, MI, CVA, diabetes, essential hypertension, and dementia.  Palpitations  I will increase metoprolol to 75 mg she has only been taking 50 mg.    Return in about 3 months (around 9/12/2023).    As always, I appreciate very much the opportunity to participate in the cardiovascular care of your patients.      With Best Regards,    Jay Jones PA-C

## 2023-06-13 DIAGNOSIS — M79.642 LEFT HAND PAIN: Primary | ICD-10-CM

## 2023-06-14 ENCOUNTER — CLINICAL SUPPORT (OUTPATIENT)
Dept: PULMONOLOGY | Facility: CLINIC | Age: 68
End: 2023-06-14
Payer: COMMERCIAL

## 2023-06-14 DIAGNOSIS — J45.50 SEVERE PERSISTENT ASTHMA WITHOUT COMPLICATION: Primary | ICD-10-CM

## 2023-07-11 ENCOUNTER — OFFICE VISIT (OUTPATIENT)
Dept: ORTHOPEDIC SURGERY | Facility: CLINIC | Age: 68
End: 2023-07-11
Payer: COMMERCIAL

## 2023-07-11 VITALS — WEIGHT: 247 LBS | BODY MASS INDEX: 45.45 KG/M2 | HEIGHT: 62 IN

## 2023-07-11 DIAGNOSIS — M65.312 TRIGGER FINGER OF LEFT THUMB: Primary | ICD-10-CM

## 2023-07-11 DIAGNOSIS — M79.642 LEFT HAND PAIN: ICD-10-CM

## 2023-07-11 PROCEDURE — 99213 OFFICE O/P EST LOW 20 MIN: CPT | Performed by: PHYSICIAN ASSISTANT

## 2023-07-11 NOTE — PROGRESS NOTES
[facility] Established Patient Visit          Patient: Josefina Cheng  YOB: 1955  Date of Encounter: 2023  PCP: Nela Shaw MD      Subjective     Chief Complaint   Patient presents with    Left Thumb - Pain           History of Present Illness:     Josefina Cheng is a 68 y.o. female presents today as result of left thumb pain and locking for the last 2 months.  The patient has undergone conservative mobilization as well as NSAIDs and avoidance of aggravating factors with some alleviation of pain and symptoms.  She states that this is now tolerable and does note improvement.  She reports no other exacerbation or worsening of thumb pain.  Denies any other new complaints.  She reports no significant catching or triggering.        Patient Active Problem List   Diagnosis    Asthmatic bronchitis    Former smoker    Primary hypertension    Chronic respiratory failure with hypoxia    Severe persistent asthma without complication    VT (ventricular tachycardia)     Past Medical History:   Diagnosis Date    Asthma     CHF (congestive heart failure)     Deep vein thrombosis     Diabetes mellitus     Diverticulitis     Hyperlipidemia     Hypertension      Past Surgical History:   Procedure Laterality Date    ANKLE SURGERY      BACK SURGERY      CHOLECYSTECTOMY      FOOT SURGERY Left     GASTRIC STIMULATOR IMPLANT SURGERY  2017    changed battery 2022    HAND SURGERY      HERNIA REPAIR      HYSTERECTOMY      KNEE SURGERY Left     TUBAL ABDOMINAL LIGATION       Social History     Occupational History    Not on file   Tobacco Use    Smoking status: Former     Packs/day: 2.00     Years: 28.00     Pack years: 56.00     Types: Cigarettes     Quit date: 2010     Years since quittin.5    Smokeless tobacco: Never   Vaping Use    Vaping Use: Never used   Substance and Sexual Activity    Alcohol use: Never    Drug use: Never    Sexual activity: Not Currently      Partners: Male     Birth control/protection: Condom, Pill, Tubal ligation, Birth control pill, Hysterectomy    Josefina Cheng  reports that she quit smoking about 13 years ago. Her smoking use included cigarettes. She has a 56.00 pack-year smoking history. She has never used smokeless tobacco.. I have educated her on the risk of diseases from using tobacco products such as cancer, COPD, and heart disease.          Social History     Social History Narrative    Not on file     Family History   Problem Relation Age of Onset    Osteoporosis Mother     Clotting disorder Mother     Cancer Father     Cancer Sister     Anemia Sister     Fainting Sister     Hypertension Sister     Asthma Sister     Asthma Brother     Asthma Maternal Grandfather     Heart attack Paternal Grandmother     Heart disease Paternal Grandmother      Current Outpatient Medications   Medication Sig Dispense Refill    albuterol sulfate  (90 Base) MCG/ACT inhaler Inhale 2 puffs Every 4 (Four) Hours As Needed for Wheezing or Shortness of Air. 18 g 8    ascorbic acid (VITAMIN C) 1000 MG tablet Take 1 tablet by mouth Daily.      atorvastatin (LIPITOR) 20 MG tablet Take 1 tablet by mouth Daily.      budesonide-formoterol (Symbicort) 160-4.5 MCG/ACT inhaler Inhale 2 puffs 2 (Two) Times a Day. 1 each 12    calcium citrate-vitamin d (CALCITRATE) 315-250 MG-UNIT tablet tablet Take  by mouth Daily.      cholecalciferol (VITAMIN D3) 1.25 MG (75575 UT) capsule       clopidogrel (PLAVIX) 75 MG tablet Take 1 tablet by mouth Daily. 30 tablet 11    dapagliflozin Propanediol (Farxiga) 10 MG tablet Take  by mouth Daily.      fexofenadine (Allegra Allergy) 180 MG tablet Take 1 tablet by mouth Daily. 30 tablet 5    insulin aspart (novoLOG) 100 UNIT/ML injection Inject  under the skin 3 (Three) Times a Day Before Meals.      ipratropium-albuterol (DUO-NEB) 0.5-2.5 mg/3 ml nebulizer Take 3 mL by nebulization 4 (Four) Times a Day As Needed for Wheezing or Shortness  of Air. 360 mL 8    isosorbide mononitrate (IMDUR) 60 MG 24 hr tablet Take 1 tablet by mouth Daily. 90 tablet 3    lisinopril (PRINIVIL,ZESTRIL) 2.5 MG tablet Take 1 tablet by mouth Daily. FOR BLOOD PRESSURE      metFORMIN ER (GLUCOPHAGE-XR) 500 MG 24 hr tablet TAKE TWO TABLETS BY MOUTH TWO TIMES A DAY FOR BLOOD SUGAR      metoprolol tartrate (LOPRESSOR) 50 MG tablet Take 1.5 tablets by mouth 2 (Two) Times a Day. 60 tablet 5    Multiple Vitamins-Minerals (ZINC PO) Take  by mouth.      nitroglycerin (NITROSTAT) 0.4 MG SL tablet 1 under the tongue as needed for angina, may repeat q5mins for up three doses 25 tablet 2    omeprazole (priLOSEC) 40 MG capsule       ondansetron (ZOFRAN) 4 MG tablet Every 12 (Twelve) Hours.      promethazine (PHENERGAN) 25 MG tablet Take 1 tablet by mouth Every 6 (Six) Hours As Needed for Nausea or Vomiting.      Tezspire 210 MG/1.91ML injection INJECT 1 SYRINGE UNDER THE SKIN EVERY 4 WEEKS. 2 each 4    Toujeo Max SoloStar 300 UNIT/ML solution pen-injector injection INJECT 54 UNITS UNDER THE SKIN IN THE MORNING AND 54 UNITS IN THE EVENING      venlafaxine (EFFEXOR) 75 MG tablet Take 1 tablet by mouth 2 (Two) Times a Day.       No current facility-administered medications for this visit.     Allergies   Allergen Reactions    Adhesive Tape Rash    Cephalexin Hives    Cephalosporins Hives    Ciprofloxacin Hcl Hives    Penicillins Hives            Review of Systems   HENT: Negative.          Sinus pain    Eyes: Negative.    Cardiovascular:  Positive for leg swelling.   Respiratory:  Positive for cough, shortness of breath and wheezing.    Endocrine: Negative.    Hematologic/Lymphatic: Bruises/bleeds easily.   Skin: Negative.    Musculoskeletal:  Positive for back pain, joint pain, joint swelling and neck pain.        Pertinent positives listed in HPI   Gastrointestinal:  Positive for nausea.   Genitourinary: Negative.    Neurological:  Positive for weakness.   Psychiatric/Behavioral:  The  "patient is nervous/anxious.    Allergic/Immunologic: Negative.        Objective      Vitals:    07/11/23 1006   Weight: 112 kg (247 lb)   Height: 157.5 cm (62.01\")      Class 3 Severe Obesity (BMI >=40). Obesity-related health conditions include the following:  listed in PMH . Obesity is newly identified. BMI is is above average; BMI management plan is completed. We discussed portion control and increasing exercise.      Physical Exam  Vitals and nursing note reviewed.   Constitutional:       General: She is not in acute distress.     Appearance: She is not ill-appearing.   HENT:      Head: Normocephalic and atraumatic.      Right Ear: External ear normal.      Left Ear: External ear normal.      Nose: Nose normal. No congestion or rhinorrhea.   Eyes:      Extraocular Movements: Extraocular movements intact.      Conjunctiva/sclera: Conjunctivae normal.      Pupils: Pupils are equal, round, and reactive to light.   Cardiovascular:      Rate and Rhythm: Normal rate.      Pulses: Normal pulses.   Pulmonary:      Effort: Pulmonary effort is normal. No respiratory distress.      Breath sounds: No stridor.   Abdominal:      General: There is no distension.   Musculoskeletal:      Left hand: Tenderness present. No swelling or bony tenderness. Decreased range of motion (painful however non triggering A1 pulley). Normal strength. Normal sensation. There is no disruption of two-point discrimination. Normal capillary refill. Normal pulse.      Cervical back: Normal range of motion.   Skin:     General: Skin is warm and dry.      Capillary Refill: Capillary refill takes less than 2 seconds.   Neurological:      General: No focal deficit present.      Mental Status: She is alert and oriented to person, place, and time.   Psychiatric:         Mood and Affect: Mood normal.         Behavior: Behavior normal.         Thought Content: Thought content normal.         Judgment: Judgment normal.           Radiology:      No radiology " results for the last 30 days.          Assessment/Plan          ICD-10-CM ICD-9-CM   1. Trigger finger of left thumb  M65.312 727.03   2. Left hand pain  M79.642 729.5         68-year-old female with notable left first trigger thumb/flexor tenosynovitis.  Patient has implemented NSAIDs as well as a thumb spica bracing and has resolution and alleviation with the previous symptoms.  This is not 100% alleviated and discussion was had with the patient in reference to continuation or exacerbation/return of symptoms we discussed proceeding with A1 pulley injection versus discussion of surgical intervention.  She is agreeable to current treatment plan of care.  The patient will follow-up on an as-needed basis.                  This document was signed by Roberto Salcido PA-C July 11, 2023    CC: Nela Shaw MD      Dictated Utilizing Dragon Dictation:   Please note that portions of this note were completed with a voice recognition program.   Part of this note may be an electronic transcription/translation of spoken language to printed text using the Dragon Dictation System.

## 2023-08-10 ENCOUNTER — CLINICAL SUPPORT (OUTPATIENT)
Dept: PULMONOLOGY | Facility: CLINIC | Age: 68
End: 2023-08-10
Payer: COMMERCIAL

## 2023-08-10 DIAGNOSIS — J45.50 SEVERE PERSISTENT ASTHMA WITHOUT COMPLICATION: Primary | ICD-10-CM

## 2023-08-10 NOTE — PROGRESS NOTES
Patient denies being born premature, childhood infections, or childhood allergies. Started smoking at the age of **.Smoked ** PPD for ** years. Quit smoking ** years ago. Denies family history of lung disease or lung cancer. Denies shortness of breath, coughing, wheezing, snoring, acid reflux, hemoptysis, abnormal weight loss, or night sweats. Can walk approximately ** ft before getting short of breath. Currently unemployed, formerly worked as **. Denies pets or pet allergies. Denies inhaler use.

## 2023-09-07 ENCOUNTER — CLINICAL SUPPORT (OUTPATIENT)
Dept: PULMONOLOGY | Facility: CLINIC | Age: 68
End: 2023-09-07
Payer: COMMERCIAL

## 2023-09-07 DIAGNOSIS — Z87.891 FORMER SMOKER: ICD-10-CM

## 2023-09-07 DIAGNOSIS — R91.8 PULMONARY INFILTRATES: Primary | ICD-10-CM

## 2023-09-07 DIAGNOSIS — J45.50 SEVERE PERSISTENT ASTHMA WITHOUT COMPLICATION: Primary | ICD-10-CM

## 2023-09-07 DIAGNOSIS — J96.11 CHRONIC RESPIRATORY FAILURE WITH HYPOXIA: ICD-10-CM

## 2023-09-11 NOTE — PROGRESS NOTES
I had returned the patient's call on 9/7.   She had asked if we were going to repeat a CT Chest, as we had previously discussed. I had planned to get a repeat CT chest between 3-6 months for follow up of the left lower opacity noted on the April CT chest imaging.   Next appointment was scheduled for 9/19, but ordered during our call instead.     Patient also requested smaller option for oxygen (was noted low in PCP office, but did not have her supplies with her. Has trouble ambulating with the larger tanks. Did not receive any smaller option as previously requested).   Placed new order for small tanks or POC device.

## 2023-09-12 ENCOUNTER — OFFICE VISIT (OUTPATIENT)
Dept: CARDIOLOGY | Facility: CLINIC | Age: 68
End: 2023-09-12
Payer: COMMERCIAL

## 2023-09-12 VITALS
SYSTOLIC BLOOD PRESSURE: 145 MMHG | BODY MASS INDEX: 45.16 KG/M2 | WEIGHT: 245.4 LBS | HEART RATE: 62 BPM | OXYGEN SATURATION: 91 % | HEIGHT: 62 IN | DIASTOLIC BLOOD PRESSURE: 74 MMHG

## 2023-09-12 DIAGNOSIS — E78.5 DYSLIPIDEMIA: ICD-10-CM

## 2023-09-12 DIAGNOSIS — R00.2 PALPITATIONS: ICD-10-CM

## 2023-09-12 DIAGNOSIS — I47.20 VT (VENTRICULAR TACHYCARDIA): Primary | ICD-10-CM

## 2023-09-12 PROCEDURE — 99214 OFFICE O/P EST MOD 30 MIN: CPT | Performed by: NURSE PRACTITIONER

## 2023-09-12 RX ORDER — METOPROLOL TARTRATE 50 MG/1
75 TABLET, FILM COATED ORAL 2 TIMES DAILY
Qty: 60 TABLET | Refills: 5 | Status: SHIPPED | OUTPATIENT
Start: 2023-09-12

## 2023-09-12 RX ORDER — DIPHENHYDRAMINE HCL 25 MG
25 CAPSULE ORAL EVERY 6 HOURS PRN
COMMUNITY

## 2023-09-12 RX ORDER — ISOSORBIDE MONONITRATE 60 MG/1
60 TABLET, EXTENDED RELEASE ORAL DAILY
Qty: 90 TABLET | Refills: 3 | Status: SHIPPED | OUTPATIENT
Start: 2023-09-12

## 2023-09-12 NOTE — PROGRESS NOTES
[facility]             AlkaTIMA Vega Gina Leanne, MD  Josefina Cheng  1955 09/12/2023    Patient Active Problem List   Diagnosis    Asthmatic bronchitis    Former smoker    Primary hypertension    Chronic respiratory failure with hypoxia    Severe persistent asthma without complication    VT (ventricular tachycardia)    Dyslipidemia       Dear Nela Shaw MD:    Subjective     Chief Complaint   Patient presents with    Follow-up     HSS DENIED       HPI:     This is a 68 y.o. female with known past medical history of ventricular tachycardia, sleep apnea, obesity, diabetes mellitus, tobacco abuse but did quit around 2010, essential hypertension, dyslipidemia.     Josefina Cheng presents today for routine cardiology follow up.  Patient states she has been doing overall well since her last visit.  Denies any chest pain.  Has intermittent palpitations.  Home sleep study was denied by insurance therefore she did not have it done.  Blood pressure mildly elevated in the office today but states at home it is well controlled.  Reports recent labs by PCP which are available for review today.    Diagnostic Testing  Cardiac event monitor 1/2023: Nonsustained ventricular tachycardia  Echocardiogram 4/2023: EF 61 to 65% with mild to moderate mitral valve regurgitation and mild tricuspid valve regurgitation  Nuclear stress test 4/2023: No evidence of ischemia     All other systems were reviewed and were negative.    Patient Active Problem List   Diagnosis    Asthmatic bronchitis    Former smoker    Primary hypertension    Chronic respiratory failure with hypoxia    Severe persistent asthma without complication    VT (ventricular tachycardia)    Dyslipidemia       family history includes Anemia in her sister; Asthma in her brother, maternal grandfather, and sister; Cancer in her father and sister; Clotting disorder in her mother; Fainting in her  sister; Heart attack in her paternal grandmother; Heart disease in her paternal grandmother; Hypertension in her sister; Osteoporosis in her mother.     reports that she quit smoking about 13 years ago. Her smoking use included cigarettes. She has a 56.00 pack-year smoking history. She has never used smokeless tobacco. She reports that she does not drink alcohol and does not use drugs.    Allergies   Allergen Reactions    Adhesive Tape Rash    Cephalexin Hives    Cephalosporins Hives    Ciprofloxacin Hcl Hives    Penicillins Hives         Current Outpatient Medications:     albuterol sulfate  (90 Base) MCG/ACT inhaler, Inhale 2 puffs Every 4 (Four) Hours As Needed for Wheezing or Shortness of Air., Disp: 18 g, Rfl: 8    ascorbic acid (VITAMIN C) 1000 MG tablet, Take 1 tablet by mouth Daily., Disp: , Rfl:     atorvastatin (LIPITOR) 20 MG tablet, Take 1 tablet by mouth Daily., Disp: , Rfl:     budesonide-formoterol (Symbicort) 160-4.5 MCG/ACT inhaler, Inhale 2 puffs 2 (Two) Times a Day., Disp: 1 each, Rfl: 12    calcium citrate-vitamin d (CALCITRATE) 315-250 MG-UNIT tablet tablet, Take  by mouth Daily., Disp: , Rfl:     cholecalciferol (VITAMIN D3) 1.25 MG (83724 UT) capsule, , Disp: , Rfl:     clopidogrel (PLAVIX) 75 MG tablet, Take 1 tablet by mouth Daily., Disp: 30 tablet, Rfl: 11    dapagliflozin Propanediol (Farxiga) 10 MG tablet, Take  by mouth Daily., Disp: , Rfl:     diphenhydrAMINE (BENADRYL) 25 mg capsule, Take 1 capsule by mouth Every 6 (Six) Hours As Needed for Itching., Disp: , Rfl:     fexofenadine (Allegra Allergy) 180 MG tablet, Take 1 tablet by mouth Daily., Disp: 30 tablet, Rfl: 5    insulin aspart (novoLOG) 100 UNIT/ML injection, Inject  under the skin 3 (Three) Times a Day Before Meals., Disp: , Rfl:     ipratropium-albuterol (DUO-NEB) 0.5-2.5 mg/3 ml nebulizer, Take 3 mL by nebulization 4 (Four) Times a Day As Needed for Wheezing or Shortness of Air., Disp: 360 mL, Rfl: 8    isosorbide  mononitrate (IMDUR) 60 MG 24 hr tablet, Take 1 tablet by mouth Daily., Disp: 90 tablet, Rfl: 3    lisinopril (PRINIVIL,ZESTRIL) 2.5 MG tablet, Take 1 tablet by mouth Daily. FOR BLOOD PRESSURE, Disp: , Rfl:     metFORMIN ER (GLUCOPHAGE-XR) 500 MG 24 hr tablet, TAKE TWO TABLETS BY MOUTH TWO TIMES A DAY FOR BLOOD SUGAR, Disp: , Rfl:     metoprolol tartrate (LOPRESSOR) 50 MG tablet, Take 1.5 tablets by mouth 2 (Two) Times a Day., Disp: 60 tablet, Rfl: 5    Multiple Vitamins-Minerals (ZINC PO), Take  by mouth., Disp: , Rfl:     nitroglycerin (NITROSTAT) 0.4 MG SL tablet, 1 under the tongue as needed for angina, may repeat q5mins for up three doses, Disp: 25 tablet, Rfl: 2    omeprazole (priLOSEC) 40 MG capsule, , Disp: , Rfl:     ondansetron (ZOFRAN) 4 MG tablet, Every 12 (Twelve) Hours., Disp: , Rfl:     promethazine (PHENERGAN) 25 MG tablet, Take 1 tablet by mouth Every 6 (Six) Hours As Needed for Nausea or Vomiting., Disp: , Rfl:     Tezspire 210 MG/1.91ML injection, INJECT 1 SYRINGE UNDER THE SKIN EVERY 4 WEEKS., Disp: 2 each, Rfl: 4    Toujeo Max SoloStar 300 UNIT/ML solution pen-injector injection, INJECT 54 UNITS UNDER THE SKIN IN THE MORNING AND 54 UNITS IN THE EVENING, Disp: , Rfl:     venlafaxine (EFFEXOR) 75 MG tablet, Take 1 tablet by mouth 2 (Two) Times a Day., Disp: , Rfl:       Physical Exam:  I have reviewed the patient's current vital signs as documented in the patient's EMR.   Vitals:    09/12/23 1003   BP: 145/74   Pulse: 62   SpO2: 91%     Body mass index is 44.88 kg/m².       09/12/23  1003   Weight: 111 kg (245 lb 6.4 oz)      Physical Exam  Constitutional:       General: She is not in acute distress.     Appearance: Normal appearance. She is well-developed and normal weight.   HENT:      Head: Normocephalic and atraumatic.   Eyes:      General: Lids are normal.      Conjunctiva/sclera: Conjunctivae normal.      Pupils: Pupils are equal, round, and reactive to light.   Neck:      Vascular: No  carotid bruit or JVD.   Cardiovascular:      Rate and Rhythm: Normal rate and regular rhythm.      Heart sounds: Normal heart sounds, S1 normal and S2 normal. No murmur heard.  Pulmonary:      Effort: Pulmonary effort is normal. No respiratory distress.      Breath sounds: Normal breath sounds. No wheezing.   Abdominal:      General: Bowel sounds are normal. There is no distension.      Palpations: Abdomen is soft. There is no hepatomegaly or splenomegaly.      Tenderness: There is no abdominal tenderness.   Musculoskeletal:         General: No swelling. Normal range of motion.      Cervical back: Normal range of motion and neck supple.      Right lower leg: No edema.      Left lower leg: No edema.   Skin:     General: Skin is warm and dry.      Coloration: Skin is not jaundiced.      Findings: No rash.   Neurological:      General: No focal deficit present.      Mental Status: She is alert and oriented to person, place, and time. Mental status is at baseline.   Psychiatric:         Mood and Affect: Mood normal.         Speech: Speech normal.         Behavior: Behavior normal.         Thought Content: Thought content normal.         Judgment: Judgment normal.          DATA REVIEWED:     TTE/ROCKY:  Results for orders placed during the hospital encounter of 04/18/23    Adult Transthoracic Echo Complete W/ Cont if Necessary Per Protocol    Interpretation Summary    Normal left ventricular cavity size and wall thickness noted. All left ventricular wall segments contract normally.    Left ventricular ejection fraction appears to be 61 - 65%.    Left ventricular diastolic function is consistent with (grade I) impaired relaxation.    The aortic valve is structurally normal with no regurgitation or stenosis present.    The mitral valve is structurally normal with no significant stenosis present. Mild to moderate mitral valve regurgitation is present.    Mild tricuspid valve regurgitation is present. Estimated right  ventricular systolic pressure from tricuspid regurgitation is mildly elevated (35-45 mmHg).    There is no evidence of pericardial effusion. .      Laboratory evaluations:    Lab Results   Component Value Date    GLUCOSE 111 (H) 04/20/2023    BUN 8 04/20/2023    CREATININE 0.68 04/20/2023    EGFRIFNONA 85 09/05/2017    BCR 11.8 04/20/2023    K 5.0 04/20/2023    CO2 28.2 04/20/2023    CALCIUM 9.1 04/20/2023    ALBUMIN 3.9 04/20/2023    AST 14 04/20/2023    ALT 8 04/20/2023     Lab Results   Component Value Date    WBC 9.65 04/20/2023    HGB 13.5 04/20/2023    HCT 44.3 04/20/2023    MCV 88.6 04/20/2023     04/20/2023     No results found for: CHOL, CHLPL, TRIG, HDL, LDL, LDLDIRECT  No results found for: TSH, M9CGEOP, E9YMQPM, THYROIDAB  No results found for: HGBA1C  Lab Results   Component Value Date    ALT 8 04/20/2023     No results found for: HGBA1C  Lab Results   Component Value Date    CREATININE 0.68 04/20/2023     Lab Results   Component Value Date    FERRITIN 69.96 04/20/2023     Lab Results   Component Value Date    INR 1.01 08/11/2022    PROTIME 11.8 08/11/2022           --------------------------------------------------------------------------------------------------------------------------    ASSESSMENT/PLAN:      Diagnosis Plan   1. VT (ventricular tachycardia)        2. Palpitations  metoprolol tartrate (LOPRESSOR) 50 MG tablet    isosorbide mononitrate (IMDUR) 60 MG 24 hr tablet      3. Dyslipidemia            Palpitations  Ventricular tachycardia  Patient reports palpitations are infrequent.  She was unable to have home sleep study as it was denied by insurance.  It is felt that patient's arrhythmias and palpitations are secondary to severe sleep apnea however she states she is not interested in wearing CPAP even if she has sleep apnea.  Continue metoprolol at current dosing.    Dyslipidemia  Labs requested from PCP.  Adjust statin as needed.      This document has been @Electronically signed by  TIMA Vazquez, 09/12/23, 9:49 AM EDT.       Dictated Utilizing Dragon Dictation: Part of this note may be an electronic transcription/translation of spoken language to printed text using the Dragon Dictation System.    Follow-up appointment and medication changes provided in hand delivered After Visit Summary as well as reviewed in the room.

## 2023-09-13 ENCOUNTER — TELEPHONE (OUTPATIENT)
Dept: CARDIOLOGY | Facility: CLINIC | Age: 68
End: 2023-09-13
Payer: COMMERCIAL

## 2023-09-13 NOTE — TELEPHONE ENCOUNTER
----- Message from TIMA Villatoro sent at 9/12/2023 10:12 AM EDT -----  Please request labs from PCP   no anemia, no easy bruising, no jaundice, no swollen lymph nodes.  + RLE swelling

## 2023-09-18 ENCOUNTER — TRANSCRIBE ORDERS (OUTPATIENT)
Dept: ADMINISTRATIVE | Facility: HOSPITAL | Age: 68
End: 2023-09-18
Payer: COMMERCIAL

## 2023-09-18 DIAGNOSIS — I73.9 PERIPHERAL VASCULAR DISEASE, UNSPECIFIED: ICD-10-CM

## 2023-09-18 DIAGNOSIS — R09.89 WEAK CAROTID PULSE: Primary | ICD-10-CM

## 2023-09-20 ENCOUNTER — HOSPITAL ENCOUNTER (OUTPATIENT)
Dept: CT IMAGING | Facility: HOSPITAL | Age: 68
Discharge: HOME | End: 2023-09-20
Admitting: PHYSICIAN ASSISTANT
Payer: MEDICARE

## 2023-09-20 DIAGNOSIS — R91.8 PULMONARY INFILTRATES: ICD-10-CM

## 2023-09-20 DIAGNOSIS — Z87.891 FORMER SMOKER: ICD-10-CM

## 2023-09-20 PROCEDURE — 71250 CT THORAX DX C-: CPT

## 2023-09-27 ENCOUNTER — OFFICE VISIT (OUTPATIENT)
Dept: PULMONOLOGY | Facility: CLINIC | Age: 68
End: 2023-09-27
Payer: COMMERCIAL

## 2023-09-27 VITALS
WEIGHT: 238 LBS | TEMPERATURE: 97.9 F | OXYGEN SATURATION: 97 % | HEART RATE: 81 BPM | SYSTOLIC BLOOD PRESSURE: 128 MMHG | BODY MASS INDEX: 43.79 KG/M2 | HEIGHT: 62 IN | DIASTOLIC BLOOD PRESSURE: 72 MMHG

## 2023-09-27 DIAGNOSIS — J45.50 SEVERE PERSISTENT ASTHMA WITHOUT COMPLICATION: Primary | ICD-10-CM

## 2023-09-27 DIAGNOSIS — Z87.891 FORMER SMOKER: ICD-10-CM

## 2023-09-27 DIAGNOSIS — J96.11 CHRONIC RESPIRATORY FAILURE WITH HYPOXIA: ICD-10-CM

## 2023-09-27 PROCEDURE — 99214 OFFICE O/P EST MOD 30 MIN: CPT | Performed by: PHYSICIAN ASSISTANT

## 2023-09-27 NOTE — PROGRESS NOTES
"Chief Complaint  Severe persistent asthma without complication and Shortness of Breath (On exertion)    Subjective        Josefina Cheng presents to [facility]  History of Present Illness    Mrs. Cheng presents today for follow up of severe asthma, chronic hypoxic respiratory failure, and former smoking history.   Still notes benefit of management from Tezspire overall (improved symptoms when she first receives the shot, then increase of baseline symptoms towards the time the next one is due. Still using Symbicort for additional maintenance. Tolerating room air today, but still intermittently needs supplemental oxygen support due to severity of symptoms.   Estimates smoking cessation around 10-12 years ago.       Objective   Vital Signs:  /72 (BP Location: Left arm, Patient Position: Sitting)   Pulse 81   Temp 97.9 °F (36.6 °C)   Ht 157.5 cm (62\")   Wt 108 kg (238 lb)   SpO2 97%   BMI 43.53 kg/m²   Estimated body mass index is 43.53 kg/m² as calculated from the following:    Height as of this encounter: 157.5 cm (62\").    Weight as of this encounter: 108 kg (238 lb).          Physical Exam  Vitals reviewed.   Constitutional:       General: She is not in acute distress.     Appearance: She is well-developed. She is not diaphoretic.   HENT:      Head: Normocephalic and atraumatic.   Cardiovascular:      Rate and Rhythm: Normal rate and regular rhythm.   Pulmonary:      Effort: Pulmonary effort is normal.      Breath sounds: No wheezing, rhonchi or rales.   Neurological:      Mental Status: She is alert and oriented to person, place, and time.   Psychiatric:         Behavior: Behavior normal.          Result Review :  The following data was reviewed by: Diane Vale PA-C on 09/27/2023:      Reviewed CT chest imaging/report from September 2023.   - near complete resolution noted of the LLL opacity (noted on CT abdomen)    Reviewed CT abdomen " imaging/report from April 2023.   - large left lower opacity noted, not present on CT chest from 2022.     Reviewed PFT from 2022.       Assessment and Plan   Diagnoses and all orders for this visit:    1. Severe persistent asthma without complication (Primary)    2. Chronic respiratory failure with hypoxia    3. Former smoker      Moderate-severe asthma:   Continue albuterol inhaler as needed  Continue duonebs as needed  Continue symbicort as scheduled  Continue Tezspire monthly injections    Not much benefit noted form Spiriva respimat 1.25 mcg addition.         Chroinc hypoxic respiratory failure;   Uses 2 L/m as needed and at nighttime.   Order placed for POC device as this may be more beneficial during travel.       Former smoker:   Follow-up CT chest recently completed. Showed resolution of the left lower lobe pneumonia. Minimal scarring/atelectasis noted.   Qualifies still for yearly LDCT until > 15 years cessation (believes its been between 10-12 years since cessation). With 28 year, 2 ppd average history of use (56 pack year noted).   Will consider LDCT in 1 year, September 2024.         Recommended RSV vaccination.       Follow Up   Return in about 4 months (around 1/27/2024), or if symptoms worsen or fail to improve, for Next scheduled follow up.  Patient was given instructions and counseling regarding her condition or for health maintenance advice. Please see specific information pulled into the AVS if appropriate.

## 2023-10-03 RX ORDER — TEZEPELUMAB-EKKO 210 MG/1.9ML
INJECTION, SOLUTION SUBCUTANEOUS
Qty: 2 EACH | Refills: 4 | Status: SHIPPED | OUTPATIENT
Start: 2023-10-03

## 2023-10-05 ENCOUNTER — CLINICAL SUPPORT (OUTPATIENT)
Dept: PULMONOLOGY | Facility: CLINIC | Age: 68
End: 2023-10-05
Payer: COMMERCIAL

## 2023-10-05 DIAGNOSIS — J45.50 SEVERE PERSISTENT ASTHMA WITHOUT COMPLICATION: Primary | ICD-10-CM

## 2023-10-11 ENCOUNTER — HOSPITAL ENCOUNTER (OUTPATIENT)
Dept: CARDIOLOGY | Facility: HOSPITAL | Age: 68
Discharge: HOME | End: 2023-10-11
Payer: MEDICARE

## 2023-10-11 ENCOUNTER — HOSPITAL ENCOUNTER (OUTPATIENT)
Dept: CARDIOLOGY | Facility: HOSPITAL | Age: 68
Discharge: HOME | End: 2023-10-11
Payer: COMMERCIAL

## 2023-10-11 DIAGNOSIS — R09.89 WEAK CAROTID PULSE: ICD-10-CM

## 2023-10-11 DIAGNOSIS — I73.9 PERIPHERAL VASCULAR DISEASE, UNSPECIFIED: ICD-10-CM

## 2023-10-11 PROCEDURE — 93923 UPR/LXTR ART STDY 3+ LVLS: CPT

## 2023-10-11 PROCEDURE — 93880 EXTRACRANIAL BILAT STUDY: CPT

## 2023-11-02 ENCOUNTER — CLINICAL SUPPORT (OUTPATIENT)
Dept: PULMONOLOGY | Facility: CLINIC | Age: 68
End: 2023-11-02
Payer: COMMERCIAL

## 2023-11-02 DIAGNOSIS — J45.50 SEVERE PERSISTENT ASTHMA WITHOUT COMPLICATION: Primary | ICD-10-CM

## 2023-11-17 ENCOUNTER — APPOINTMENT (OUTPATIENT)
Dept: GENERAL RADIOLOGY | Facility: HOSPITAL | Age: 68
End: 2023-11-17
Payer: COMMERCIAL

## 2023-11-17 ENCOUNTER — HOSPITAL ENCOUNTER (EMERGENCY)
Facility: HOSPITAL | Age: 68
Discharge: HOME | End: 2023-11-17
Attending: EMERGENCY MEDICINE
Payer: MEDICARE

## 2023-11-17 ENCOUNTER — APPOINTMENT (OUTPATIENT)
Dept: CT IMAGING | Facility: HOSPITAL | Age: 68
End: 2023-11-17
Payer: MEDICARE

## 2023-11-17 VITALS
HEART RATE: 77 BPM | RESPIRATION RATE: 18 BRPM | WEIGHT: 235 LBS | DIASTOLIC BLOOD PRESSURE: 58 MMHG | TEMPERATURE: 97.4 F | HEIGHT: 62 IN | OXYGEN SATURATION: 94 % | BODY MASS INDEX: 43.24 KG/M2 | SYSTOLIC BLOOD PRESSURE: 111 MMHG

## 2023-11-17 DIAGNOSIS — J18.9 ATYPICAL PNEUMONIA: Primary | ICD-10-CM

## 2023-11-17 LAB
A-A DO2: 22.8 MMHG (ref 0–300)
ALBUMIN SERPL-MCNC: 3.6 G/DL (ref 3.5–5.2)
ALBUMIN/GLOB SERPL: 1.2 G/DL
ALP SERPL-CCNC: 119 U/L (ref 39–117)
ALT SERPL W P-5'-P-CCNC: 12 U/L (ref 1–33)
ANION GAP SERPL CALCULATED.3IONS-SCNC: 11.4 MMOL/L (ref 5–15)
ARTERIAL PATENCY WRIST A: POSITIVE
AST SERPL-CCNC: 15 U/L (ref 1–32)
ATMOSPHERIC PRESS: 724 MMHG
BASE EXCESS BLDA CALC-SCNC: 3.4 MMOL/L (ref 0–2)
BASOPHILS # BLD AUTO: 0.05 10*3/MM3 (ref 0–0.2)
BASOPHILS NFR BLD AUTO: 0.4 % (ref 0–1.5)
BDY SITE: ABNORMAL
BILIRUB SERPL-MCNC: 0.3 MG/DL (ref 0–1.2)
BUN SERPL-MCNC: 10 MG/DL (ref 8–23)
BUN/CREAT SERPL: 14.1 (ref 7–25)
CALCIUM SPEC-SCNC: 8.9 MG/DL (ref 8.6–10.5)
CHLORIDE SERPL-SCNC: 98 MMOL/L (ref 98–107)
CO2 BLDA-SCNC: 30.6 MMOL/L (ref 22–33)
CO2 SERPL-SCNC: 26.6 MMOL/L (ref 22–29)
COHGB MFR BLD: 1.3 % (ref 0–5)
CREAT SERPL-MCNC: 0.71 MG/DL (ref 0.57–1)
CRP SERPL-MCNC: 0.73 MG/DL (ref 0–0.5)
D-LACTATE SERPL-SCNC: 1.6 MMOL/L (ref 0.5–2)
DEPRECATED RDW RBC AUTO: 52 FL (ref 37–54)
EGFRCR SERPLBLD CKD-EPI 2021: 92.2 ML/MIN/1.73
EOSINOPHIL # BLD AUTO: 0.14 10*3/MM3 (ref 0–0.4)
EOSINOPHIL NFR BLD AUTO: 1.3 % (ref 0.3–6.2)
ERYTHROCYTE [DISTWIDTH] IN BLOOD BY AUTOMATED COUNT: 16.6 % (ref 12.3–15.4)
ERYTHROCYTE [SEDIMENTATION RATE] IN BLOOD: 11 MM/HR (ref 0–30)
FLUAV RNA RESP QL NAA+PROBE: NOT DETECTED
FLUBV RNA RESP QL NAA+PROBE: NOT DETECTED
GLOBULIN UR ELPH-MCNC: 2.9 GM/DL
GLUCOSE SERPL-MCNC: 107 MG/DL (ref 65–99)
HCO3 BLDA-SCNC: 29.1 MMOL/L (ref 20–26)
HCT VFR BLD AUTO: 46.1 % (ref 34–46.6)
HCT VFR BLD CALC: 43.2 % (ref 38–51)
HGB BLD-MCNC: 13.8 G/DL (ref 12–15.9)
HGB BLDA-MCNC: 14.1 G/DL (ref 13.5–17.5)
HOLD SPECIMEN: NORMAL
HOLD SPECIMEN: NORMAL
IMM GRANULOCYTES # BLD AUTO: 0.06 10*3/MM3 (ref 0–0.05)
IMM GRANULOCYTES NFR BLD AUTO: 0.5 % (ref 0–0.5)
INHALED O2 CONCENTRATION: 21 %
LYMPHOCYTES # BLD AUTO: 2.98 10*3/MM3 (ref 0.7–3.1)
LYMPHOCYTES NFR BLD AUTO: 26.8 % (ref 19.6–45.3)
Lab: ABNORMAL
MAGNESIUM SERPL-MCNC: 1.7 MG/DL (ref 1.6–2.4)
MCH RBC QN AUTO: 27 PG (ref 26.6–33)
MCHC RBC AUTO-ENTMCNC: 29.9 G/DL (ref 31.5–35.7)
MCV RBC AUTO: 90 FL (ref 79–97)
METHGB BLD QL: 0 % (ref 0–3)
MODALITY: ABNORMAL
MONOCYTES # BLD AUTO: 0.57 10*3/MM3 (ref 0.1–0.9)
MONOCYTES NFR BLD AUTO: 5.1 % (ref 5–12)
NEUTROPHILS NFR BLD AUTO: 65.9 % (ref 42.7–76)
NEUTROPHILS NFR BLD AUTO: 7.33 10*3/MM3 (ref 1.7–7)
NRBC BLD AUTO-RTO: 0 /100 WBC (ref 0–0.2)
NT-PROBNP SERPL-MCNC: 37 PG/ML (ref 0–900)
OXYHGB MFR BLDV: 91.7 % (ref 94–99)
PCO2 BLDA: 47.6 MM HG (ref 35–45)
PCO2 TEMP ADJ BLD: ABNORMAL MM[HG]
PH BLDA: 7.39 PH UNITS (ref 7.35–7.45)
PH, TEMP CORRECTED: ABNORMAL
PLATELET # BLD AUTO: 330 10*3/MM3 (ref 140–450)
PMV BLD AUTO: 8.7 FL (ref 6–12)
PO2 BLDA: 65.6 MM HG (ref 83–108)
PO2 TEMP ADJ BLD: ABNORMAL MM[HG]
POTASSIUM SERPL-SCNC: 4.3 MMOL/L (ref 3.5–5.2)
PROCALCITONIN SERPL-MCNC: 0.03 NG/ML (ref 0–0.25)
PROT SERPL-MCNC: 6.5 G/DL (ref 6–8.5)
RBC # BLD AUTO: 5.12 10*6/MM3 (ref 3.77–5.28)
SAO2 % BLDCOA: 92.9 % (ref 94–99)
SARS-COV-2 RNA RESP QL NAA+PROBE: NOT DETECTED
SODIUM SERPL-SCNC: 136 MMOL/L (ref 136–145)
TROPONIN T SERPL HS-MCNC: 9 NG/L
VENTILATOR MODE: ABNORMAL
WBC NRBC COR # BLD AUTO: 11.13 10*3/MM3 (ref 3.4–10.8)
WHOLE BLOOD HOLD COAG: NORMAL
WHOLE BLOOD HOLD SPECIMEN: NORMAL

## 2023-11-17 PROCEDURE — 82805 BLOOD GASES W/O2 SATURATION: CPT

## 2023-11-17 PROCEDURE — 85025 COMPLETE CBC W/AUTO DIFF WBC: CPT | Performed by: PHYSICIAN ASSISTANT

## 2023-11-17 PROCEDURE — 86140 C-REACTIVE PROTEIN: CPT | Performed by: PHYSICIAN ASSISTANT

## 2023-11-17 PROCEDURE — 83050 HGB METHEMOGLOBIN QUAN: CPT

## 2023-11-17 PROCEDURE — 94799 UNLISTED PULMONARY SVC/PX: CPT

## 2023-11-17 PROCEDURE — 83605 ASSAY OF LACTIC ACID: CPT | Performed by: PHYSICIAN ASSISTANT

## 2023-11-17 PROCEDURE — 83735 ASSAY OF MAGNESIUM: CPT | Performed by: PHYSICIAN ASSISTANT

## 2023-11-17 PROCEDURE — 87636 SARSCOV2 & INF A&B AMP PRB: CPT | Performed by: PHYSICIAN ASSISTANT

## 2023-11-17 PROCEDURE — 84145 PROCALCITONIN (PCT): CPT | Performed by: PHYSICIAN ASSISTANT

## 2023-11-17 PROCEDURE — 84484 ASSAY OF TROPONIN QUANT: CPT | Performed by: PHYSICIAN ASSISTANT

## 2023-11-17 PROCEDURE — 71250 CT THORAX DX C-: CPT

## 2023-11-17 PROCEDURE — 71045 X-RAY EXAM CHEST 1 VIEW: CPT

## 2023-11-17 PROCEDURE — 87040 BLOOD CULTURE FOR BACTERIA: CPT | Performed by: PHYSICIAN ASSISTANT

## 2023-11-17 PROCEDURE — 85652 RBC SED RATE AUTOMATED: CPT | Performed by: PHYSICIAN ASSISTANT

## 2023-11-17 PROCEDURE — 36415 COLL VENOUS BLD VENIPUNCTURE: CPT

## 2023-11-17 PROCEDURE — 82375 ASSAY CARBOXYHB QUANT: CPT

## 2023-11-17 PROCEDURE — 80053 COMPREHEN METABOLIC PANEL: CPT | Performed by: PHYSICIAN ASSISTANT

## 2023-11-17 PROCEDURE — 71045 X-RAY EXAM CHEST 1 VIEW: CPT | Performed by: RADIOLOGY

## 2023-11-17 PROCEDURE — 83880 ASSAY OF NATRIURETIC PEPTIDE: CPT | Performed by: PHYSICIAN ASSISTANT

## 2023-11-17 PROCEDURE — 94640 AIRWAY INHALATION TREATMENT: CPT

## 2023-11-17 PROCEDURE — 93005 ELECTROCARDIOGRAM TRACING: CPT | Performed by: EMERGENCY MEDICINE

## 2023-11-17 PROCEDURE — 99284 EMERGENCY DEPT VISIT MOD MDM: CPT

## 2023-11-17 PROCEDURE — 71250 CT THORAX DX C-: CPT | Performed by: RADIOLOGY

## 2023-11-17 PROCEDURE — 36600 WITHDRAWAL OF ARTERIAL BLOOD: CPT

## 2023-11-17 RX ORDER — BENZONATATE 100 MG/1
200 CAPSULE ORAL ONCE
Status: COMPLETED | OUTPATIENT
Start: 2023-11-17 | End: 2023-11-17

## 2023-11-17 RX ORDER — IPRATROPIUM BROMIDE AND ALBUTEROL SULFATE 2.5; .5 MG/3ML; MG/3ML
3 SOLUTION RESPIRATORY (INHALATION) ONCE
Status: COMPLETED | OUTPATIENT
Start: 2023-11-17 | End: 2023-11-17

## 2023-11-17 RX ORDER — SODIUM CHLORIDE 0.9 % (FLUSH) 0.9 %
10 SYRINGE (ML) INJECTION AS NEEDED
Status: DISCONTINUED | OUTPATIENT
Start: 2023-11-17 | End: 2023-11-17 | Stop reason: HOSPADM

## 2023-11-17 RX ADMIN — IPRATROPIUM BROMIDE AND ALBUTEROL SULFATE 3 ML: .5; 2.5 SOLUTION RESPIRATORY (INHALATION) at 18:23

## 2023-11-17 RX ADMIN — BENZONATATE 200 MG: 100 CAPSULE ORAL at 19:00

## 2023-11-18 LAB
QT INTERVAL: 430 MS
QTC INTERVAL: 460 MS

## 2023-11-18 NOTE — ED PROVIDER NOTES
"Subjective   History of Present Illness  68-year-old female who presents to the ED today with a 2-week history of cough and chest tightness.  She states she is also had some chills.  She went to her primary care provider when this first started and she was started on 10 days of doxycycline which she is almost done with and also did a course of prednisone which she finished earlier this week.  She had a chest x-ray 2 days ago which showed no evidence of pneumonia.  She went back to her primary care provider because she was not getting any better and was prescribed a course of Zithromax.  She states she has not started this yet.  She states last night she started to have some upper back pain.  She states it hurts when she moves or coughs.  She has been taking Tylenol for the pain.  The patient does have a history of asthma.  She states she wears oxygen at home as needed.  She states she did wear it all night last night because she felt like she was \"smothering.\"  She also has a history of diabetes.    History provided by:  Patient  URI  Presenting symptoms: congestion, cough and fatigue    Presenting symptoms: no fever    Severity:  Moderate  Onset quality:  Gradual  Duration:  2 weeks  Timing:  Constant  Progression:  Worsening  Chronicity:  New  Relieved by:  Nothing  Worsened by:  Nothing  Associated symptoms: myalgias and wheezing    Risk factors: chronic respiratory disease and diabetes mellitus        Review of Systems   Constitutional:  Positive for chills and fatigue. Negative for fever.   HENT:  Positive for congestion.    Eyes: Negative.    Respiratory:  Positive for cough, chest tightness, shortness of breath and wheezing. Negative for stridor.    Cardiovascular: Negative.    Gastrointestinal: Negative.    Genitourinary: Negative.    Musculoskeletal:  Positive for back pain and myalgias.   Skin: Negative.    Neurological: Negative.    Psychiatric/Behavioral: Negative.     All other systems reviewed and are " negative.      Past Medical History:   Diagnosis Date    Asthma     CHF (congestive heart failure)     Chronic bronchitis     Deep vein thrombosis     Diabetes mellitus     Diverticulitis     Hyperlipidemia 1999    Hypertension        Allergies   Allergen Reactions    Adhesive Tape Rash    Cephalexin Hives    Cephalosporins Hives    Ciprofloxacin Hcl Hives    Penicillins Hives       Past Surgical History:   Procedure Laterality Date    ANKLE SURGERY      BACK SURGERY      CHOLECYSTECTOMY      FOOT SURGERY Left     GASTRIC STIMULATOR IMPLANT SURGERY  2017    changed battery 2022    HAND SURGERY      HERNIA REPAIR      HYSTERECTOMY      KNEE SURGERY Left     TUBAL ABDOMINAL LIGATION         Family History   Problem Relation Age of Onset    Osteoporosis Mother     Clotting disorder Mother     Cancer Father     Cancer Sister     Anemia Sister     Fainting Sister     Hypertension Sister     Asthma Sister     Asthma Brother     Asthma Maternal Grandfather     Heart attack Paternal Grandmother     Heart disease Paternal Grandmother        Social History     Socioeconomic History    Marital status:    Tobacco Use    Smoking status: Former     Packs/day: 2.00     Years: 28.00     Additional pack years: 0.00     Total pack years: 56.00     Types: Cigarettes     Quit date: 2011     Years since quittin.8     Passive exposure: Past    Smokeless tobacco: Never   Vaping Use    Vaping Use: Never used   Substance and Sexual Activity    Alcohol use: Never    Drug use: Never    Sexual activity: Not Currently     Partners: Male     Birth control/protection: Condom, Pill, Surgical, Tubal ligation, Birth control pill, Hysterectomy           Objective   Physical Exam  Vitals and nursing note reviewed.   Constitutional:       General: She is not in acute distress.     Appearance: She is well-developed. She is obese. She is not diaphoretic.   HENT:      Head: Normocephalic and atraumatic.   Eyes:       Extraocular Movements: Extraocular movements intact.      Pupils: Pupils are equal, round, and reactive to light.   Neck:      Vascular: No JVD.      Trachea: No tracheal deviation.   Cardiovascular:      Rate and Rhythm: Normal rate and regular rhythm.      Pulses: Normal pulses.      Heart sounds: Normal heart sounds.   Pulmonary:      Effort: Pulmonary effort is normal.      Breath sounds: Examination of the right-upper field reveals wheezing. Examination of the left-upper field reveals wheezing. Wheezing present.   Chest:      Chest wall: No tenderness.   Abdominal:      General: Bowel sounds are normal.      Palpations: Abdomen is soft.      Tenderness: There is no abdominal tenderness.   Musculoskeletal:         General: Normal range of motion.      Cervical back: Normal range of motion and neck supple.      Right lower leg: No edema.      Left lower leg: No edema.   Lymphadenopathy:      Cervical: No cervical adenopathy.   Skin:     General: Skin is warm and dry.      Capillary Refill: Capillary refill takes less than 2 seconds.   Neurological:      General: No focal deficit present.      Mental Status: She is alert and oriented to person, place, and time.   Psychiatric:         Mood and Affect: Mood normal.         Procedures           ED Course  ED Course as of 11/18/23 0952   Fri Nov 17, 2023 1917 XR Chest 1 View  IMPRESSION:     1. Minimal interstitial prominence could reflect reactive airway disease  or atypical infection.   []   1937 Endorsed to Dr. Auugstin []   2046 Patient care taken over from midlevel provider.  Patient has 2 additional days left of doxycycline which has been informed to continue.  Patient is having symptomatic visceral pain at sites of tree-in-bud appearance seen on CT that shows persisting pneumonia based on patient's complex allergy profile Zithromax is the choice at this time and she was already prescribed the antibiotic but has not started the medication yet.  She been  instructed to go ahead and proceed with that medication and return to the ER if she has worsening symptoms.  Electronically signed by Anika Augustin DO, 11/17/23, 8:46 PM EST.' [LK]      ED Course User Index  [AH] Shiloh Ortiz PA  [LK] Anika Augustin DO                                           Medical Decision Making  Problems Addressed:  Atypical pneumonia: complicated acute illness or injury    Amount and/or Complexity of Data Reviewed  Labs: ordered.  Radiology: ordered. Decision-making details documented in ED Course.  ECG/medicine tests: ordered.    Risk  Prescription drug management.        Final diagnoses:   Atypical pneumonia   Electronically signed by MIYA Medeiros, 11/17/23, 7:38 PM EST.     ED Disposition  ED Disposition       ED Disposition   Discharge    Condition   Stable    Comment   --               Nela Shaw MD  [address] [address]  [phone]               Medication List      No changes were made to your prescriptions during this visit.            Shiloh Ortiz PA  11/18/23 0984

## 2023-11-18 NOTE — DISCHARGE INSTRUCTIONS
Use albuterol every 4 hours as needed.  Proceed with beginning a Zithromax and return if you develop worsening shortness of breath chest pain or fever.

## 2023-11-22 ENCOUNTER — APPOINTMENT (OUTPATIENT)
Dept: CT IMAGING | Facility: HOSPITAL | Age: 68
End: 2023-11-22
Payer: MEDICARE

## 2023-11-22 ENCOUNTER — APPOINTMENT (OUTPATIENT)
Dept: GENERAL RADIOLOGY | Facility: HOSPITAL | Age: 68
End: 2023-11-22
Payer: COMMERCIAL

## 2023-11-22 ENCOUNTER — HOSPITAL ENCOUNTER (EMERGENCY)
Facility: HOSPITAL | Age: 68
Discharge: HOME | End: 2023-11-22
Attending: EMERGENCY MEDICINE | Admitting: EMERGENCY MEDICINE
Payer: MEDICARE

## 2023-11-22 VITALS
WEIGHT: 235 LBS | HEART RATE: 77 BPM | BODY MASS INDEX: 43.24 KG/M2 | OXYGEN SATURATION: 96 % | SYSTOLIC BLOOD PRESSURE: 120 MMHG | RESPIRATION RATE: 18 BRPM | HEIGHT: 62 IN | DIASTOLIC BLOOD PRESSURE: 69 MMHG | TEMPERATURE: 98.4 F

## 2023-11-22 DIAGNOSIS — J18.9 PNEUMONIA OF RIGHT LUNG DUE TO INFECTIOUS ORGANISM, UNSPECIFIED PART OF LUNG: Primary | ICD-10-CM

## 2023-11-22 LAB
A-A DO2: 27.8 MMHG (ref 0–300)
ALBUMIN SERPL-MCNC: 3.7 G/DL (ref 3.5–5.2)
ALBUMIN/GLOB SERPL: 1.3 G/DL
ALP SERPL-CCNC: 128 U/L (ref 39–117)
ALT SERPL W P-5'-P-CCNC: 8 U/L (ref 1–33)
ANION GAP SERPL CALCULATED.3IONS-SCNC: 13.5 MMOL/L (ref 5–15)
ARTERIAL PATENCY WRIST A: POSITIVE
AST SERPL-CCNC: 11 U/L (ref 1–32)
ATMOSPHERIC PRESS: 727 MMHG
BACTERIA SPEC AEROBE CULT: NORMAL
BACTERIA SPEC AEROBE CULT: NORMAL
BASE EXCESS BLDA CALC-SCNC: 5.3 MMOL/L (ref 0–2)
BASOPHILS # BLD AUTO: 0.05 10*3/MM3 (ref 0–0.2)
BASOPHILS NFR BLD AUTO: 0.4 % (ref 0–1.5)
BDY SITE: ABNORMAL
BILIRUB SERPL-MCNC: 0.3 MG/DL (ref 0–1.2)
BUN SERPL-MCNC: 9 MG/DL (ref 8–23)
BUN/CREAT SERPL: 10.3 (ref 7–25)
CALCIUM SPEC-SCNC: 8.8 MG/DL (ref 8.6–10.5)
CHLORIDE SERPL-SCNC: 95 MMOL/L (ref 98–107)
CO2 BLDA-SCNC: 32.9 MMOL/L (ref 22–33)
CO2 SERPL-SCNC: 27.5 MMOL/L (ref 22–29)
COHGB MFR BLD: 1.4 % (ref 0–5)
CREAT SERPL-MCNC: 0.87 MG/DL (ref 0.57–1)
CRP SERPL-MCNC: 3.94 MG/DL (ref 0–0.5)
DEPRECATED RDW RBC AUTO: 50.9 FL (ref 37–54)
EGFRCR SERPLBLD CKD-EPI 2021: 72.2 ML/MIN/1.73
EOSINOPHIL # BLD AUTO: 0.1 10*3/MM3 (ref 0–0.4)
EOSINOPHIL NFR BLD AUTO: 0.8 % (ref 0.3–6.2)
ERYTHROCYTE [DISTWIDTH] IN BLOOD BY AUTOMATED COUNT: 16 % (ref 12.3–15.4)
FLUAV RNA RESP QL NAA+PROBE: NOT DETECTED
FLUBV RNA RESP QL NAA+PROBE: NOT DETECTED
GEN 5 2HR TROPONIN T REFLEX: 12 NG/L
GLOBULIN UR ELPH-MCNC: 2.9 GM/DL
GLUCOSE SERPL-MCNC: 103 MG/DL (ref 65–99)
HCO3 BLDA-SCNC: 31.3 MMOL/L (ref 20–26)
HCT VFR BLD AUTO: 47.3 % (ref 34–46.6)
HCT VFR BLD CALC: 41.4 % (ref 38–51)
HGB BLD-MCNC: 14.1 G/DL (ref 12–15.9)
HGB BLDA-MCNC: 13.5 G/DL (ref 13.5–17.5)
HOLD SPECIMEN: NORMAL
HOLD SPECIMEN: NORMAL
IMM GRANULOCYTES # BLD AUTO: 0.06 10*3/MM3 (ref 0–0.05)
IMM GRANULOCYTES NFR BLD AUTO: 0.5 % (ref 0–0.5)
INHALED O2 CONCENTRATION: 21 %
L PNEUMO1 AG UR QL IA: NEGATIVE
LYMPHOCYTES # BLD AUTO: 2.66 10*3/MM3 (ref 0.7–3.1)
LYMPHOCYTES NFR BLD AUTO: 21.5 % (ref 19.6–45.3)
Lab: ABNORMAL
MCH RBC QN AUTO: 26.1 PG (ref 26.6–33)
MCHC RBC AUTO-ENTMCNC: 29.8 G/DL (ref 31.5–35.7)
MCV RBC AUTO: 87.6 FL (ref 79–97)
METHGB BLD QL: <-0.1 % (ref 0–3)
MODALITY: ABNORMAL
MONOCYTES # BLD AUTO: 0.77 10*3/MM3 (ref 0.1–0.9)
MONOCYTES NFR BLD AUTO: 6.2 % (ref 5–12)
NEUTROPHILS NFR BLD AUTO: 70.6 % (ref 42.7–76)
NEUTROPHILS NFR BLD AUTO: 8.74 10*3/MM3 (ref 1.7–7)
NOTE: ABNORMAL
NOTIFIED BY: ABNORMAL
NOTIFIED WHO: ABNORMAL
NRBC BLD AUTO-RTO: 0 /100 WBC (ref 0–0.2)
NT-PROBNP SERPL-MCNC: 81.3 PG/ML (ref 0–900)
OXYHGB MFR BLDV: 89.2 % (ref 94–99)
PCO2 BLDA: 50.3 MM HG (ref 35–45)
PCO2 TEMP ADJ BLD: ABNORMAL MM[HG]
PH BLDA: 7.4 PH UNITS (ref 7.35–7.45)
PH, TEMP CORRECTED: ABNORMAL
PLATELET # BLD AUTO: 373 10*3/MM3 (ref 140–450)
PMV BLD AUTO: 9.1 FL (ref 6–12)
PO2 BLDA: 58.1 MM HG (ref 83–108)
PO2 TEMP ADJ BLD: ABNORMAL MM[HG]
POTASSIUM SERPL-SCNC: 4.4 MMOL/L (ref 3.5–5.2)
PROCALCITONIN SERPL-MCNC: 0.03 NG/ML (ref 0–0.25)
PROT SERPL-MCNC: 6.6 G/DL (ref 6–8.5)
RBC # BLD AUTO: 5.4 10*6/MM3 (ref 3.77–5.28)
SAO2 % BLDCOA: 90.2 % (ref 94–99)
SARS-COV-2 RNA RESP QL NAA+PROBE: NOT DETECTED
SODIUM SERPL-SCNC: 136 MMOL/L (ref 136–145)
TROPONIN T DELTA: 1 NG/L
TROPONIN T SERPL HS-MCNC: 11 NG/L
VENTILATOR MODE: ABNORMAL
WBC NRBC COR # BLD AUTO: 12.38 10*3/MM3 (ref 3.4–10.8)
WHOLE BLOOD HOLD COAG: NORMAL
WHOLE BLOOD HOLD SPECIMEN: NORMAL

## 2023-11-22 PROCEDURE — 87636 SARSCOV2 & INF A&B AMP PRB: CPT | Performed by: PHYSICIAN ASSISTANT

## 2023-11-22 PROCEDURE — 86140 C-REACTIVE PROTEIN: CPT | Performed by: PHYSICIAN ASSISTANT

## 2023-11-22 PROCEDURE — 82375 ASSAY CARBOXYHB QUANT: CPT

## 2023-11-22 PROCEDURE — 94799 UNLISTED PULMONARY SVC/PX: CPT

## 2023-11-22 PROCEDURE — 87449 NOS EACH ORGANISM AG IA: CPT | Performed by: PHYSICIAN ASSISTANT

## 2023-11-22 PROCEDURE — 94761 N-INVAS EAR/PLS OXIMETRY MLT: CPT

## 2023-11-22 PROCEDURE — 84145 PROCALCITONIN (PCT): CPT | Performed by: PHYSICIAN ASSISTANT

## 2023-11-22 PROCEDURE — 80053 COMPREHEN METABOLIC PANEL: CPT | Performed by: PHYSICIAN ASSISTANT

## 2023-11-22 PROCEDURE — 36600 WITHDRAWAL OF ARTERIAL BLOOD: CPT

## 2023-11-22 PROCEDURE — 71045 X-RAY EXAM CHEST 1 VIEW: CPT | Performed by: RADIOLOGY

## 2023-11-22 PROCEDURE — 85025 COMPLETE CBC W/AUTO DIFF WBC: CPT | Performed by: PHYSICIAN ASSISTANT

## 2023-11-22 PROCEDURE — 84484 ASSAY OF TROPONIN QUANT: CPT | Performed by: PHYSICIAN ASSISTANT

## 2023-11-22 PROCEDURE — 82805 BLOOD GASES W/O2 SATURATION: CPT

## 2023-11-22 PROCEDURE — 99284 EMERGENCY DEPT VISIT MOD MDM: CPT

## 2023-11-22 PROCEDURE — 71045 X-RAY EXAM CHEST 1 VIEW: CPT

## 2023-11-22 PROCEDURE — 71250 CT THORAX DX C-: CPT

## 2023-11-22 PROCEDURE — 71250 CT THORAX DX C-: CPT | Performed by: RADIOLOGY

## 2023-11-22 PROCEDURE — 93005 ELECTROCARDIOGRAM TRACING: CPT | Performed by: PHYSICIAN ASSISTANT

## 2023-11-22 PROCEDURE — 87899 AGENT NOS ASSAY W/OPTIC: CPT | Performed by: PHYSICIAN ASSISTANT

## 2023-11-22 PROCEDURE — 83050 HGB METHEMOGLOBIN QUAN: CPT

## 2023-11-22 PROCEDURE — 83880 ASSAY OF NATRIURETIC PEPTIDE: CPT | Performed by: PHYSICIAN ASSISTANT

## 2023-11-22 PROCEDURE — 36415 COLL VENOUS BLD VENIPUNCTURE: CPT

## 2023-11-22 RX ORDER — GUAIFENESIN 600 MG/1
600 TABLET, EXTENDED RELEASE ORAL 2 TIMES DAILY
Qty: 30 TABLET | Refills: 0 | Status: SHIPPED | OUTPATIENT
Start: 2023-11-22

## 2023-11-22 RX ORDER — LEVOFLOXACIN 750 MG/1
750 TABLET, FILM COATED ORAL ONCE
Status: COMPLETED | OUTPATIENT
Start: 2023-11-22 | End: 2023-11-22

## 2023-11-22 RX ORDER — DOXYCYCLINE 100 MG/1
100 CAPSULE ORAL EVERY 12 HOURS SCHEDULED
Status: DISCONTINUED | OUTPATIENT
Start: 2023-11-22 | End: 2023-11-22 | Stop reason: HOSPADM

## 2023-11-22 RX ORDER — SODIUM CHLORIDE 0.9 % (FLUSH) 0.9 %
10 SYRINGE (ML) INJECTION AS NEEDED
Status: DISCONTINUED | OUTPATIENT
Start: 2023-11-22 | End: 2023-11-22 | Stop reason: HOSPADM

## 2023-11-22 RX ORDER — DOXYCYCLINE 100 MG/1
100 CAPSULE ORAL 2 TIMES DAILY
Qty: 20 CAPSULE | Refills: 0 | Status: SHIPPED | OUTPATIENT
Start: 2023-11-22

## 2023-11-22 RX ORDER — GUAIFENESIN 600 MG/1
1200 TABLET, EXTENDED RELEASE ORAL ONCE
Status: COMPLETED | OUTPATIENT
Start: 2023-11-22 | End: 2023-11-22

## 2023-11-22 RX ORDER — DOXYCYCLINE 100 MG/1
100 CAPSULE ORAL ONCE
Status: COMPLETED | OUTPATIENT
Start: 2023-11-22 | End: 2023-11-22

## 2023-11-22 RX ORDER — LEVOFLOXACIN 750 MG/1
750 TABLET, FILM COATED ORAL DAILY
Qty: 5 TABLET | Refills: 0 | Status: SHIPPED | OUTPATIENT
Start: 2023-11-22

## 2023-11-22 RX ADMIN — GUAIFENESIN 1200 MG: 600 TABLET, EXTENDED RELEASE ORAL at 19:44

## 2023-11-22 RX ADMIN — LEVOFLOXACIN 750 MG: 750 TABLET, FILM COATED ORAL at 19:38

## 2023-11-22 RX ADMIN — DOXYCYCLINE 100 MG: 100 CAPSULE ORAL at 20:00

## 2023-11-22 RX ADMIN — LEVOFLOXACIN 750 MG: 750 TABLET, FILM COATED ORAL at 19:37

## 2023-11-22 RX ADMIN — DOXYCYCLINE 100 MG: 100 CAPSULE ORAL at 19:37

## 2023-11-22 NOTE — ED NOTES
MEDICAL SCREENING:    Reason for Visit: SOA and cough x 3 weeks, been on 2 different abx and steroids and continuing to get worse. Sent by PCP for possible IV abx.    Patient initially seen in triage.  The patient was advised further evaluation and diagnostic testing will be needed, some of the treatment and testing will be initiated in the lobby in order to begin the process.  The patient will be returned to the waiting area for the time being and possibly be re-assessed by a subsequent ED provider.  The patient will be brought back to the treatment area in as timely manner as possible.       Mihir Herring, PAWilfridoC  11/22/23 1534

## 2023-11-22 NOTE — ED PROVIDER NOTES
Subjective   History of Present Illness  68-year-old female with past medical history of asthma, CHF, DVT, diabetes, diverticulitis, hyperlipidemia, and hypertension presents to the emergency room with persistent cough and shortness of breath for the past 3 weeks.  Patient states 3 weeks ago she was diagnosed with pneumonia and placed on antibiotics.  Patient states that she has been on penicillin, Cipro, Keflex, and steroids without any resolution of symptoms.  Patient states she was sent to the emergency room this day by her primary care provider for worsening pneumonia.  Patient denies any fever, chills, or body aches.  She does state that she has a persistent cough which is nonproductive.  Denies any specific aggravating or alleviating factors despite the above-mentioned.  Denies any other complaints or concerns at this time.    History provided by:  Patient   used: No        Review of Systems   Constitutional: Negative.  Negative for fever.   HENT: Negative.     Respiratory:  Positive for cough and shortness of breath.    Cardiovascular: Negative.  Negative for chest pain.   Gastrointestinal: Negative.  Negative for abdominal pain.   Endocrine: Negative.    Genitourinary: Negative.  Negative for dysuria.   Skin: Negative.    Neurological: Negative.    Psychiatric/Behavioral: Negative.     All other systems reviewed and are negative.      Past Medical History:   Diagnosis Date    Asthma 1978    CHF (congestive heart failure) 2021    Chronic bronchitis     Deep vein thrombosis 2015    Diabetes mellitus     Diverticulitis     Hyperlipidemia 1999    Hypertension        Allergies   Allergen Reactions    Adhesive Tape Rash    Cephalexin Hives    Cephalosporins Hives    Ciprofloxacin Hcl Hives    Penicillins Hives       Past Surgical History:   Procedure Laterality Date    ANKLE SURGERY      BACK SURGERY      CHOLECYSTECTOMY      FOOT SURGERY Left     GASTRIC STIMULATOR IMPLANT SURGERY  2017     changed battery 2022    HAND SURGERY      HERNIA REPAIR      HYSTERECTOMY      KNEE SURGERY Left     TUBAL ABDOMINAL LIGATION         Family History   Problem Relation Age of Onset    Osteoporosis Mother     Clotting disorder Mother     Cancer Father     Cancer Sister     Anemia Sister     Fainting Sister     Hypertension Sister     Asthma Sister     Asthma Brother     Asthma Maternal Grandfather     Heart attack Paternal Grandmother     Heart disease Paternal Grandmother        Social History     Socioeconomic History    Marital status:    Tobacco Use    Smoking status: Former     Packs/day: 2.00     Years: 28.00     Additional pack years: 0.00     Total pack years: 56.00     Types: Cigarettes     Quit date: 2011     Years since quittin.8     Passive exposure: Past    Smokeless tobacco: Never   Vaping Use    Vaping Use: Never used   Substance and Sexual Activity    Alcohol use: Never    Drug use: Never    Sexual activity: Not Currently     Partners: Male     Birth control/protection: Condom, Pill, Surgical, Tubal ligation, Birth control pill, Hysterectomy           Objective   Physical Exam  Vitals and nursing note reviewed.   Constitutional:       General: She is not in acute distress.     Appearance: She is well-developed. She is obese. She is not diaphoretic.   HENT:      Head: Normocephalic and atraumatic.      Right Ear: External ear normal.      Left Ear: External ear normal.      Nose: Nose normal.   Eyes:      Conjunctiva/sclera: Conjunctivae normal.      Pupils: Pupils are equal, round, and reactive to light.   Neck:      Vascular: No JVD.      Trachea: No tracheal deviation.   Cardiovascular:      Rate and Rhythm: Normal rate and regular rhythm.      Heart sounds: Normal heart sounds. No murmur heard.  Pulmonary:      Effort: Pulmonary effort is normal. No respiratory distress.      Breath sounds: Rhonchi present. No wheezing.   Abdominal:      General: Bowel sounds are normal.       Palpations: Abdomen is soft.      Tenderness: There is no abdominal tenderness.   Musculoskeletal:         General: No deformity. Normal range of motion.      Cervical back: Normal range of motion and neck supple.   Skin:     General: Skin is warm and dry.      Coloration: Skin is not pale.      Findings: No erythema or rash.   Neurological:      Mental Status: She is alert and oriented to person, place, and time.      Cranial Nerves: No cranial nerve deficit.   Psychiatric:         Behavior: Behavior normal.         Thought Content: Thought content normal.         Procedures           ED Course  ED Course as of 11/22/23 2055 Wed Nov 22, 2023   1609 ECG 12 Lead Dyspnea  Normal sinus rhythm.  Rate 74.  Left axis deviation.  Diffuse low voltage.  QT interval.  No acute ischemic changes.  EKG.  Interpreted by me.  Electronically signed by Tommy Vizcarra MD, 11/22/23, 4:10 PM EST.   [BC]   1636 XR Chest 1 View [TK]   1833 CT Chest Without Contrast Diagnostic [TK]   1945 Discussed pt with Dr. Soto, ER attending who recommends Levaquin 750mg and Doxycycline 100mg BID with outpatient follow up with pulmonology. [TK]      ED Course User Index  [BC] Tommy Vizcarra MD  [TK] Mihir Herring, PAELVIA           Results for orders placed or performed during the hospital encounter of 11/22/23   COVID-19 and FLU A/B PCR, 1 HR TAT - Swab, Nasopharynx    Specimen: Nasopharynx; Swab   Result Value Ref Range    COVID19 Not Detected Not Detected - Ref. Range    Influenza A PCR Not Detected Not Detected    Influenza B PCR Not Detected Not Detected   Legionella Antigen, Urine - Urine, Urine, Clean Catch    Specimen: Urine, Clean Catch   Result Value Ref Range    LEGIONELLA ANTIGEN, URINE Negative Negative   Comprehensive Metabolic Panel    Specimen: Arm, Left; Blood   Result Value Ref Range    Glucose 103 (H) 65 - 99 mg/dL    BUN 9 8 - 23 mg/dL    Creatinine 0.87 0.57 - 1.00 mg/dL    Sodium 136 136 - 145 mmol/L    Potassium 4.4 3.5  - 5.2 mmol/L    Chloride 95 (L) 98 - 107 mmol/L    CO2 27.5 22.0 - 29.0 mmol/L    Calcium 8.8 8.6 - 10.5 mg/dL    Total Protein 6.6 6.0 - 8.5 g/dL    Albumin 3.7 3.5 - 5.2 g/dL    ALT (SGPT) 8 1 - 33 U/L    AST (SGOT) 11 1 - 32 U/L    Alkaline Phosphatase 128 (H) 39 - 117 U/L    Total Bilirubin 0.3 0.0 - 1.2 mg/dL    Globulin 2.9 gm/dL    A/G Ratio 1.3 g/dL    BUN/Creatinine Ratio 10.3 7.0 - 25.0    Anion Gap 13.5 5.0 - 15.0 mmol/L    eGFR 72.2 >60.0 mL/min/1.73   C-reactive Protein    Specimen: Arm, Left; Blood   Result Value Ref Range    C-Reactive Protein 3.94 (H) 0.00 - 0.50 mg/dL   BNP    Specimen: Arm, Left; Blood   Result Value Ref Range    proBNP 81.3 0.0 - 900.0 pg/mL   High Sensitivity Troponin T    Specimen: Arm, Left; Blood   Result Value Ref Range    HS Troponin T 11 <14 ng/L   CBC Auto Differential    Specimen: Arm, Left; Blood   Result Value Ref Range    WBC 12.38 (H) 3.40 - 10.80 10*3/mm3    RBC 5.40 (H) 3.77 - 5.28 10*6/mm3    Hemoglobin 14.1 12.0 - 15.9 g/dL    Hematocrit 47.3 (H) 34.0 - 46.6 %    MCV 87.6 79.0 - 97.0 fL    MCH 26.1 (L) 26.6 - 33.0 pg    MCHC 29.8 (L) 31.5 - 35.7 g/dL    RDW 16.0 (H) 12.3 - 15.4 %    RDW-SD 50.9 37.0 - 54.0 fl    MPV 9.1 6.0 - 12.0 fL    Platelets 373 140 - 450 10*3/mm3    Neutrophil % 70.6 42.7 - 76.0 %    Lymphocyte % 21.5 19.6 - 45.3 %    Monocyte % 6.2 5.0 - 12.0 %    Eosinophil % 0.8 0.3 - 6.2 %    Basophil % 0.4 0.0 - 1.5 %    Immature Grans % 0.5 0.0 - 0.5 %    Neutrophils, Absolute 8.74 (H) 1.70 - 7.00 10*3/mm3    Lymphocytes, Absolute 2.66 0.70 - 3.10 10*3/mm3    Monocytes, Absolute 0.77 0.10 - 0.90 10*3/mm3    Eosinophils, Absolute 0.10 0.00 - 0.40 10*3/mm3    Basophils, Absolute 0.05 0.00 - 0.20 10*3/mm3    Immature Grans, Absolute 0.06 (H) 0.00 - 0.05 10*3/mm3    nRBC 0.0 0.0 - 0.2 /100 WBC   Procalcitonin    Specimen: Arm, Left; Blood   Result Value Ref Range    Procalcitonin 0.03 0.00 - 0.25 ng/mL   High Sensitivity Troponin T 2Hr    Specimen: Arm,  Right; Blood   Result Value Ref Range    HS Troponin T 12 <14 ng/L    Troponin T Delta 1 >=-4 - <+4 ng/L   Blood Gas, Arterial With Co-Ox    Specimen: Arterial Blood   Result Value Ref Range    Site Left Radial     Blanco's Test Positive     pH, Arterial 7.403 7.350 - 7.450 pH units    pCO2, Arterial 50.3 (H) 35.0 - 45.0 mm Hg    pO2, Arterial 58.1 (L) 83.0 - 108.0 mm Hg    HCO3, Arterial 31.3 (H) 20.0 - 26.0 mmol/L    Base Excess, Arterial 5.3 (H) 0.0 - 2.0 mmol/L    O2 Saturation, Arterial 90.2 (L) 94.0 - 99.0 %    Hemoglobin, Blood Gas 13.5 13.5 - 17.5 g/dL    Hematocrit, Blood Gas 41.4 38.0 - 51.0 %    Oxyhemoglobin 89.2 (L) 94 - 99 %    Methemoglobin <-0.10 (L) 0.00 - 3.00 %    Carboxyhemoglobin 1.4 0 - 5 %    A-a DO2 27.8 0.0 - 300.0 mmHg    CO2 Content 32.9 22 - 33 mmol/L    Barometric Pressure for Blood Gas 727 mmHg    Modality Room Air     FIO2 21 %    Ventilator Mode NA     Note       AFTER ABG I PLACED 2LPM NC ON PT. SHE SAYS SHE WEARS O2 PRN AT HOME    Notified Who IB AND RN     Notified By [id number]     Collected by [id number]     pH, Temp Corrected      pCO2, Temperature Corrected      pO2, Temperature Corrected     ECG 12 Lead Dyspnea   Result Value Ref Range    QT Interval 388 ms    QTC Interval 430 ms   Green Top (Gel)   Result Value Ref Range    Extra Tube Hold for add-ons.    Lavender Top   Result Value Ref Range    Extra Tube hold for add-on    Gold Top - SST   Result Value Ref Range    Extra Tube Hold for add-ons.    Light Blue Top   Result Value Ref Range    Extra Tube Hold for add-ons.        CT Chest Without Contrast Diagnostic   Final Result   Impression:       Mild worsening of right upper lobe infection.        This report was finalized on 11/22/2023 6:07 PM by Shaquille Romeo MD.          XR Chest 1 View   Final Result     Unremarkable exam. No acute cardiopulmonary findings identified.           This report was finalized on 11/22/2023 4:21 PM by Dr. Chris Robert MD.                                               Medical Decision Making  68-year-old female with past medical history of asthma, CHF, DVT, diabetes, diverticulitis, hyperlipidemia, and hypertension presents to the emergency room with persistent cough and shortness of breath for the past 3 weeks.  Patient states 3 weeks ago she was diagnosed with pneumonia and placed on antibiotics.  Patient states that she has been on penicillin, Cipro, Keflex, and steroids without any resolution of symptoms.  Patient states she was sent to the emergency room this day by her primary care provider for worsening pneumonia.  Patient denies any fever, chills, or body aches.  She does state that she has a persistent cough which is nonproductive.  Denies any specific aggravating or alleviating factors despite the above-mentioned.  Denies any other complaints or concerns at this time.      Problems Addressed:  Pneumonia of right lung due to infectious organism, unspecified part of lung: complicated acute illness or injury    Amount and/or Complexity of Data Reviewed  Labs: ordered. Decision-making details documented in ED Course.  Radiology: ordered. Decision-making details documented in ED Course.  ECG/medicine tests: ordered. Decision-making details documented in ED Course.    Risk  OTC drugs.  Prescription drug management.        Final diagnoses:   Pneumonia of right lung due to infectious organism, unspecified part of lung       ED Disposition  ED Disposition       ED Disposition   Discharge    Condition   Stable    Comment   --               Bridget Butterfield, APRN  [address] [id number] [phone]    In 2 days           Medication List        New Prescriptions      doxycycline 100 MG capsule  Commonly known as: MONODOX  Take 1 capsule by mouth 2 (Two) Times a Day.     guaiFENesin 600 MG 12 hr tablet  Commonly known as: MUCINEX  Take 1 tablet by mouth 2 (Two) Times a Day.     levoFLOXacin 750 MG tablet  Commonly known as:  LEVAQUIN  Take 1 tablet by mouth Daily.               Where to Get Your Medications        These medications were sent to [facility], [place] - [address] - [phone]  - [phone] [facility] [address] [facility] [address]      Phone: [phone]   doxycycline 100 MG capsule  guaiFENesin 600 MG 12 hr tablet  levoFLOXacin 750 MG tablet            Mihir Herring, PA-C  11/22/23 5156

## 2023-11-23 LAB
QT INTERVAL: 388 MS
QTC INTERVAL: 430 MS
S PNEUM AG SPEC QL LA: NEGATIVE

## 2023-12-04 RX ORDER — CLOPIDOGREL BISULFATE 75 MG/1
75 TABLET ORAL DAILY
Qty: 30 TABLET | Refills: 11 | Status: SHIPPED | OUTPATIENT
Start: 2023-12-04

## 2023-12-28 ENCOUNTER — CLINICAL SUPPORT (OUTPATIENT)
Dept: PULMONOLOGY | Facility: CLINIC | Age: 68
End: 2023-12-28
Payer: COMMERCIAL

## 2023-12-28 DIAGNOSIS — J45.50 SEVERE PERSISTENT ASTHMA WITHOUT COMPLICATION: Primary | ICD-10-CM

## 2024-01-25 ENCOUNTER — OFFICE VISIT (OUTPATIENT)
Dept: PULMONOLOGY | Facility: CLINIC | Age: 69
End: 2024-01-25
Payer: COMMERCIAL

## 2024-01-25 VITALS
HEIGHT: 63 IN | BODY MASS INDEX: 41.82 KG/M2 | HEART RATE: 70 BPM | WEIGHT: 236 LBS | TEMPERATURE: 97.1 F | DIASTOLIC BLOOD PRESSURE: 72 MMHG | SYSTOLIC BLOOD PRESSURE: 112 MMHG | OXYGEN SATURATION: 93 %

## 2024-01-25 DIAGNOSIS — J45.50 SEVERE PERSISTENT ASTHMA WITHOUT COMPLICATION: Primary | ICD-10-CM

## 2024-01-25 DIAGNOSIS — Z87.891 FORMER SMOKER: ICD-10-CM

## 2024-01-25 DIAGNOSIS — J96.11 CHRONIC RESPIRATORY FAILURE WITH HYPOXIA: ICD-10-CM

## 2024-01-25 RX ORDER — PREDNISONE 20 MG/1
40 TABLET ORAL DAILY
Qty: 10 TABLET | Refills: 0 | Status: SHIPPED | OUTPATIENT
Start: 2024-01-25 | End: 2024-01-30

## 2024-01-25 RX ORDER — AZITHROMYCIN 250 MG/1
TABLET, FILM COATED ORAL
Qty: 6 TABLET | Refills: 0 | Status: SHIPPED | OUTPATIENT
Start: 2024-01-25

## 2024-01-25 NOTE — PROGRESS NOTES
"Chief Complaint  Severe persistent asthma without complication (Needs a POC for a cruise)    Subjective        Josefina Cheng presents to [facility] PULMONARY & CRITICAL CARE MEDICINE  History of Present Illness    Mrs. Cheng presents today for follow up of severe asthma, chronic proximal respiratory failure, and former smoking history.  Had not required any ED visits since April up until this November  When she developed an acute respiratory infection/pneumonia.  She was initially treated by her PCP, but then went for ER evaluation due to development of pleuritic pain.  Imaging did suggest some initial tree-in-bud opacities, and several days later went back to the ER due to persistent symptoms.  Imaging during both visits confirm some changes primarily of the right lung.  She has noted some recovery and then started to develop upper respiratory symptoms again (not as severe). PCP prescribed additional antibiotic course and steroids to avoid worsening of the previous pneumonia.   She continues to use supplemental oxygen as needed (saturation noted between 91-93% while seated on room air today). Does benefit with times of extended exertion of when more short of breath (2 L). Did not receive the POC previously ordered, which would be beneficial during her travels.   Continues with all other maintenance therapy.       Objective   Vital Signs:  /72   Pulse 70   Temp 97.1 °F (36.2 °C)   Ht 158.8 cm (62.5\")   Wt 107 kg (236 lb)   SpO2 93%   BMI 42.48 kg/m²   Estimated body mass index is 42.48 kg/m² as calculated from the following:    Height as of this encounter: 158.8 cm (62.5\").    Weight as of this encounter: 107 kg (236 lb).         Physical Exam  Vitals reviewed.   Constitutional:       General: She is not in acute distress.     Appearance: She is well-developed. She is not diaphoretic.   HENT:      Head: Normocephalic and atraumatic.   Cardiovascular:      Rate and Rhythm: Normal rate " and regular rhythm.   Pulmonary:      Effort: Pulmonary effort is normal.      Breath sounds: No wheezing, rhonchi or rales.   Neurological:      Mental Status: She is alert and oriented to person, place, and time.   Psychiatric:         Behavior: Behavior normal.        Result Review :    The following data was reviewed by: Diane Vale PA-C on 01/25/2024:  Reviewed last EKG (report, QTC) from November 2023.   Reviewed last PFT result (2022).   Reviewed CT Chest imaging/report from   - 11/20/2023  - 11/17/2023  - 9/20/2023      Assessment and Plan     Diagnoses and all orders for this visit:    1. Severe persistent asthma without complication (Primary)  -     Tezepelumab-ekko (TEZSPIRE) injection 210 mg    2. Chronic respiratory failure with hypoxia  -     Oxygen Therapy    3. Former smoker    Other orders  -     predniSONE (DELTASONE) 20 MG tablet; Take 2 tablets by mouth Daily for 5 days.  Dispense: 10 tablet; Refill: 0  -     azithromycin (ZITHROMAX) 250 MG tablet; Take 2 by mouth today then 1 daily for 4 days  Dispense: 6 tablet; Refill: 0      Severe persistent asthma:   Continue albuterol inhaler as needed  Continue duonebs as needed  Continue Symbicort 160 mcg as scheduled  Previously added spiriva but minimal benefit noted, thus not continued.   Continue Tezspire injections as scheduled   Currently completing additional antibiotic/steroid   Minimal change of recent CT chests completed for acute illness in November (2023). Also notable for some left lower scarring atelectasis that was similar to the scan from September 2023.   Can consider follow up xray in the next 2-3 months (or at the next visit; can consider imaging sooner if needed).       Chronic hypoxic respiratory failure:   Continues to use and benefit from supplemental oxygen supplies as needed and at nighttime on 2 L  Titrates to room air when sitting as saturation typically maintains in the 90s.   Goal SPO2 of > 88%.   Reordered POC (not  previously received)  Urgent need - needs this before next Friday due to upcoming travel)      Former smoker:   Recent CT chest from November 2023 showed stable left lower scarring/atelectasis, and some new changes of the right lung from recent acute pneumonia.  No other obvious pulmonary nodules.  Should still qualify for low-dose CT screening until notable for over 15 years from cessation, currently between 10 to 12 years.  Notable for 28-year history with 2 pack/day average.   May consider follow-up x-ray imaging in the next 2 to 3 months.  Otherwise, can consider low-dose CT screening in 1 year, November 2024.      Follow Up     Return in about 4 months (around 5/25/2024), or if symptoms worsen or fail to improve, for Next scheduled follow up.  Patient was given instructions and counseling regarding her condition or for health maintenance advice. Please see specific information pulled into the AVS if appropriate.

## 2024-02-09 ENCOUNTER — HOSPITAL ENCOUNTER (EMERGENCY)
Facility: HOSPITAL | Age: 69
Discharge: HOME | End: 2024-02-09
Attending: EMERGENCY MEDICINE
Payer: OTHER GOVERNMENT

## 2024-02-09 ENCOUNTER — APPOINTMENT (OUTPATIENT)
Dept: GENERAL RADIOLOGY | Facility: HOSPITAL | Age: 69
End: 2024-02-09
Payer: OTHER GOVERNMENT

## 2024-02-09 ENCOUNTER — APPOINTMENT (OUTPATIENT)
Dept: GENERAL RADIOLOGY | Facility: HOSPITAL | Age: 69
End: 2024-02-09
Payer: MEDICARE

## 2024-02-09 VITALS
BODY MASS INDEX: 43.43 KG/M2 | RESPIRATION RATE: 18 BRPM | DIASTOLIC BLOOD PRESSURE: 64 MMHG | HEART RATE: 81 BPM | SYSTOLIC BLOOD PRESSURE: 137 MMHG | WEIGHT: 236 LBS | HEIGHT: 62 IN | OXYGEN SATURATION: 94 % | TEMPERATURE: 97.9 F

## 2024-02-09 DIAGNOSIS — M54.42 CHRONIC LEFT-SIDED LOW BACK PAIN WITH LEFT-SIDED SCIATICA: Primary | ICD-10-CM

## 2024-02-09 DIAGNOSIS — G89.29 CHRONIC LEFT-SIDED LOW BACK PAIN WITH LEFT-SIDED SCIATICA: Primary | ICD-10-CM

## 2024-02-09 DIAGNOSIS — B37.9 CANDIDIASIS: ICD-10-CM

## 2024-02-09 LAB
ALBUMIN SERPL-MCNC: 3.9 G/DL (ref 3.5–5.2)
ALBUMIN/GLOB SERPL: 1.4 G/DL
ALP SERPL-CCNC: 95 U/L (ref 39–117)
ALT SERPL W P-5'-P-CCNC: 11 U/L (ref 1–33)
ANION GAP SERPL CALCULATED.3IONS-SCNC: 10.3 MMOL/L (ref 5–15)
AST SERPL-CCNC: 13 U/L (ref 1–32)
BASOPHILS # BLD AUTO: 0.05 10*3/MM3 (ref 0–0.2)
BASOPHILS NFR BLD AUTO: 0.6 % (ref 0–1.5)
BILIRUB SERPL-MCNC: 0.6 MG/DL (ref 0–1.2)
BILIRUB UR QL STRIP: NEGATIVE
BUN SERPL-MCNC: 11 MG/DL (ref 8–23)
BUN/CREAT SERPL: 16.4 (ref 7–25)
CALCIUM SPEC-SCNC: 9.1 MG/DL (ref 8.6–10.5)
CHLORIDE SERPL-SCNC: 94 MMOL/L (ref 98–107)
CLARITY UR: CLEAR
CO2 SERPL-SCNC: 26.7 MMOL/L (ref 22–29)
COLOR UR: YELLOW
CREAT SERPL-MCNC: 0.67 MG/DL (ref 0.57–1)
DEPRECATED RDW RBC AUTO: 49.5 FL (ref 37–54)
EGFRCR SERPLBLD CKD-EPI 2021: 94.7 ML/MIN/1.73
EOSINOPHIL # BLD AUTO: 0.11 10*3/MM3 (ref 0–0.4)
EOSINOPHIL NFR BLD AUTO: 1.2 % (ref 0.3–6.2)
ERYTHROCYTE [DISTWIDTH] IN BLOOD BY AUTOMATED COUNT: 15 % (ref 12.3–15.4)
GLOBULIN UR ELPH-MCNC: 2.8 GM/DL
GLUCOSE SERPL-MCNC: 212 MG/DL (ref 65–99)
GLUCOSE UR STRIP-MCNC: ABNORMAL MG/DL
HCT VFR BLD AUTO: 45.4 % (ref 34–46.6)
HGB BLD-MCNC: 13.6 G/DL (ref 12–15.9)
HGB UR QL STRIP.AUTO: NEGATIVE
HOLD SPECIMEN: NORMAL
HOLD SPECIMEN: NORMAL
IMM GRANULOCYTES # BLD AUTO: 0.04 10*3/MM3 (ref 0–0.05)
IMM GRANULOCYTES NFR BLD AUTO: 0.4 % (ref 0–0.5)
KETONES UR QL STRIP: NEGATIVE
LEUKOCYTE ESTERASE UR QL STRIP.AUTO: NEGATIVE
LYMPHOCYTES # BLD AUTO: 1.98 10*3/MM3 (ref 0.7–3.1)
LYMPHOCYTES NFR BLD AUTO: 21.8 % (ref 19.6–45.3)
MCH RBC QN AUTO: 26.7 PG (ref 26.6–33)
MCHC RBC AUTO-ENTMCNC: 30 G/DL (ref 31.5–35.7)
MCV RBC AUTO: 89 FL (ref 79–97)
MONOCYTES # BLD AUTO: 0.43 10*3/MM3 (ref 0.1–0.9)
MONOCYTES NFR BLD AUTO: 4.7 % (ref 5–12)
NEUTROPHILS NFR BLD AUTO: 6.47 10*3/MM3 (ref 1.7–7)
NEUTROPHILS NFR BLD AUTO: 71.3 % (ref 42.7–76)
NITRITE UR QL STRIP: NEGATIVE
NRBC BLD AUTO-RTO: 0 /100 WBC (ref 0–0.2)
PH UR STRIP.AUTO: 6 [PH] (ref 5–8)
PLATELET # BLD AUTO: 326 10*3/MM3 (ref 140–450)
PMV BLD AUTO: 8.9 FL (ref 6–12)
POTASSIUM SERPL-SCNC: 4.7 MMOL/L (ref 3.5–5.2)
PROT SERPL-MCNC: 6.7 G/DL (ref 6–8.5)
PROT UR QL STRIP: NEGATIVE
RBC # BLD AUTO: 5.1 10*6/MM3 (ref 3.77–5.28)
SODIUM SERPL-SCNC: 131 MMOL/L (ref 136–145)
SP GR UR STRIP: 1.02 (ref 1–1.03)
UROBILINOGEN UR QL STRIP: ABNORMAL
WBC NRBC COR # BLD AUTO: 9.08 10*3/MM3 (ref 3.4–10.8)
WHOLE BLOOD HOLD COAG: NORMAL
WHOLE BLOOD HOLD SPECIMEN: NORMAL

## 2024-02-09 PROCEDURE — 80053 COMPREHEN METABOLIC PANEL: CPT | Performed by: PHYSICIAN ASSISTANT

## 2024-02-09 PROCEDURE — 96372 THER/PROPH/DIAG INJ SC/IM: CPT

## 2024-02-09 PROCEDURE — 25010000002 KETOROLAC TROMETHAMINE PER 15 MG: Performed by: PHYSICIAN ASSISTANT

## 2024-02-09 PROCEDURE — 81003 URINALYSIS AUTO W/O SCOPE: CPT | Performed by: PHYSICIAN ASSISTANT

## 2024-02-09 PROCEDURE — 25010000002 ORPHENADRINE CITRATE PER 60 MG: Performed by: PHYSICIAN ASSISTANT

## 2024-02-09 PROCEDURE — 72110 X-RAY EXAM L-2 SPINE 4/>VWS: CPT

## 2024-02-09 PROCEDURE — 73502 X-RAY EXAM HIP UNI 2-3 VIEWS: CPT | Performed by: RADIOLOGY

## 2024-02-09 PROCEDURE — 99283 EMERGENCY DEPT VISIT LOW MDM: CPT

## 2024-02-09 PROCEDURE — 73502 X-RAY EXAM HIP UNI 2-3 VIEWS: CPT

## 2024-02-09 PROCEDURE — 36415 COLL VENOUS BLD VENIPUNCTURE: CPT

## 2024-02-09 PROCEDURE — 85025 COMPLETE CBC W/AUTO DIFF WBC: CPT | Performed by: PHYSICIAN ASSISTANT

## 2024-02-09 PROCEDURE — 72110 X-RAY EXAM L-2 SPINE 4/>VWS: CPT | Performed by: RADIOLOGY

## 2024-02-09 PROCEDURE — 25010000002 DEXAMETHASONE SODIUM PHOSPHATE 10 MG/ML SOLUTION: Performed by: PHYSICIAN ASSISTANT

## 2024-02-09 RX ORDER — KETOROLAC TROMETHAMINE 30 MG/ML
30 INJECTION, SOLUTION INTRAMUSCULAR; INTRAVENOUS ONCE
Status: COMPLETED | OUTPATIENT
Start: 2024-02-09 | End: 2024-02-09

## 2024-02-09 RX ORDER — DEXAMETHASONE SODIUM PHOSPHATE 10 MG/ML
10 INJECTION, SOLUTION INTRAMUSCULAR; INTRAVENOUS ONCE
Status: COMPLETED | OUTPATIENT
Start: 2024-02-09 | End: 2024-02-09

## 2024-02-09 RX ORDER — TIZANIDINE HYDROCHLORIDE 4 MG/1
4 CAPSULE, GELATIN COATED ORAL 2 TIMES DAILY PRN
Qty: 12 CAPSULE | Refills: 0 | Status: SHIPPED | OUTPATIENT
Start: 2024-02-09

## 2024-02-09 RX ORDER — FLUCONAZOLE 100 MG/1
100 TABLET ORAL DAILY
Qty: 3 TABLET | Refills: 0 | Status: SHIPPED | OUTPATIENT
Start: 2024-02-09 | End: 2024-02-12

## 2024-02-09 RX ORDER — ORPHENADRINE CITRATE 30 MG/ML
60 INJECTION INTRAMUSCULAR; INTRAVENOUS ONCE
Status: COMPLETED | OUTPATIENT
Start: 2024-02-09 | End: 2024-02-09

## 2024-02-09 RX ADMIN — ORPHENADRINE CITRATE 60 MG: 60 INJECTION INTRAMUSCULAR; INTRAVENOUS at 13:46

## 2024-02-09 RX ADMIN — DEXAMETHASONE SODIUM PHOSPHATE 10 MG: 10 INJECTION INTRAMUSCULAR; INTRAVENOUS at 13:46

## 2024-02-09 RX ADMIN — KETOROLAC TROMETHAMINE 30 MG: 30 INJECTION, SOLUTION INTRAMUSCULAR; INTRAVENOUS at 13:46

## 2024-02-09 NOTE — ED PROVIDER NOTES
Subjective   History of Present Illness  68-year-old female patient with a past medical history of congestive heart failure, chronic bronchitis, diabetes, hyperlipidemia, and hypertension presents to the emergency room today with complaints of lower back pain radiating into the hip.  Patient is also reporting that she has had some vaginal itching and redness.  Patient states that she has been on 2 different rounds of antibiotics due to bronchitis as well as steroids.  Patient states that this symptoms started last week but she was going on a cruise so she did not get pain.  Patient states that she is still symptomatic.  Patient states that she has chronic back pain and she has had back surgery.  She denies any numbness tingling or incontinence.  Patient states that her back started hurting when she was on the cruise.  She denies any dysuria, hematuria, or flank pain.  She denies any fevers, chest pain, or shortness of breath.  Denies any nausea vomiting or diarrhea.    History provided by:  Patient   used: No        Review of Systems   Constitutional: Negative.    HENT: Negative.     Eyes: Negative.    Respiratory: Negative.     Cardiovascular: Negative.    Gastrointestinal: Negative.    Endocrine: Negative.    Musculoskeletal:  Positive for back pain.   Skin: Negative.    Allergic/Immunologic: Negative.    Neurological: Negative.    Hematological: Negative.    Psychiatric/Behavioral: Negative.     All other systems reviewed and are negative.      Past Medical History:   Diagnosis Date    Asthma 1978    CHF (congestive heart failure) 2021    Chronic bronchitis     Deep vein thrombosis 2015    Diabetes mellitus     Diverticulitis     Hyperlipidemia 1999    Hypertension        Allergies   Allergen Reactions    Adhesive Tape Rash    Cephalexin Hives    Cephalosporins Hives    Ciprofloxacin Hcl Hives    Penicillins Hives       Past Surgical History:   Procedure Laterality Date    ANKLE SURGERY       BACK SURGERY      CHOLECYSTECTOMY      FOOT SURGERY Left     GASTRIC STIMULATOR IMPLANT SURGERY  2017    changed battery 2022    HAND SURGERY      HERNIA REPAIR      HYSTERECTOMY      KNEE SURGERY Left     TUBAL ABDOMINAL LIGATION         Family History   Problem Relation Age of Onset    Osteoporosis Mother     Clotting disorder Mother     Cancer Father     Cancer Sister     Anemia Sister     Fainting Sister     Hypertension Sister     Asthma Sister     Asthma Brother     Asthma Maternal Grandfather     Heart attack Paternal Grandmother     Heart disease Paternal Grandmother        Social History     Socioeconomic History    Marital status:    Tobacco Use    Smoking status: Former     Packs/day: 2.00     Years: 28.00     Additional pack years: 0.00     Total pack years: 56.00     Types: Cigarettes     Quit date: 2011     Years since quittin.1     Passive exposure: Past    Smokeless tobacco: Never   Vaping Use    Vaping Use: Never used   Substance and Sexual Activity    Alcohol use: Never    Drug use: Never    Sexual activity: Not Currently     Partners: Male     Birth control/protection: Condom, Pill, Surgical, Tubal ligation, Birth control pill, Hysterectomy           Objective   Physical Exam  Vitals and nursing note reviewed.   Constitutional:       Appearance: Normal appearance. She is normal weight.   HENT:      Head: Normocephalic and atraumatic.      Right Ear: External ear normal.      Left Ear: External ear normal.      Nose: Nose normal.      Mouth/Throat:      Mouth: Mucous membranes are moist.      Pharynx: Oropharynx is clear.   Eyes:      Extraocular Movements: Extraocular movements intact.      Conjunctiva/sclera: Conjunctivae normal.      Pupils: Pupils are equal, round, and reactive to light.   Cardiovascular:      Rate and Rhythm: Normal rate and regular rhythm.      Pulses: Normal pulses.      Heart sounds: Normal heart sounds.   Pulmonary:      Effort: Pulmonary effort is  normal.      Breath sounds: Normal breath sounds.   Abdominal:      General: Abdomen is flat. Bowel sounds are normal.      Palpations: Abdomen is soft.   Musculoskeletal:         General: Normal range of motion.      Cervical back: Normal range of motion and neck supple.   Skin:     General: Skin is warm and dry.      Capillary Refill: Capillary refill takes less than 2 seconds.   Neurological:      General: No focal deficit present.      Mental Status: She is alert and oriented to person, place, and time. Mental status is at baseline.   Psychiatric:         Mood and Affect: Mood normal.         Behavior: Behavior normal.         Thought Content: Thought content normal.         Judgment: Judgment normal.         Procedures           ED Course  ED Course as of 02/09/24 1415   Fri Feb 09, 2024   1321 XR Hip With or Without Pelvis 2 - 3 View Left [ML]   1321 XR Spine Lumbar Complete 4+VW [ML]      ED Course User Index  [ML] Ginny An PA                                             Medical Decision Making  68-year-old female patient with a past medical history of congestive heart failure, chronic bronchitis, diabetes, hyperlipidemia, and hypertension presents to the emergency room today with complaints of lower back pain radiating into the hip.  Patient is also reporting that she has had some vaginal itching and redness.  Patient states that she has been on 2 different rounds of antibiotics due to bronchitis as well as steroids.  Patient states that this symptoms started last week but she was going on a cruise so she did not get pain.  Patient states that she is still symptomatic.  Patient states that she has chronic back pain and she has had back surgery.  She denies any numbness tingling or incontinence.  Patient states that her back started hurting when she was on the cruise.  She denies any dysuria, hematuria, or flank pain.  She denies any fevers, chest pain, or shortness of breath.  Denies any nausea  vomiting or diarrhea.  ED stay is been uncomplicated.  Patient will follow-up with her primary care provider on Monday.  Discussed symptoms and red flags with her that would warrant return to the emergency room.    Problems Addressed:  Candidiasis: complicated acute illness or injury  Chronic left-sided low back pain with left-sided sciatica: complicated acute illness or injury    Amount and/or Complexity of Data Reviewed  Labs: ordered.  Radiology: ordered. Decision-making details documented in ED Course.    Risk  Prescription drug management.        Final diagnoses:   Chronic left-sided low back pain with left-sided sciatica   Candidiasis       ED Disposition  ED Disposition       ED Disposition   Discharge    Condition   Stable    Comment   --               Nela Shaw MD  [address]  [address]  [place] [id number] [phone]    Schedule an appointment as soon as possible for a visit in 3 days           Medication List        New Prescriptions      fluconazole 100 MG tablet  Commonly known as: DIFLUCAN  Take 1 tablet by mouth Daily for 3 days.     TiZANidine 4 MG capsule  Commonly known as: Zanaflex  Take 1 capsule by mouth 2 (Two) Times a Day As Needed for Muscle Spasms.               Where to Get Your Medications        These medications were sent to [facility], [place] - [address] - [phone]  - [phone] FX  [address] [facility] [address]      Phone: [phone]   fluconazole 100 MG tablet  TiZANidine 4 MG capsule            Ginny An PA  02/09/24 4883

## 2024-02-22 ENCOUNTER — CLINICAL SUPPORT (OUTPATIENT)
Dept: PULMONOLOGY | Facility: CLINIC | Age: 69
End: 2024-02-22
Payer: OTHER GOVERNMENT

## 2024-02-22 DIAGNOSIS — J96.11 CHRONIC RESPIRATORY FAILURE WITH HYPOXIA: ICD-10-CM

## 2024-02-22 DIAGNOSIS — J45.50 SEVERE PERSISTENT ASTHMA WITHOUT COMPLICATION: Primary | ICD-10-CM

## 2024-02-22 PROBLEM — H26.9 CATARACT: Status: ACTIVE | Noted: 2024-02-22

## 2024-02-22 PROBLEM — I25.10 ATHSCL HEART DISEASE OF NATIVE CORONARY ARTERY W/O ANG PCTRS: Status: ACTIVE | Noted: 2022-05-16

## 2024-02-22 PROBLEM — J34.9 DISORDER OF PARANASAL SINUS: Status: ACTIVE | Noted: 2024-02-22

## 2024-02-22 PROBLEM — R93.89 ABNORMAL FINDINGS ON DIAGNOSTIC IMAGING OF OTHER SPECIFIED BODY STRUCTURES: Status: ACTIVE | Noted: 2018-04-12

## 2024-02-22 PROBLEM — Z86.19 HISTORY OF COLD SORES: Status: ACTIVE | Noted: 2024-02-22

## 2024-02-22 PROBLEM — I38 HEART VALVE DISEASE: Status: ACTIVE | Noted: 2023-06-05

## 2024-02-22 PROBLEM — D64.9 ANEMIA: Status: ACTIVE | Noted: 2018-05-07

## 2024-02-22 PROBLEM — E66.9 OBESITY: Status: ACTIVE | Noted: 2018-04-12

## 2024-02-22 PROBLEM — K62.3 RECTAL PROLAPSE: Status: ACTIVE | Noted: 2019-01-21

## 2024-02-22 PROBLEM — J30.2 SEASONAL ALLERGIC RHINITIS: Status: ACTIVE | Noted: 2024-02-22

## 2024-02-22 PROBLEM — K76.0 NONALCOHOLIC FATTY LIVER: Status: ACTIVE | Noted: 2018-02-27

## 2024-02-22 PROBLEM — Z79.51 LONG TERM (CURRENT) USE OF INHALED STEROIDS: Status: ACTIVE | Noted: 2022-05-16

## 2024-02-22 PROBLEM — I82.409 DEEP VEIN THROMBOSIS (DVT) OF LOWER EXTREMITY: Status: ACTIVE | Noted: 2024-02-22

## 2024-02-22 PROBLEM — R53.83 FATIGUE: Status: ACTIVE | Noted: 2017-06-28

## 2024-02-22 PROBLEM — R53.81 PHYSICAL DECONDITIONING: Status: ACTIVE | Noted: 2018-04-12

## 2024-02-22 PROBLEM — I50.9 HEART FAILURE, UNSPECIFIED: Status: ACTIVE | Noted: 2022-05-16

## 2024-02-22 PROBLEM — Z98.890 POST-OPERATIVE NAUSEA AND VOMITING: Status: ACTIVE | Noted: 2019-01-21

## 2024-02-22 PROBLEM — G47.33 OBSTRUCTIVE SLEEP APNEA SYNDROME: Status: ACTIVE | Noted: 2018-04-12

## 2024-02-22 PROBLEM — K57.92 DIVERTICULITIS: Status: ACTIVE | Noted: 2019-08-26

## 2024-02-22 PROBLEM — N63.0 MASS OF BREAST: Status: ACTIVE | Noted: 2024-02-22

## 2024-02-22 PROBLEM — M51.369 DEGENERATION OF INTERVERTEBRAL DISC OF LUMBAR REGION: Status: ACTIVE | Noted: 2024-02-22

## 2024-02-22 PROBLEM — K57.90 DIVERTICULAR DISEASE: Status: ACTIVE | Noted: 2019-08-26

## 2024-02-22 PROBLEM — R19.8 RECTAL TENESMUS: Status: ACTIVE | Noted: 2018-02-15

## 2024-02-22 PROBLEM — F41.9 ANXIETY: Status: ACTIVE | Noted: 2024-02-22

## 2024-02-22 PROBLEM — M51.36 DEGENERATION OF INTERVERTEBRAL DISC OF LUMBAR REGION: Status: ACTIVE | Noted: 2024-02-22

## 2024-02-22 PROBLEM — E66.01 MORBID (SEVERE) OBESITY DUE TO EXCESS CALORIES: Status: ACTIVE | Noted: 2018-04-12

## 2024-02-22 PROBLEM — I11.0 HYPERTENSIVE HEART DISEASE WITH HEART FAILURE: Status: ACTIVE | Noted: 2022-05-16

## 2024-02-22 PROBLEM — Z99.81 DEPENDENCE ON SUPPLEMENTAL OXYGEN: Status: ACTIVE | Noted: 2022-05-16

## 2024-02-22 PROBLEM — R11.2 POST-OPERATIVE NAUSEA AND VOMITING: Status: ACTIVE | Noted: 2019-01-21

## 2024-02-22 PROBLEM — J18.9 PNEUMONIA, UNSPECIFIED ORGANISM: Status: ACTIVE | Noted: 2022-05-09

## 2024-03-12 ENCOUNTER — OFFICE VISIT (OUTPATIENT)
Dept: CARDIOLOGY | Facility: CLINIC | Age: 69
End: 2024-03-12
Payer: OTHER GOVERNMENT

## 2024-03-12 ENCOUNTER — OFFICE VISIT (OUTPATIENT)
Dept: PULMONOLOGY | Facility: CLINIC | Age: 69
End: 2024-03-12
Payer: OTHER GOVERNMENT

## 2024-03-12 VITALS
HEIGHT: 62 IN | SYSTOLIC BLOOD PRESSURE: 118 MMHG | DIASTOLIC BLOOD PRESSURE: 66 MMHG | OXYGEN SATURATION: 91 % | WEIGHT: 245 LBS | HEART RATE: 66 BPM | BODY MASS INDEX: 45.08 KG/M2

## 2024-03-12 VITALS
TEMPERATURE: 96.9 F | WEIGHT: 243.2 LBS | DIASTOLIC BLOOD PRESSURE: 76 MMHG | OXYGEN SATURATION: 93 % | HEIGHT: 63 IN | HEART RATE: 76 BPM | SYSTOLIC BLOOD PRESSURE: 122 MMHG | BODY MASS INDEX: 43.09 KG/M2

## 2024-03-12 DIAGNOSIS — I10 PRIMARY HYPERTENSION: Primary | ICD-10-CM

## 2024-03-12 DIAGNOSIS — R00.2 PALPITATIONS: ICD-10-CM

## 2024-03-12 DIAGNOSIS — J96.11 CHRONIC RESPIRATORY FAILURE WITH HYPOXIA: ICD-10-CM

## 2024-03-12 DIAGNOSIS — Z87.891 FORMER SMOKER: ICD-10-CM

## 2024-03-12 DIAGNOSIS — R93.89 ABNORMAL CT OF THE CHEST: ICD-10-CM

## 2024-03-12 DIAGNOSIS — R91.1 PULMONARY NODULE: ICD-10-CM

## 2024-03-12 DIAGNOSIS — E78.5 DYSLIPIDEMIA: ICD-10-CM

## 2024-03-12 DIAGNOSIS — J45.50 SEVERE PERSISTENT ASTHMA WITHOUT COMPLICATION: Primary | ICD-10-CM

## 2024-03-12 PROBLEM — I11.0 HYPERTENSIVE HEART DISEASE WITH HEART FAILURE: Status: RESOLVED | Noted: 2022-05-16 | Resolved: 2024-03-12

## 2024-03-12 PROCEDURE — 99214 OFFICE O/P EST MOD 30 MIN: CPT | Performed by: PHYSICIAN ASSISTANT

## 2024-03-12 PROCEDURE — 99213 OFFICE O/P EST LOW 20 MIN: CPT | Performed by: PHYSICIAN ASSISTANT

## 2024-03-12 NOTE — PROGRESS NOTES
Nela Shaw MD  Josefina Amita Day  1955  03/12/2024    Patient Active Problem List   Diagnosis    Asthma    Former smoker    Primary hypertension    Chronic respiratory failure with hypoxia    Palpitations    Severe persistent asthma without complication    Heart valve disease    Dyslipidemia    Coronary atherosclerosis    Pneumonia, unspecified organism    Hematochezia    Hemorrhoid    Hypertriglyceridemia    Iron deficiency anemia    Irritable bowel syndrome    Left carotid artery stenosis    Mass of breast    Post-operative nausea and vomiting    Nonalcoholic fatty liver    Morbid (severe) obesity due to excess calories    Obstructive sleep apnea syndrome    Osteoarthritis    Physical deconditioning    Polyneuropathy    Preoperative examination, unspecified    Protein-calorie malnutrition    Rectal prolapse    Rectal tenesmus    Seasonal allergic rhinitis    Seasonal allergies    Shortness of breath    Tubular adenoma of colon    Type 2 diabetes mellitus    Ventral hernia    Vitamin D deficiency    Weight loss    Dependence on supplemental oxygen    History of cold sores    Diverticular disease    Diverticulitis    Diverticulitis of colon    Dizziness    Edema    Encounter for adjustment and management of neurostimulator    Fatigue    Rectal pain    Hiatal hernia    Gastroparesis    Heart failure, unspecified    Helicobacter pylori infection    Long term (current) use of inhaled steroids    Abnormal CT of the chest    Anemia    Anxiety    Atypical chest pain    Cataract    Chronic congestive heart failure    Chronic kidney disease    Deep vein thrombosis (DVT) of lower extremity    Degeneration of intervertebral disc of lumbar region    Diabetic gastroparesis associated with type 2 diabetes mellitus    Diarrhea    Diabetic nephropathy associated with type 2 diabetes mellitus    Disorder of paranasal sinus    Pulmonary nodule       Nela Marcelo MD:    Subjective     History of Present  Illness:    Chief Complaint   Patient presents with    Med Management     Verbal.        Josefina Cheng is a pleasant 69 y.o. female with a past medical history significant for ventricular tachycardia, sleep apnea, obesity, diabetes mellitus, tobacco abuse but did quit around 2010, essential hypertension, dyslipidemia. She comes in today for cardiology follow-up.     Josefina reports that she has been doing well. Patient denies any chest pain, shortness of breath is worsening from her baseline, palpitations, dizziness, syncope or near syncope. She admits she still has not used her CPAP for her sleep apnea and has no interest in it.  Thankfully, however, she reports she has lost roughly 46 pounds since she was last seen    Allergies   Allergen Reactions    Adhesive Tape Rash    Cephalexin Hives    Cephalosporins Hives    Ciprofloxacin Hcl Hives    Penicillins Hives   :      Current Outpatient Medications:     albuterol sulfate  (90 Base) MCG/ACT inhaler, Inhale 2 puffs Every 4 (Four) Hours As Needed for Wheezing or Shortness of Air., Disp: 18 g, Rfl: 8    ascorbic acid (VITAMIN C) 1000 MG tablet, Take 1 tablet by mouth Daily., Disp: , Rfl:     atorvastatin (LIPITOR) 20 MG tablet, Take 1 tablet by mouth Daily., Disp: , Rfl:     budesonide-formoterol (Symbicort) 160-4.5 MCG/ACT inhaler, Inhale 2 puffs 2 (Two) Times a Day., Disp: 1 each, Rfl: 12    calcium citrate-vitamin d (CALCITRATE) 315-250 MG-UNIT tablet tablet, Take  by mouth Daily., Disp: , Rfl:     cholecalciferol (VITAMIN D3) 1.25 MG (01244 UT) capsule, , Disp: , Rfl:     clopidogrel (PLAVIX) 75 MG tablet, Take 1 tablet by mouth Daily., Disp: 30 tablet, Rfl: 11    dapagliflozin Propanediol (Farxiga) 10 MG tablet, Take  by mouth Daily., Disp: , Rfl:     diphenhydrAMINE (BENADRYL) 25 mg capsule, Take 1 capsule by mouth Every 6 (Six) Hours As Needed for Itching., Disp: , Rfl:     fexofenadine (Allegra Allergy) 180 MG tablet, Take 1 tablet by mouth Daily.,  Disp: 30 tablet, Rfl: 5    guaiFENesin (MUCINEX) 600 MG 12 hr tablet, Take 1 tablet by mouth 2 (Two) Times a Day., Disp: 30 tablet, Rfl: 0    insulin aspart (novoLOG) 100 UNIT/ML injection, Inject  under the skin 3 (Three) Times a Day Before Meals., Disp: , Rfl:     ipratropium-albuterol (DUO-NEB) 0.5-2.5 mg/3 ml nebulizer, Take 3 mL by nebulization 4 (Four) Times a Day As Needed for Wheezing or Shortness of Air., Disp: 360 mL, Rfl: 8    isosorbide mononitrate (IMDUR) 60 MG 24 hr tablet, Take 1 tablet by mouth Daily., Disp: 90 tablet, Rfl: 3    lisinopril (PRINIVIL,ZESTRIL) 2.5 MG tablet, Take 1 tablet by mouth Daily. FOR BLOOD PRESSURE, Disp: , Rfl:     metFORMIN ER (GLUCOPHAGE-XR) 500 MG 24 hr tablet, TAKE TWO TABLETS BY MOUTH TWO TIMES A DAY FOR BLOOD SUGAR, Disp: , Rfl:     metoprolol tartrate (LOPRESSOR) 50 MG tablet, Take 1.5 tablets by mouth 2 (Two) Times a Day., Disp: 60 tablet, Rfl: 5    Multiple Vitamins-Minerals (ZINC PO), Take  by mouth., Disp: , Rfl:     nitroglycerin (NITROSTAT) 0.4 MG SL tablet, 1 under the tongue as needed for angina, may repeat q5mins for up three doses, Disp: 25 tablet, Rfl: 2    omeprazole (priLOSEC) 40 MG capsule, , Disp: , Rfl:     ondansetron (ZOFRAN) 4 MG tablet, Every 12 (Twelve) Hours., Disp: , Rfl:     promethazine (PHENERGAN) 25 MG tablet, Take 1 tablet by mouth Every 6 (Six) Hours As Needed for Nausea or Vomiting., Disp: , Rfl:     Tezspire 210 MG/1.91ML injection, INJECT 1 SYRINGE UNDER THE SKIN EVERY 4 WEEKS., Disp: 2 each, Rfl: 4    TiZANidine (Zanaflex) 4 MG capsule, Take 1 capsule by mouth 2 (Two) Times a Day As Needed for Muscle Spasms., Disp: 12 capsule, Rfl: 0    Toujeo Max SoloStar 300 UNIT/ML solution pen-injector injection, INJECT 54 UNITS UNDER THE SKIN IN THE MORNING AND 54 UNITS IN THE EVENING, Disp: , Rfl:     venlafaxine (EFFEXOR) 75 MG tablet, Take 1 tablet by mouth 2 (Two) Times a Day., Disp: , Rfl:     The following portions of the patient's history  "were reviewed and updated as appropriate: allergies, current medications, past family history, past medical history, past social history, past surgical history and problem list.    Social History     Tobacco Use    Smoking status: Former     Current packs/day: 0.00     Average packs/day: 2.0 packs/day for 28.0 years (56.0 ttl pk-yrs)     Types: Cigarettes     Start date: 1983     Quit date: 2011     Years since quittin.2     Passive exposure: Past    Smokeless tobacco: Never   Vaping Use    Vaping status: Never Used   Substance Use Topics    Alcohol use: Never    Drug use: Never         Objective   Vitals:    24 1259   BP: 118/66   BP Location: Left arm   Patient Position: Sitting   Cuff Size: Adult   Pulse: 66   SpO2: 91%   Weight: 111 kg (245 lb)   Height: 157.5 cm (62\")     Body mass index is 44.81 kg/m².    ROS    Constitutional:       General: Not in acute distress.     Appearance: Healthy appearance. Well-developed and not in distress. Not diaphoretic.   Eyes:      Conjunctiva/sclera: Conjunctivae normal.      Pupils: Pupils are equal, round, and reactive to light.   HENT:      Head: Normocephalic and atraumatic.   Neck:      Vascular: No carotid bruit or JVD.   Pulmonary:      Effort: Pulmonary effort is normal. No respiratory distress.      Breath sounds: Normal breath sounds.   Cardiovascular:      Normal rate. Regular rhythm.   Edema:     Peripheral edema absent.   Skin:     General: Skin is cool.   Neurological:      Mental Status: Alert, oriented to person, place, and time and oriented to person, place and time.         Lab Results   Component Value Date     (L) 2024    K 4.7 2024    CL 94 (L) 2024    CO2 26.7 2024    BUN 11 2024    CREATININE 0.67 2024    GLUCOSE 212 (H) 2024    CALCIUM 9.1 2024    AST 13 2024    ALT 11 2024    ALKPHOS 95 2024     No results found for: \"CKTOTAL\"  Lab Results   Component Value Date " "   WBC 9.08 02/09/2024    HGB 13.6 02/09/2024    HCT 45.4 02/09/2024     02/09/2024     Lab Results   Component Value Date    INR 0.95 02/28/2024    INR 1.01 08/11/2022     Lab Results   Component Value Date    MG 1.8 02/28/2024     No results found for: \"TSH\", \"PSA\", \"CHLPL\", \"TRIG\", \"HDL\", \"LDL\"   No results found for: \"BNP\"    During this visit the following were done:  Labs Reviewed []    Labs Ordered []    Radiology Reports Reviewed []    Radiology Ordered []    PCP Records Reviewed []    Referring Provider Records Reviewed []    ER Records Reviewed []    Hospital Records Reviewed []    History Obtained From Family []    Radiology Images Reviewed []    Other Reviewed []    Records Requested []       Procedures    Assessment & Plan    Diagnosis Plan   1. Primary hypertension        2. Palpitations        3. Dyslipidemia                 Recommendations:  Wide-complex tachycardia  Likely being made worse from untreated sleep apnea although she has lost weight I encouraged her to continue healthy lifestyle if she continues to lose weight I suspect her sleep apnea would also improve.  Currently denies any palpitations or active cardiac concerns.  Essential hypertension  Currently well-controlled.  Dyslipidemia  Currently controlled we will request recent labs from PCP including lipid panel    Return in about 9 months (around 12/12/2024).    As always, I appreciate very much the opportunity to participate in the cardiovascular care of your patients.      With Best Regards,    Jay Jones PA-C          "

## 2024-03-12 NOTE — PROGRESS NOTES
"Chief Complaint  Lung Nodule (new left lower lobe 4 mm pulmonary nodule)    Subjective        Josefina Cheng presents to [facility] PULMONARY & CRITICAL CARE MEDICINE  History of Present Illness    Mrs. Cheng presents today for follow up. She has been noted for a few flare ups/pneumonia within the last few months. Notable for ED visits (2) in November, then ED visit in early and late February.   During this recent episode, she was diagnosed with pleurisy as she was having increased left sided back pain. Imaging was incidentally notable for a 4 mm left lower nodule, and recommended for follow up on this. She still has this back pain on occasion, but has noted interval improvement since that ED visit. Noted increased gray colored sputum but no hemoptysis. Otherwise notes this on occasion at baseline.   She continues with current maintenance therapies. Tolerating room air today.         Objective   Vital Signs:  /76   Pulse 76   Temp 96.9 °F (36.1 °C)   Ht 158.8 cm (62.5\")   Wt 110 kg (243 lb 3.2 oz)   SpO2 93%   BMI 43.77 kg/m²   Estimated body mass index is 43.77 kg/m² as calculated from the following:    Height as of this encounter: 158.8 cm (62.5\").    Weight as of this encounter: 110 kg (243 lb 3.2 oz).         Physical Exam  Vitals reviewed.   Constitutional:       General: She is not in acute distress.     Appearance: She is well-developed. She is not diaphoretic.   HENT:      Head: Normocephalic and atraumatic.   Cardiovascular:      Rate and Rhythm: Normal rate and regular rhythm.   Pulmonary:      Effort: Pulmonary effort is normal.      Breath sounds: No wheezing, rhonchi or rales.   Neurological:      Mental Status: She is alert and oriented to person, place, and time.   Psychiatric:         Behavior: Behavior normal.        Result Review :    The following data was reviewed by: Diane Vale PA-C on 03/12/2024:  CT chest report only (February 2024)    CT chest imaging/report " November 17, 2023     CT chest imaging/report November 22 2023  Not mentioned were new two left basilar opacities, around 9 and 6 mm per personal measurement (not on the exam 5 days prior) Images 76, 80         PFT 2022  Echo 2023        Assessment and Plan     Diagnoses and all orders for this visit:    1. Severe persistent asthma without complication (Primary)    2. Chronic respiratory failure with hypoxia    3. Former smoker    4. Pulmonary nodule    5. Abnormal CT of the chest        Severe persistent asthma:   Continue albuterol as needed  Contineu duonebs as needed  Continue Symbicort 160 mcg as scheduled  Did not significantly benefit from spiriva, awaited  Continue Tezspire as scheduled.       Chronic hypoxic respiratory failure:   Still has supplies for as needed use and nocturnal use on 2 L.   Goal SPO2 of > 88%.   Tolerating room air today with saturation appropriate.       Abnormal CT chest, Pulmonary nodule, Former smoker:   Cessation noted around 10-12 years ago, with 28 year history of 2 pack per day average.   Last CT imaging in November 2023 was notable for stable right lower linear scarring/atelectasis, with some interval ground glass changes of the right side, and new left lower opacities (6 and 9 mm) on comparison from November 17th to **. Visible on imaging above.   Newest imaging report from February 2024 mentioned left sided 4 mm nodule (no mention of right sided findings). ? New opacity or old from one of the previous 6 or 9 mm opacities.   Will obtain repeat CT chest in 3 months for assessment of this new nodule (versus old) as well as 6 month follow up of the November 2023 abnormalities.   Will try to request disc copy of last CT chest ([facility]).         Follow Up     Return in about 4 months (around 7/12/2024), or if symptoms worsen or fail to improve, for Next scheduled follow up.  Patient was given instructions and counseling regarding her condition or for health maintenance advice.  Please see specific information pulled into the AVS if appropriate.

## 2024-03-13 ENCOUNTER — TELEPHONE (OUTPATIENT)
Dept: CARDIOLOGY | Facility: CLINIC | Age: 69
End: 2024-03-13
Payer: OTHER GOVERNMENT

## 2024-03-13 NOTE — TELEPHONE ENCOUNTER
----- Message from Jay Jones PA-C sent at 3/12/2024  2:02 PM EDT -----  Can we get lipid panel from pcp

## 2024-03-21 ENCOUNTER — CLINICAL SUPPORT (OUTPATIENT)
Dept: PULMONOLOGY | Facility: CLINIC | Age: 69
End: 2024-03-21
Payer: OTHER GOVERNMENT

## 2024-03-21 DIAGNOSIS — J45.50 SEVERE PERSISTENT ASTHMA WITHOUT COMPLICATION: Primary | ICD-10-CM

## 2024-04-12 ENCOUNTER — DOCUMENTATION (OUTPATIENT)
Dept: PULMONOLOGY | Facility: CLINIC | Age: 69
End: 2024-04-12
Payer: OTHER GOVERNMENT

## 2024-04-12 NOTE — PROGRESS NOTES
Completed forms today for Tezspire continuation.   Started in 2022 due to Severe asthma uncontrolled by other maintenance therapy options.   She noted significant symptom improvement of baseline symptoms after first 2 doses.     Was notable for COVID-19 infection requiring ED in April 2023.   Then pneumonia development requiring ED visits x2 in November 2023.   Otherwise, continues to note much benefit at baseline with continuation of maintenance inhaled medications.

## 2024-04-17 RX ORDER — TEZEPELUMAB-EKKO 210 MG/1.9ML
INJECTION, SOLUTION SUBCUTANEOUS
Qty: 2 EACH | Refills: 4 | Status: SHIPPED | OUTPATIENT
Start: 2024-04-17

## 2024-04-18 ENCOUNTER — CLINICAL SUPPORT (OUTPATIENT)
Dept: PULMONOLOGY | Facility: CLINIC | Age: 69
End: 2024-04-18
Payer: COMMERCIAL

## 2024-04-18 DIAGNOSIS — J45.50 SEVERE PERSISTENT ASTHMA WITHOUT COMPLICATION: Primary | ICD-10-CM

## 2024-04-19 DIAGNOSIS — R00.2 PALPITATIONS: ICD-10-CM

## 2024-04-19 RX ORDER — METOPROLOL TARTRATE 50 MG/1
75 TABLET, FILM COATED ORAL 2 TIMES DAILY
Qty: 60 TABLET | Refills: 5 | Status: SHIPPED | OUTPATIENT
Start: 2024-04-19

## 2024-04-19 NOTE — TELEPHONE ENCOUNTER
Caller: Skylar Josefina Amita    Relationship: Self    Best call back number: [phone]    Requested Prescriptions:   Requested Prescriptions     Pending Prescriptions Disp Refills    metoprolol tartrate (LOPRESSOR) 50 MG tablet 60 tablet 5     Sig: Take 1.5 tablets by mouth 2 (Two) Times a Day.        Pharmacy where request should be sent: [facility] [address].  [phone]  - [phone]      Last office visit with prescribing clinician: 9/12/2023   Last telemedicine visit with prescribing clinician: Visit date not found   Next office visit with prescribing clinician: Visit date not found       Does the patient have less than a 3 day supply:  [x] Yes  [] No    Would you like a call back once the refill request has been completed: [] Yes [x] No    If the office needs to give you a call back, can they leave a voicemail: [] Yes [x] No    Rgio Alejandra Rep   04/19/24 12:30 EDT

## 2024-05-16 ENCOUNTER — CLINICAL SUPPORT (OUTPATIENT)
Dept: PULMONOLOGY | Facility: CLINIC | Age: 69
End: 2024-05-16
Payer: COMMERCIAL

## 2024-05-16 DIAGNOSIS — J45.50 SEVERE PERSISTENT ASTHMA WITHOUT COMPLICATION: Primary | ICD-10-CM

## 2024-06-04 ENCOUNTER — DOCUMENTATION (OUTPATIENT)
Dept: PULMONOLOGY | Facility: CLINIC | Age: 69
End: 2024-06-04
Payer: COMMERCIAL

## 2024-06-04 NOTE — PROGRESS NOTES
Follow up CT for 3 month (May/June) was cancelled.   Imaging for last report from February 2024 was obtained.     Per comparison with November 2023, the left lower opacity still persists but does show some slight improvement.   General improvement of other opacities noted as well.   Can consider repeat CT around 12 months, or sooner as needed.

## 2024-06-13 ENCOUNTER — CLINICAL SUPPORT (OUTPATIENT)
Dept: PULMONOLOGY | Facility: CLINIC | Age: 69
End: 2024-06-13
Payer: COMMERCIAL

## 2024-06-13 DIAGNOSIS — J45.50 SEVERE PERSISTENT ASTHMA WITHOUT COMPLICATION: Primary | ICD-10-CM

## 2024-07-15 ENCOUNTER — OFFICE VISIT (OUTPATIENT)
Dept: PULMONOLOGY | Facility: CLINIC | Age: 69
End: 2024-07-15
Payer: COMMERCIAL

## 2024-07-15 ENCOUNTER — CLINICAL SUPPORT (OUTPATIENT)
Dept: PULMONOLOGY | Facility: CLINIC | Age: 69
End: 2024-07-15
Payer: COMMERCIAL

## 2024-07-15 VITALS
TEMPERATURE: 97.8 F | HEART RATE: 98 BPM | BODY MASS INDEX: 45.08 KG/M2 | HEIGHT: 62 IN | DIASTOLIC BLOOD PRESSURE: 78 MMHG | SYSTOLIC BLOOD PRESSURE: 128 MMHG | OXYGEN SATURATION: 89 % | WEIGHT: 245 LBS

## 2024-07-15 DIAGNOSIS — J45.50 SEVERE PERSISTENT ASTHMA WITHOUT COMPLICATION: Primary | ICD-10-CM

## 2024-07-15 DIAGNOSIS — Z53.21 PATIENT LEFT WITHOUT BEING SEEN: ICD-10-CM

## 2024-07-15 DIAGNOSIS — J45.50 SEVERE PERSISTENT ASTHMA, UNSPECIFIED WHETHER COMPLICATED: Primary | ICD-10-CM

## 2024-07-15 NOTE — PROGRESS NOTES
"Chief Complaint  Severe persistent asthma without complication    Subjective    {Problem List  Visit Diagnosis   Encounters  Notes  Medications  Labs  Result Review Imaging  Media :23}    Josefina Cheng presents to [facility] PULMONARY & CRITICAL CARE MEDICINE  History of Present Illness    ***      Objective   Vital Signs:  /78   Pulse 98   Temp 97.8 °F (36.6 °C)   Ht 157.5 cm (62\")   Wt 111 kg (245 lb)   SpO2 (!) 89%   BMI 44.81 kg/m²   Estimated body mass index is 44.81 kg/m² as calculated from the following:    Height as of this encounter: 157.5 cm (62\").    Weight as of this encounter: 111 kg (245 lb).         Physical Exam   Result Review :{Labs  Result Review  Imaging  Med Tab  Media  Procedures :23}    The following data was reviewed by: Diane Vale PA-C on 07/15/2024:    ***        Assessment and Plan {CC Problem List  Visit Diagnosis   ROS  Review (Popup)  Health Maintenance  Quality  BestPractice  Medications  SmartSets  SnapShot Encounters  Media :23}    Diagnoses and all orders for this visit:    1. Severe persistent asthma without complication (Primary)  -     Tezepelumab-ekko (TEZSPIRE) injection 210 mg        Severe persistent asthma:   Continue albuterol inhaler as needed  Continue DuoNebs as needed  Continue Symbicort 160 mcg scheduled  Did not significantly benefit from Spiriva in the past  Continues to Spiriva scheduled         Chronic hypoxic respiratory failure:   Has supplies available for as needed and nocturnal use.  Typically able to tolerate room air and nocturnal use on 2 L.  However, saturation borderline today 89%***  ***         Abnormal CT chest, Pulmonary nodule, Former smoker:   Smoking cessation noted around 10-12*** years ago, with 28 year history of 2 pack per day average.   Last CT imaging in November 2023 was notable for stable right lower linear scarring/atelectasis, with some interval ground glass changes of the " right side, and new left lower opacities (6 and 9 mm) on comparison from November 17th to ***. Visible on imaging above.   Newest imaging report from February 2024 mentioned left sided 4 mm nodule (no mention of right sided findings). ? New opacity or old from one of the previous 6 or 9 mm opacities.   Will obtain repeat CT chest in 3 months for assessment of this new nodule (versus old) as well as 6 month follow up of the November 2023 abnormalities.   Will try to request disc copy of last CT chest ([facility]).           Follow Up {Instructions Charge Capture  Follow-up Communications :23}    No follow-ups on file.  Patient was given instructions and counseling regarding her condition or for health maintenance advice. Please see specific information pulled into the AVS if appropriate.

## 2024-08-26 ENCOUNTER — OFFICE VISIT (OUTPATIENT)
Dept: PULMONOLOGY | Facility: CLINIC | Age: 69
End: 2024-08-26
Payer: COMMERCIAL

## 2024-08-26 VITALS
SYSTOLIC BLOOD PRESSURE: 124 MMHG | BODY MASS INDEX: 43.98 KG/M2 | HEIGHT: 62 IN | OXYGEN SATURATION: 97 % | WEIGHT: 239 LBS | HEART RATE: 64 BPM | DIASTOLIC BLOOD PRESSURE: 72 MMHG | TEMPERATURE: 97.5 F

## 2024-08-26 DIAGNOSIS — J96.11 CHRONIC RESPIRATORY FAILURE WITH HYPOXIA: ICD-10-CM

## 2024-08-26 DIAGNOSIS — R91.1 PULMONARY NODULE: ICD-10-CM

## 2024-08-26 DIAGNOSIS — J45.50 SEVERE PERSISTENT ASTHMA, UNSPECIFIED WHETHER COMPLICATED: Primary | ICD-10-CM

## 2024-08-26 DIAGNOSIS — Z87.891 FORMER SMOKER: ICD-10-CM

## 2024-08-26 PROCEDURE — 96372 THER/PROPH/DIAG INJ SC/IM: CPT | Performed by: PHYSICIAN ASSISTANT

## 2024-08-26 PROCEDURE — 99214 OFFICE O/P EST MOD 30 MIN: CPT | Performed by: PHYSICIAN ASSISTANT

## 2024-08-26 RX ORDER — ATORVASTATIN CALCIUM 40 MG/1
40 TABLET, FILM COATED ORAL
COMMUNITY
Start: 2024-07-27

## 2024-08-26 RX ORDER — ERGOCALCIFEROL 1.25 MG/1
CAPSULE, LIQUID FILLED ORAL
COMMUNITY
Start: 2024-08-13

## 2024-08-26 NOTE — PROGRESS NOTES
"Chief Complaint  Severe persistent asthma without complication    Subjective        Josefina Cheng presents to [facility] PULMONARY & CRITICAL CARE MEDICINE  History of Present Illness    Mrs. Cheng presents today for follow up. Symptoms remain stable at this time. Continues with tezspire for additional maintenance of asthma.   Still has oxygen supplies but rarely requires daytime use.   Was able to enjoy some recent traveling on a cruise. Was able to tolerate increased walking distances with assistance from a walker.       Objective   Vital Signs:  /72   Pulse 64   Temp 97.5 °F (36.4 °C)   Ht 157.5 cm (62\")   Wt 108 kg (239 lb)   SpO2 97%   BMI 43.71 kg/m²   Estimated body mass index is 43.71 kg/m² as calculated from the following:    Height as of this encounter: 157.5 cm (62\").    Weight as of this encounter: 108 kg (239 lb).      Physical Exam  Vitals reviewed.   Constitutional:       General: She is not in acute distress.     Appearance: She is not diaphoretic.   HENT:      Head: Normocephalic and atraumatic.   Cardiovascular:      Rate and Rhythm: Normal rate and regular rhythm.   Pulmonary:      Effort: Pulmonary effort is normal.      Breath sounds: No wheezing, rhonchi or rales.   Neurological:      Mental Status: She is alert and oriented to person, place, and time.   Psychiatric:         Behavior: Behavior normal.            Result Review :  The following data was reviewed by: Diane Vale PA-C on 08/26/2024:    Chest imaging/report February 2024  (Per personal review, detailed below)  -----------------------------------------------------------------------------------    CT chest imaging/report November 22, 2023    -----------------------------------------------------------------------------------    Chest imaging/report November 17, 2023      -----------------------------------------------------------------------------------    PFT 2022        Assessment and Plan "   Diagnoses and all orders for this visit:    1. Severe persistent asthma, unspecified whether complicated (Primary)  -     Tezepelumab-ekko (TEZSPIRE) injection 210 mg    2. Chronic respiratory failure with hypoxia    3. Former smoker    4. Pulmonary nodule          Severe persistent asthma:   Continue albuterol as needed  Continue duonebs as needed  Continue Symbicort 160 mcg as scheduled  Spiriva previously tried, not effective.   Other LAMA agents awaited at this time  Continue Tezspire as scheduled - does provide symptom maintenance.   Receive dose in office today  May consider new PFT/spirometry at the next visit         Chronic hypoxic respiratory failure:   continue oxygen at nighttime and during daytime PRN on 2 L  Goal SPO2 of > 88%          Pulmonary nodule, Former smoker:   Smoking cessation achieved around 10-12 years ago, with 28 year history and 2 ppd average.   CT imaging in November 2023 notable for stable right lower linear scarring/atelectasis, with some interval ground glass changes of the right side and new left lower opacities/atelectasis (6 mm and 9 mm)  Last CT imaging in November 2023 was notable for stable right lower linear scarring/atelectasis, with some interval ground glass changes of the right side, and new left lower opacities (6 and 9 mm) on the 11/22/2023 imaging/  Obtained report initially, then imaging from February 2024  - interval resolution of ground glass changes  - partial improvement/thinning of the linear scarring of the right lung  - left lower 6 mm opacity resolved  - left lower 9 mm opacity now 5 x 6 mm (but appears more like scarring/atelectasis)  - new 3-4 mm left lower ground glass nodule, image 35  Will consider 12 month follow-up with LDCT around February 2025.           Follow Up   Return in about 6 months (around 2/26/2025), or if symptoms worsen or fail to improve, for Next scheduled follow up.  Patient was given instructions and counseling regarding her  condition or for health maintenance advice. Please see specific information pulled into the AVS if appropriate.

## 2024-09-23 ENCOUNTER — CLINICAL SUPPORT (OUTPATIENT)
Dept: PULMONOLOGY | Facility: CLINIC | Age: 69
End: 2024-09-23
Payer: COMMERCIAL

## 2024-09-23 DIAGNOSIS — J45.50 SEVERE PERSISTENT ASTHMA, UNSPECIFIED WHETHER COMPLICATED: Primary | ICD-10-CM

## 2024-09-23 PROCEDURE — 96372 THER/PROPH/DIAG INJ SC/IM: CPT | Performed by: PHYSICIAN ASSISTANT

## 2024-09-28 DIAGNOSIS — R00.2 PALPITATIONS: ICD-10-CM

## 2024-10-01 RX ORDER — METOPROLOL TARTRATE 50 MG
TABLET ORAL
Qty: 60 TABLET | Refills: 5 | Status: SHIPPED | OUTPATIENT
Start: 2024-10-01

## 2024-10-02 DIAGNOSIS — R00.2 PALPITATIONS: ICD-10-CM

## 2024-10-02 RX ORDER — METOPROLOL TARTRATE 50 MG
TABLET ORAL
Qty: 60 TABLET | Refills: 0 | OUTPATIENT
Start: 2024-10-02

## 2024-10-06 DIAGNOSIS — R00.2 PALPITATIONS: ICD-10-CM

## 2024-10-07 RX ORDER — ISOSORBIDE MONONITRATE 60 MG/1
TABLET, EXTENDED RELEASE ORAL
Qty: 90 TABLET | Refills: 0 | Status: SHIPPED | OUTPATIENT
Start: 2024-10-07

## 2024-10-14 DIAGNOSIS — R00.2 PALPITATIONS: ICD-10-CM

## 2024-10-15 RX ORDER — ISOSORBIDE MONONITRATE 60 MG/1
TABLET, EXTENDED RELEASE ORAL
Qty: 90 TABLET | Refills: 0 | Status: SHIPPED | OUTPATIENT
Start: 2024-10-15

## 2024-10-21 ENCOUNTER — CLINICAL SUPPORT (OUTPATIENT)
Dept: PULMONOLOGY | Facility: CLINIC | Age: 69
End: 2024-10-21
Payer: COMMERCIAL

## 2024-10-21 DIAGNOSIS — J45.50 SEVERE PERSISTENT ASTHMA, UNSPECIFIED WHETHER COMPLICATED: Primary | ICD-10-CM

## 2024-10-21 PROCEDURE — 96372 THER/PROPH/DIAG INJ SC/IM: CPT | Performed by: PHYSICIAN ASSISTANT

## 2024-11-15 ENCOUNTER — TELEPHONE (OUTPATIENT)
Dept: PHARMACY | Facility: HOSPITAL | Age: 69
End: 2024-11-15

## 2024-11-18 ENCOUNTER — CLINICAL SUPPORT (OUTPATIENT)
Dept: PULMONOLOGY | Facility: CLINIC | Age: 69
End: 2024-11-18
Payer: COMMERCIAL

## 2024-11-18 DIAGNOSIS — J45.50 SEVERE PERSISTENT ASTHMA, UNSPECIFIED WHETHER COMPLICATED: Primary | ICD-10-CM

## 2024-11-18 PROCEDURE — 96372 THER/PROPH/DIAG INJ SC/IM: CPT | Performed by: PHYSICIAN ASSISTANT

## 2024-11-18 NOTE — PATIENT INSTRUCTIONS
Heart Failure  Heart failure is a condition in which the heart has trouble pumping blood. This means your heart does not pump blood efficiently for your body to work well. In some cases of heart failure, fluid may back up into your lungs or you may have swelling (edema) in your lower legs. Heart failure is usually a long-term (chronic) condition. It is important for you to take good care of yourself and follow your health care provider's treatment plan.    How to cope with Congestive Heart Failure:  Congestive Heart Failure (CHF) is not an unchanging condition.  Heart Failure may deteriorate for a variety of reasons.  For instance:  excessive salt or fluid intake, illness such as flu or pneumonia, cardiac arrhythmias, and heart attack all may worsen heart failure.  Sometimes the patient with heart failure worsens for no apparent reason.  The educated patient must know how to anticipate deterioration, and to know how to react to it in order to correct the deterioration before it becomes serious.  Just as when steering a car, the heart failure patient must adjust to changes in their condition in order to stay on course.  A little too wet and they become congested and short of breathe.  A little too dry and they become weak, fatigued and dizzy.    When your provider examines your neck, he/she is looking at your veins to assess how much fluid is in the circulatory system.  You can also help monitor your fluid status by paying attention to your condition,(particularly how you feel), by noting how much swelling is present at the ankles and monitoring your body weight daily. A little bit of swelling of the ankles at the end of the day is normal and indicates sufficient fluid in the circulatory system to allow a weakened heart to pump normally.  More a than a trace of swelling at the ankles indicates fluid excess.  This fluid may re-enter the central circulation when you lie down, awakening you with shortness of breath or  forcing you to sleep on several pillows for comfort.  Similarly if you weight goes up by more than 2-3 pounds in 1-2 days or by 5 pounds over a week, the body may be retaining too much fluid and worsening heart failure may ensue.  CAUSES   Some health conditions can cause heart failure. Those health conditions include:  High blood pressure (hypertension). Hypertension causes the heart muscle to work harder than normal. When pressure in the blood vessels is high, the heart needs to pump (contract) with more force in order to circulate blood throughout the body. High blood pressure eventually causes the heart to become stiff and weak.  Coronary artery disease (CAD). CAD is the buildup of cholesterol and fat (plaque) in the arteries of the heart. The blockage in the arteries deprives the heart muscle of oxygen and blood. This can cause chest pain and may lead to a heart attack. High blood pressure can also contribute to CAD.  Heart attack (myocardial infarction). A heart attack occurs when one or more arteries in the heart become blocked. The loss of oxygen damages the muscle tissue of the heart. When this happens, part of the heart muscle dies. The injured tissue does not contract as well and weakens the heart's ability to pump blood.  Abnormal heart valves. When the heart valves do not open and close properly, it can cause heart failure. This makes the heart muscle pump harder to keep the blood flowing.  Heart muscle disease (cardiomyopathy or myocarditis). Heart muscle disease is damage to the heart muscle from a variety of causes. These can include drug or alcohol abuse, infections, or unknown reasons. These can increase the risk of heart failure.  Lung disease. Lung disease makes the heart work harder because the lungs do not work properly. This can cause a strain on the heart, leading it to fail.  Diabetes. Diabetes increases the risk of heart failure. High blood sugar contributes to high fat (lipid) levels in  the blood. Diabetes can also cause slow damage to tiny blood vessels that carry important nutrients to the heart muscle. When the heart does not get enough oxygen and food, it can cause the heart to become weak and stiff. This leads to a heart that does not contract efficiently.  Other conditions can contribute to heart failure. These include abnormal heart rhythms, thyroid problems, and low blood counts (anemia).  Certain unhealthy behaviors can increase the risk of heart failure, including:  Being overweight.  Smoking or chewing tobacco.  Eating foods high in fat and cholesterol.  Abusing illicit drugs or alcohol.  Lacking physical activity.  SYMPTOMS   Heart failure symptoms may vary and can be hard to detect. Symptoms may include:  Shortness of breath with activity, such as climbing stairs.  Persistent cough.  Swelling of the feet, ankles, legs, or abdomen.  Unexplained weight gain.  Difficulty breathing when lying flat (orthopnea).  Waking from sleep because of the need to sit up and get more air.  Rapid heartbeat.  Fatigue and loss of energy.  Feeling light-headed, dizzy, or close to fainting.  Loss of appetite.  Nausea.  Increased urination during the night (nocturia).      DIAGNOSIS   A diagnosis of heart failure is based on your history, symptoms, physical examination, and diagnostic tests. Diagnostic tests for heart failure may include:  Echocardiography.  Electrocardiography.  Chest X-ray.  Blood tests.  Exercise stress test.  Cardiac angiography.  Radionuclide scans.  TREATMENT   Treatment is aimed at managing the symptoms of heart failure. Medicines, behavioral changes, or surgical intervention may be necessary to treat heart failure.  Medicines to help treat heart failure may include:  Angiotensin-converting enzyme (ACE) inhibitors. This type of medicine blocks the effects of a blood protein called angiotensin-converting enzyme. ACE inhibitors relax (dilate) the blood vessels and help lower blood  pressure.  Angiotensin receptor blockers (ARBs). This type of medicine blocks the actions of a blood protein called angiotensin. Angiotensin receptor blockers dilate the blood vessels and help lower blood pressure.  Water pills (diuretics). Diuretics cause the kidneys to remove salt and water from the blood. The extra fluid is removed through urination. This loss of extra fluid lowers the volume of blood the heart pumps.  Beta blockers. These prevent the heart from beating too fast and improve heart muscle strength.  Digitalis. This increases the force of the heartbeat.  Healthy behavior changes include:  Obtaining and maintaining a healthy weight.  Stopping smoking or chewing tobacco.  Eating heart-healthy foods.  Limiting or avoiding alcohol.  Stopping illicit drug use.  Physical activity as directed by your health care provider.  Surgical treatment for heart failure may include:  A procedure to open blocked arteries, repair damaged heart valves, or remove damaged heart muscle tissue.  A pacemaker to improve heart muscle function and control certain abnormal heart rhythms.  An internal cardioverter defibrillator to treat certain serious abnormal heart rhythms.  A left ventricular assist device (LVAD) to assist the pumping ability of the heart.        HOME CARE INSTRUCTIONS   Take medicines only as directed by your health care provider. Medicines are important in reducing the workload of your heart, slowing the progression of heart failure, and improving your symptoms.  Do not stop taking your medicine unless directed by your health care provider.  Do not skip any dose of medicine.  Refill your prescriptions before you run out of medicine. Your medicines are needed every day.  Engage in moderate physical activity if directed by your health care provider. Moderate physical activity can benefit some people. The elderly and people with severe heart failure should consult with a health care provider for physical  activity recommendations.  Eat heart-healthy foods. Food choices should be free of trans fat and low in saturated fat, cholesterol, and salt (sodium). Healthy choices include fresh or frozen fruits and vegetables, fish, lean meats, legumes, fat-free or low-fat dairy products, and whole grain or high fiber foods. Talk to a dietitian to learn more about heart-healthy foods.  Limit sodium if directed by your health care provider. Sodium restriction may reduce symptoms of heart failure in some people. Talk to a dietitian to learn more about heart-healthy seasonings.  Use healthy cooking methods. Healthy cooking methods include roasting, grilling, broiling, baking, poaching, steaming, or stir-frying. Talk to a dietitian to learn more about healthy cooking methods.  Limit fluids if directed by your health care provider. Fluid restriction may reduce symptoms of heart failure in some people.  Weigh yourself every day. Daily weights are important in the early recognition of excess fluid. You should weigh yourself every morning after you urinate and before you eat breakfast. Wear the same amount of clothing each time you weigh yourself. Record your daily weight. Provide your health care provider with your weight record.  Monitor and record your blood pressure if directed by your health care provider.  Check your pulse if directed by your health care provider.  Lose weight if directed by your health care provider. Weight loss may reduce symptoms of heart failure in some people.  Stop smoking or chewing tobacco. Nicotine makes your heart work harder by causing your blood vessels to constrict. Do not use nicotine gum or patches before talking to your health care provider.  Keep all follow-up visits as directed by your health care provider. This is important.  Limit alcohol intake to no more than 1 drink per day for nonpregnant women and 2 drinks per day for men. One drink equals 12 ounces of beer, 5 ounces of wine, or 1½ ounces  of hard liquor. Drinking more than that is harmful to your heart. Tell your health care provider if you drink alcohol several times a week. Talk with your health care provider about whether alcohol is safe for you. If your heart has already been damaged by alcohol or you have severe heart failure, drinking alcohol should be stopped completely.  Stop illicit drug use.  Stay up-to-date with immunizations. It is especially important to prevent respiratory infections through current pneumococcal and influenza immunizations.  Manage other health conditions such as hypertension, diabetes, thyroid disease, or abnormal heart rhythms as directed by your health care provider.  Learn to manage stress.  Plan rest periods when fatigued.  Learn strategies to manage high temperatures. If the weather is extremely hot:  Avoid vigorous physical activity.  Use air conditioning or fans or seek a cooler location.  Avoid caffeine and alcohol.  Wear loose-fitting, lightweight, and light-colored clothing.  Learn strategies to manage cold temperatures. If the weather is extremely cold:  Avoid vigorous physical activity.  Layer clothes.  Wear mittens or gloves, a hat, and a scarf when going outside.  Avoid alcohol.  Obtain ongoing education and support as needed.  Participate in or seek rehabilitation as needed to maintain or improve independence and quality of life.      Medication Type Medication Name/Dose How much to take When to take it                                                                                                           To Monitor Your Own Fluid Status at Home:   1.Weigh yourself daily   2. Weigh yourself at the same time every day-before breakfast is best   3. Use the same scale all the time   4. Wear the same amount of clothes when you weigh yourself   5. Empty your bladder before weighing   6. Record your weight on the daily record below   7. The weight at which there is just a little bit of swelling in the ankles  at the end of       the day is your ideal weight- try and maintain it   8. When taking diuretics, avoid drinking too much in the way of fluids, even if your      mouth is dry and you feel thirsty.  This could counter the effect of the diuretic           and dilute the body's salts causing weakness and confusion   9. You should drink no more than 2000 ml (8 glasses or cups) of fluid per day, or          whatever amount is prescribed for you      Date Weight Exercise Duration Symptoms Better/Worse/Same Swelling Better/Worse/Same                                                                                                                                                                Congestive Heart Failure Management Guide      Green Zone: All Clear Green Zone: Actions   Your Goal Weight____________  *No Shortness of Breath  *No Swelling  *No Weight Gain  *No Chest Pain  *No Decrease in your ability to    maintain your activity level *Your symptoms are under control  *Continue taking your medications as              ordered  *Continue daily weights  *Follow low salt diet  *Keep all physician/provider     appointments     Yellow Zone: Caution Yellow Zone: Actions   If you have any of the following signs or symptoms;    *weight gain of 3 or more pounds in 2 days  *increased cough  *increased swelling  *increased shortness of breath with activity  *increased in the number of pillows to          sleep comfortably    Call your provider's medical assistant or home health nurse or nurse coordinator *Your symptoms may indicate that you           need an adjustment of your medications  *Call your provider's medical         assistant, home health nurse, or      nurse coordinator  *NAME______________________    *NUMBER___________________    *INSTRUCTIONS_______________________________________________________________________________________________________       Red Zone: Medical Alert Red Zone: Actions   *Unrelieved shortness  "of breath   *Shortness of breath at rest  *Unrelieved chest pain  *Wheezing or chest tightness at rest  *Need to sit in chair to sleep  *Weight gain or loss of more than 5       pounds in 2 days  *Confusion This indicates that you need to be evaluated by a physician right away  Call your Doctor immediately if you are going into the red zone    Doctor:______________________________  Number:_____________________________  If unable to reach Doctor, then call 911                      Low-Sodium Eating Plan  Salt (sodium) helps you keep a healthy balance of fluids in your body. Too much sodium can raise your blood pressure. It can also cause fluid and waste to be held in your body.  Your health care provider or dietitian may recommend a low-sodium eating plan if you have high blood pressure (hypertension), kidney disease, liver disease, or heart failure. Eating less sodium can help lower your blood pressure and reduce swelling. It can also protect your heart, liver, and kidneys.  What are tips for following this plan?  Reading food labels    Check food labels for the amount of sodium per serving. If you eat more than one serving, you must multiply the listed amount by the number of servings.  Choose foods with less than 140 milligrams (mg) of sodium per serving.  Avoid foods with 300 mg of sodium or more per serving.  Always check how much sodium is in a product, even if the label says \"unsalted\" or \"no salt added.\"  Shopping    Buy products labeled as \"low-sodium\" or \"no salt added.\"  Buy fresh foods.  Avoid canned foods and pre-made or frozen meals.  Avoid canned, cured, or processed meats.  Buy breads that have less than 80 mg of sodium per slice.  Cooking    Eat more home-cooked food. Try to eat less restaurant, buffet, and fast food.  Try not to add salt when you cook. Use salt-free seasonings or herbs instead of table salt or sea salt. Check with your provider or pharmacist before using salt substitutes.  Cook with " "plant-based oils, such as canola, sunflower, or olive oil.  Meal planning  When eating at a restaurant, ask if your food can be made with less salt or no salt. Avoid dishes labeled as brined, pickled, cured, or smoked. Avoid dishes made with soy sauce, miso, or teriyaki sauce.  Avoid foods that have monosodium glutamate (MSG) in them. MSG may be added to some restaurant food, sauces, soups, bouillon, and canned foods.  Make meals that can be grilled, baked, poached, roasted, or steamed. These are often made with less sodium.  General information  Try to limit your sodium intake to 1,500-2,300 mg each day, or the amount told by your provider.  What foods should I eat?  Fruits  Fresh, frozen, or canned fruit. Fruit juice.  Vegetables  Fresh or frozen vegetables. \"No salt added\" canned vegetables. \"No salt added\" tomato sauce and paste. Low-sodium or reduced-sodium tomato and vegetable juice.  Grains  Low-sodium cereals, such as oats, puffed wheat and rice, and shredded wheat. Low-sodium crackers. Unsalted rice. Unsalted pasta. Low-sodium bread. Whole grain breads and whole grain pasta.  Meats and other proteins  Fresh or frozen meat, poultry, seafood, and fish. These should have no added salt. Low-sodium canned tuna and salmon. Unsalted nuts. Dried peas, beans, and lentils without added salt. Unsalted canned beans. Eggs. Unsalted nut butters.  Dairy  Milk. Soy milk. Cheese that is naturally low in sodium, such as ricotta cheese, fresh mozzarella, or Swiss cheese. Low-sodium or reduced-sodium cheese. Cream cheese. Yogurt.  Seasonings and condiments  Fresh and dried herbs and spices. Salt-free seasonings. Low-sodium mustard and ketchup. Sodium-free salad dressing. Sodium-free light mayonnaise. Fresh or refrigerated horseradish. Lemon juice. Vinegar.  Other foods  Homemade, reduced-sodium, or low-sodium soups. Unsalted popcorn and pretzels. Low-salt or salt-free chips.  The items listed above may not be all the foods and " drinks you can have. Talk to a dietitian to learn more.  What foods should I avoid?  Vegetables  Sauerkraut, pickled vegetables, and relishes. Olives. French fries. Onion rings. Regular canned vegetables, except low-sodium or reduced-sodium items. Regular canned tomato sauce and paste. Regular tomato and vegetable juice. Frozen vegetables in sauces.  Grains  Instant hot cereals. Bread stuffing, pancake, and biscuit mixes. Croutons. Seasoned rice or pasta mixes. Noodle soup cups. Boxed or frozen macaroni and cheese. Regular salted crackers. Self-rising flour.  Meats and other proteins  Meat or fish that is salted, canned, smoked, spiced, or pickled. Precooked or cured meat, such as sausages or meat loaves. [place]. Ham. Pepperoni. Hot dogs. Corned beef. Chipped beef. Salt pork. Jerky. Pickled herring, anchovies, and sardines. Regular canned tuna. Salted nuts.  Dairy  Processed cheese and cheese spreads. Hard cheeses. Cheese curds. Blue cheese. Feta cheese. String cheese. Regular cottage cheese. Buttermilk. Canned milk.  Fats and oils  Salted butter. Regular margarine. Ghee. [place] fat.  Seasonings and condiments  Onion salt, garlic salt, seasoned salt, table salt, and sea salt. Canned and packaged gravies. Worcestershire sauce. Tartar sauce. Barbecue sauce. Teriyaki sauce. Soy sauce, including reduced-sodium soy sauce. Steak sauce. Fish sauce. Oyster sauce. Cocktail sauce. Horseradish that you find on the shelf. Regular ketchup and mustard. Meat flavorings and tenderizers. Bouillon cubes. Hot sauce. Pre-made or packaged marinades. Pre-made or packaged taco seasonings. Relishes. Regular salad dressings. Salsa.  Other foods  Salted popcorn and pretzels. Corn chips and puffs. Potato and tortilla chips. Canned or dried soups. Pizza. Frozen entrees and pot pies.  The items listed above may not be all the foods and drinks you should avoid. Talk to a dietitian to learn more.  This information is not intended to replace advice  given to you by your health care provider. Make sure you discuss any questions you have with your health care provider.  Document Revised: 01/04/2024 Document Reviewed: 01/04/2024  [employer] Patient Education © 2024 [employer]

## 2024-11-19 RX ORDER — TEZEPELUMAB-EKKO 210 MG/1.9ML
INJECTION, SOLUTION SUBCUTANEOUS
Qty: 2 EACH | Refills: 4 | Status: SHIPPED | OUTPATIENT
Start: 2024-11-19

## 2024-12-12 ENCOUNTER — OFFICE VISIT (OUTPATIENT)
Dept: CARDIOLOGY | Facility: CLINIC | Age: 69
End: 2024-12-12
Payer: COMMERCIAL

## 2024-12-12 VITALS
HEIGHT: 62 IN | OXYGEN SATURATION: 95 % | DIASTOLIC BLOOD PRESSURE: 69 MMHG | HEART RATE: 73 BPM | WEIGHT: 243 LBS | BODY MASS INDEX: 44.72 KG/M2 | SYSTOLIC BLOOD PRESSURE: 120 MMHG

## 2024-12-12 DIAGNOSIS — I50.32 CHRONIC DIASTOLIC CONGESTIVE HEART FAILURE: Primary | ICD-10-CM

## 2024-12-12 DIAGNOSIS — I10 PRIMARY HYPERTENSION: ICD-10-CM

## 2024-12-12 DIAGNOSIS — R00.2 PALPITATIONS: ICD-10-CM

## 2024-12-12 RX ORDER — ISOSORBIDE MONONITRATE 60 MG/1
60 TABLET, EXTENDED RELEASE ORAL DAILY
Qty: 90 TABLET | Refills: 2 | Status: SHIPPED | OUTPATIENT
Start: 2024-12-12

## 2024-12-12 RX ORDER — SEMAGLUTIDE 0.68 MG/ML
INJECTION, SOLUTION SUBCUTANEOUS
COMMUNITY
Start: 2024-12-07

## 2024-12-12 RX ORDER — DAPAGLIFLOZIN 10 MG/1
10 TABLET, FILM COATED ORAL DAILY
Qty: 90 TABLET | Refills: 2 | Status: SHIPPED | OUTPATIENT
Start: 2024-12-12

## 2024-12-12 RX ORDER — METOPROLOL TARTRATE 50 MG
50 TABLET ORAL 2 TIMES DAILY
Qty: 180 TABLET | Refills: 2 | Status: SHIPPED | OUTPATIENT
Start: 2024-12-12

## 2024-12-12 RX ORDER — ATORVASTATIN CALCIUM 40 MG/1
40 TABLET, FILM COATED ORAL
Qty: 90 TABLET | Refills: 2 | Status: SHIPPED | OUTPATIENT
Start: 2024-12-12

## 2024-12-12 NOTE — PROGRESS NOTES
Nela Shaw MD  [facility]  1955  12/12/2024    Patient Active Problem List   Diagnosis    Asthma    Former smoker    Primary hypertension    Chronic respiratory failure with hypoxia    Palpitations    Severe persistent asthma without complication    Heart valve disease    Dyslipidemia    Coronary atherosclerosis    Pneumonia, unspecified organism    Hematochezia    Hemorrhoid    Hypertriglyceridemia    Iron deficiency anemia    Irritable bowel syndrome    Left carotid artery stenosis    Mass of breast    Post-operative nausea and vomiting    Nonalcoholic fatty liver    Morbid (severe) obesity due to excess calories    Obstructive sleep apnea syndrome    Osteoarthritis    Physical deconditioning    Polyneuropathy    Preoperative examination, unspecified    Protein-calorie malnutrition    Rectal prolapse    Rectal tenesmus    Seasonal allergic rhinitis    Seasonal allergies    Shortness of breath    Tubular adenoma of colon    Type 2 diabetes mellitus    Ventral hernia    Vitamin D deficiency    Weight loss    Dependence on supplemental oxygen    History of cold sores    Diverticular disease    Diverticulitis    Diverticulitis of colon    Dizziness    Edema    Encounter for adjustment and management of neurostimulator    Fatigue    Rectal pain    Hiatal hernia    Gastroparesis    Heart failure, unspecified    Helicobacter pylori infection    Long term (current) use of inhaled steroids    Abnormal CT of the chest    Anemia    Anxiety    Atypical chest pain    Cataract    Chronic congestive heart failure    Chronic kidney disease    Deep vein thrombosis (DVT) of lower extremity    Degeneration of intervertebral disc of lumbar region    Diabetic gastroparesis associated with type 2 diabetes mellitus    Diarrhea    Diabetic nephropathy associated with type 2 diabetes mellitus    Disorder of paranasal sinus    Pulmonary nodule    ERRONEOUS ENCOUNTER--DISREGARD       Dear Nela Shaw,  MD:    Subjective     History of Present Illness:    Chief Complaint   Patient presents with    Follow-up     ROUTINE       Josefina Cheng is a pleasant 69 y.o. female with a past medical history significant for wide complex tachycardia, sleep apnea, obesity, diabetes mellitus, tobacco abuse but did quit around 2010, essential hypertension, dyslipidemia. She comes in today for cardiology follow-up.     Patient denies any chest pain, shortness of breath, palpitations, dizziness, syncope or near syncope. Blood pressure is well controled.     Allergies   Allergen Reactions    Adhesive Tape Rash    Cephalexin Hives    Cephalosporins Hives    Ciprofloxacin Hcl Hives    Penicillins Hives   :      Current Outpatient Medications:     albuterol sulfate  (90 Base) MCG/ACT inhaler, Inhale 2 puffs Every 4 (Four) Hours As Needed for Wheezing or Shortness of Air., Disp: 18 g, Rfl: 8    ascorbic acid (VITAMIN C) 1000 MG tablet, Take 1 tablet by mouth Daily., Disp: , Rfl:     atorvastatin (LIPITOR) 40 MG tablet, Take 1 tablet by mouth every night at bedtime., Disp: 90 tablet, Rfl: 2    budesonide-formoterol (Symbicort) 160-4.5 MCG/ACT inhaler, Inhale 2 puffs 2 (Two) Times a Day., Disp: 1 each, Rfl: 12    calcium citrate-vitamin d (CALCITRATE) 315-250 MG-UNIT tablet tablet, Take  by mouth Daily., Disp: , Rfl:     clopidogrel (PLAVIX) 75 MG tablet, Take 1 tablet by mouth Daily., Disp: 30 tablet, Rfl: 11    dapagliflozin Propanediol (Farxiga) 10 MG tablet, Take 10 mg by mouth Daily., Disp: 90 tablet, Rfl: 2    diphenhydrAMINE (BENADRYL) 25 mg capsule, Take 1 capsule by mouth Every 6 (Six) Hours As Needed for Itching., Disp: , Rfl:     fexofenadine (Allegra Allergy) 180 MG tablet, Take 1 tablet by mouth Daily., Disp: 30 tablet, Rfl: 5    guaiFENesin (MUCINEX) 600 MG 12 hr tablet, Take 1 tablet by mouth 2 (Two) Times a Day., Disp: 30 tablet, Rfl: 0    insulin aspart (novoLOG) 100 UNIT/ML injection, Inject  under the skin 3  (Three) Times a Day Before Meals., Disp: , Rfl:     ipratropium-albuterol (DUO-NEB) 0.5-2.5 mg/3 ml nebulizer, Take 3 mL by nebulization 4 (Four) Times a Day As Needed for Wheezing or Shortness of Air., Disp: 360 mL, Rfl: 8    isosorbide mononitrate (IMDUR) 60 MG 24 hr tablet, Take 1 tablet by mouth Daily., Disp: 90 tablet, Rfl: 2    lisinopril (PRINIVIL,ZESTRIL) 2.5 MG tablet, Take 1 tablet by mouth Daily. FOR BLOOD PRESSURE, Disp: , Rfl:     metoprolol tartrate (LOPRESSOR) 50 MG tablet, Take 1 tablet by mouth 2 (Two) Times a Day., Disp: 180 tablet, Rfl: 2    Multiple Vitamins-Minerals (ZINC PO), Take  by mouth., Disp: , Rfl:     nitroglycerin (NITROSTAT) 0.4 MG SL tablet, 1 under the tongue as needed for angina, may repeat q5mins for up three doses, Disp: 25 tablet, Rfl: 2    omeprazole (priLOSEC) 40 MG capsule, , Disp: , Rfl:     ondansetron (ZOFRAN) 4 MG tablet, Every 12 (Twelve) Hours., Disp: , Rfl:     Ozempic, 0.25 or 0.5 MG/DOSE, 2 MG/3ML solution pen-injector, , Disp: , Rfl:     promethazine (PHENERGAN) 25 MG tablet, Take 1 tablet by mouth Every 6 (Six) Hours As Needed for Nausea or Vomiting., Disp: , Rfl:     Tezspire 210 MG/1.91ML injection, INJECT 1 SYRINGE UNDER THE SKIN EVERY 28 DAYS, Disp: 2 each, Rfl: 4    TiZANidine (Zanaflex) 4 MG capsule, Take 1 capsule by mouth 2 (Two) Times a Day As Needed for Muscle Spasms., Disp: 12 capsule, Rfl: 0    Toujeo Max SoloStar 300 UNIT/ML solution pen-injector injection, INJECT 54 UNITS UNDER THE SKIN IN THE MORNING AND 54 UNITS IN THE EVENING, Disp: , Rfl:     venlafaxine (EFFEXOR) 75 MG tablet, Take 1 tablet by mouth 2 (Two) Times a Day., Disp: , Rfl:     vitamin D (ERGOCALCIFEROL) 1.25 MG (80215 UT) capsule capsule, TAKE ONE CAPSULE BY MOUTH ONCE EVERY WEEK FOR VITAMIN D DEFICIENCY, Disp: , Rfl:     metFORMIN ER (GLUCOPHAGE-XR) 500 MG 24 hr tablet, TAKE TWO TABLETS BY MOUTH TWO TIMES A DAY FOR BLOOD SUGAR (Patient not taking: Reported on 12/12/2024), Disp: , Rfl:  "    The following portions of the patient's history were reviewed and updated as appropriate: allergies, current medications, past family history, past medical history, past social history, past surgical history and problem list.    Social History     Tobacco Use    Smoking status: Former     Current packs/day: 0.00     Average packs/day: 2.0 packs/day for 28.0 years (56.0 ttl pk-yrs)     Types: Cigarettes     Start date: 1983     Quit date: 2011     Years since quittin.9     Passive exposure: Past    Smokeless tobacco: Never   Vaping Use    Vaping status: Never Used   Substance Use Topics    Alcohol use: Never    Drug use: Never         Objective   Vitals:    24 1250   BP: 120/69   Pulse: 73   SpO2: 95%   Weight: 110 kg (243 lb)   Height: 157.5 cm (62\")     Body mass index is 44.45 kg/m².    ROS    Constitutional:       General: Not in acute distress.     Appearance: Healthy appearance. Well-developed and not in distress. Not diaphoretic.   Eyes:      Conjunctiva/sclera: Conjunctivae normal.      Pupils: Pupils are equal, round, and reactive to light.   HENT:      Head: Normocephalic and atraumatic.   Neck:      Vascular: No carotid bruit or JVD.   Pulmonary:      Effort: Pulmonary effort is normal. No respiratory distress.      Breath sounds: Normal breath sounds.   Cardiovascular:      Normal rate. Regular rhythm.   Edema:     Peripheral edema absent.   Skin:     General: Skin is cool.   Neurological:      Mental Status: Alert, oriented to person, place, and time and oriented to person, place and time.         Lab Results   Component Value Date     (L) 2024    K 4.7 2024    CL 94 (L) 2024    CO2 26.7 2024    BUN 11 2024    CREATININE 0.67 2024    GLUCOSE 212 (H) 2024    CALCIUM 9.1 2024    AST 13 2024    ALT 11 2024    ALKPHOS 95 2024     No results found for: \"CKTOTAL\"  Lab Results   Component Value Date    WBC 9.08 " "02/09/2024    HGB 13.6 02/09/2024    HCT 45.4 02/09/2024     02/09/2024     Lab Results   Component Value Date    INR 0.95 02/28/2024    INR 1.01 08/11/2022     Lab Results   Component Value Date    MG 1.8 02/28/2024     No results found for: \"TSH\", \"PSA\", \"CHLPL\", \"TRIG\", \"HDL\", \"LDL\"   No results found for: \"BNP\"    During this visit the following were done:  Labs Reviewed []    Labs Ordered []    Radiology Reports Reviewed []    Radiology Ordered []    PCP Records Reviewed []    Referring Provider Records Reviewed []    ER Records Reviewed []    Hospital Records Reviewed []    History Obtained From Family []    Radiology Images Reviewed []    Other Reviewed []    Records Requested []         ECG 12 Lead    Date/Time: 12/12/2024 12:50 PM  Performed by: Jay Jones PA-C    Authorized by: Jay Jones PA-C  Comparison: compared with previous ECG   Similar to previous ECG  Rhythm: sinus rhythm    Clinical impression: non-specific ECG  Comments: Patient does have gastric stimulator that interferes with ECG but is still interpretable        Assessment & Plan    Diagnosis Plan   1. Chronic diastolic congestive heart failure  Comprehensive Metabolic Panel    Lipid Panel    TSH      2. Primary hypertension  Comprehensive Metabolic Panel    Lipid Panel    TSH      3. Palpitations  isosorbide mononitrate (IMDUR) 60 MG 24 hr tablet    metoprolol tartrate (LOPRESSOR) 50 MG tablet               Recommendations:  Essential hypertension  Appears to be excellently controlled continue current regimen  Chronic HFpEF  Appears euvolemic continue with GDMT with Farxiga, lisinopril, metoprolol.  Dyslipidemia  Continue Lipitor.  I did order routine labs    No follow-ups on file.    As always, I appreciate very much the opportunity to participate in the cardiovascular care of your patients.      With Best Regards,    Jay Jones PA-C          "

## 2024-12-18 ENCOUNTER — CLINICAL SUPPORT (OUTPATIENT)
Dept: PULMONOLOGY | Facility: CLINIC | Age: 69
End: 2024-12-18
Payer: COMMERCIAL

## 2024-12-18 DIAGNOSIS — J45.50 SEVERE PERSISTENT ASTHMA, UNSPECIFIED WHETHER COMPLICATED: Primary | ICD-10-CM

## 2024-12-19 ENCOUNTER — APPOINTMENT (OUTPATIENT)
Dept: CT IMAGING | Facility: HOSPITAL | Age: 69
End: 2024-12-19
Payer: COMMERCIAL

## 2024-12-19 ENCOUNTER — HOSPITAL ENCOUNTER (EMERGENCY)
Facility: HOSPITAL | Age: 69
Discharge: HOME | End: 2024-12-19
Attending: STUDENT IN AN ORGANIZED HEALTH CARE EDUCATION/TRAINING PROGRAM
Payer: MEDICARE

## 2024-12-19 VITALS
WEIGHT: 240 LBS | TEMPERATURE: 97.7 F | SYSTOLIC BLOOD PRESSURE: 140 MMHG | BODY MASS INDEX: 44.16 KG/M2 | RESPIRATION RATE: 14 BRPM | DIASTOLIC BLOOD PRESSURE: 81 MMHG | HEIGHT: 62 IN | HEART RATE: 65 BPM | OXYGEN SATURATION: 93 %

## 2024-12-19 DIAGNOSIS — R10.9 FLANK PAIN: Primary | ICD-10-CM

## 2024-12-19 LAB
ALBUMIN SERPL-MCNC: 3.9 G/DL (ref 3.5–5.2)
ALBUMIN/GLOB SERPL: 1.4 G/DL
ALP SERPL-CCNC: 123 U/L (ref 39–117)
ALT SERPL W P-5'-P-CCNC: 15 U/L (ref 1–33)
ANION GAP SERPL CALCULATED.3IONS-SCNC: 10 MMOL/L (ref 5–15)
AST SERPL-CCNC: 17 U/L (ref 1–32)
BACTERIA UR QL AUTO: ABNORMAL /HPF
BASOPHILS # BLD AUTO: 0.05 10*3/MM3 (ref 0–0.2)
BASOPHILS NFR BLD AUTO: 0.7 % (ref 0–1.5)
BILIRUB SERPL-MCNC: 0.3 MG/DL (ref 0–1.2)
BILIRUB UR QL STRIP: NEGATIVE
BUN SERPL-MCNC: 8 MG/DL (ref 8–23)
BUN/CREAT SERPL: 10.5 (ref 7–25)
CALCIUM SPEC-SCNC: 8.4 MG/DL (ref 8.6–10.5)
CHLORIDE SERPL-SCNC: 100 MMOL/L (ref 98–107)
CLARITY UR: CLEAR
CO2 SERPL-SCNC: 28 MMOL/L (ref 22–29)
COLOR UR: YELLOW
CREAT SERPL-MCNC: 0.76 MG/DL (ref 0.57–1)
D-LACTATE SERPL-SCNC: 1.3 MMOL/L (ref 0.5–2)
DEPRECATED RDW RBC AUTO: 49.1 FL (ref 37–54)
EGFRCR SERPLBLD CKD-EPI 2021: 84.4 ML/MIN/1.73
EOSINOPHIL # BLD AUTO: 0.13 10*3/MM3 (ref 0–0.4)
EOSINOPHIL NFR BLD AUTO: 1.7 % (ref 0.3–6.2)
ERYTHROCYTE [DISTWIDTH] IN BLOOD BY AUTOMATED COUNT: 15.5 % (ref 12.3–15.4)
GLOBULIN UR ELPH-MCNC: 2.7 GM/DL
GLUCOSE SERPL-MCNC: 87 MG/DL (ref 65–99)
GLUCOSE UR STRIP-MCNC: ABNORMAL MG/DL
HCT VFR BLD AUTO: 42.5 % (ref 34–46.6)
HGB BLD-MCNC: 12.9 G/DL (ref 12–15.9)
HGB UR QL STRIP.AUTO: NEGATIVE
HOLD SPECIMEN: NORMAL
HYALINE CASTS UR QL AUTO: ABNORMAL /LPF
IMM GRANULOCYTES # BLD AUTO: 0.02 10*3/MM3 (ref 0–0.05)
IMM GRANULOCYTES NFR BLD AUTO: 0.3 % (ref 0–0.5)
KETONES UR QL STRIP: NEGATIVE
LEUKOCYTE ESTERASE UR QL STRIP.AUTO: ABNORMAL
LIPASE SERPL-CCNC: 31 U/L (ref 13–60)
LYMPHOCYTES # BLD AUTO: 2.24 10*3/MM3 (ref 0.7–3.1)
LYMPHOCYTES NFR BLD AUTO: 29.9 % (ref 19.6–45.3)
MCH RBC QN AUTO: 26.3 PG (ref 26.6–33)
MCHC RBC AUTO-ENTMCNC: 30.4 G/DL (ref 31.5–35.7)
MCV RBC AUTO: 86.6 FL (ref 79–97)
MONOCYTES # BLD AUTO: 0.65 10*3/MM3 (ref 0.1–0.9)
MONOCYTES NFR BLD AUTO: 8.7 % (ref 5–12)
NEUTROPHILS NFR BLD AUTO: 4.39 10*3/MM3 (ref 1.7–7)
NEUTROPHILS NFR BLD AUTO: 58.7 % (ref 42.7–76)
NITRITE UR QL STRIP: NEGATIVE
NRBC BLD AUTO-RTO: 0 /100 WBC (ref 0–0.2)
PH UR STRIP.AUTO: 6.5 [PH] (ref 5–8)
PLATELET # BLD AUTO: 304 10*3/MM3 (ref 140–450)
PMV BLD AUTO: 8.8 FL (ref 6–12)
POTASSIUM SERPL-SCNC: 3.7 MMOL/L (ref 3.5–5.2)
PROT SERPL-MCNC: 6.6 G/DL (ref 6–8.5)
PROT UR QL STRIP: NEGATIVE
RBC # BLD AUTO: 4.91 10*6/MM3 (ref 3.77–5.28)
RBC # UR STRIP: ABNORMAL /HPF
REF LAB TEST METHOD: ABNORMAL
SODIUM SERPL-SCNC: 138 MMOL/L (ref 136–145)
SP GR UR STRIP: 1.02 (ref 1–1.03)
SQUAMOUS #/AREA URNS HPF: ABNORMAL /HPF
UROBILINOGEN UR QL STRIP: ABNORMAL
WBC # UR STRIP: ABNORMAL /HPF
WBC NRBC COR # BLD AUTO: 7.48 10*3/MM3 (ref 3.4–10.8)
WHOLE BLOOD HOLD COAG: NORMAL
WHOLE BLOOD HOLD SPECIMEN: NORMAL

## 2024-12-19 PROCEDURE — 80053 COMPREHEN METABOLIC PANEL: CPT | Performed by: STUDENT IN AN ORGANIZED HEALTH CARE EDUCATION/TRAINING PROGRAM

## 2024-12-19 PROCEDURE — 74176 CT ABD & PELVIS W/O CONTRAST: CPT

## 2024-12-19 PROCEDURE — 83605 ASSAY OF LACTIC ACID: CPT | Performed by: STUDENT IN AN ORGANIZED HEALTH CARE EDUCATION/TRAINING PROGRAM

## 2024-12-19 PROCEDURE — 99284 EMERGENCY DEPT VISIT MOD MDM: CPT

## 2024-12-19 PROCEDURE — 85025 COMPLETE CBC W/AUTO DIFF WBC: CPT | Performed by: STUDENT IN AN ORGANIZED HEALTH CARE EDUCATION/TRAINING PROGRAM

## 2024-12-19 PROCEDURE — 81001 URINALYSIS AUTO W/SCOPE: CPT | Performed by: STUDENT IN AN ORGANIZED HEALTH CARE EDUCATION/TRAINING PROGRAM

## 2024-12-19 PROCEDURE — 74176 CT ABD & PELVIS W/O CONTRAST: CPT | Performed by: RADIOLOGY

## 2024-12-19 PROCEDURE — 36415 COLL VENOUS BLD VENIPUNCTURE: CPT

## 2024-12-19 PROCEDURE — 83690 ASSAY OF LIPASE: CPT | Performed by: STUDENT IN AN ORGANIZED HEALTH CARE EDUCATION/TRAINING PROGRAM

## 2024-12-19 RX ORDER — SODIUM CHLORIDE 0.9 % (FLUSH) 0.9 %
10 SYRINGE (ML) INJECTION AS NEEDED
Status: DISCONTINUED | OUTPATIENT
Start: 2024-12-19 | End: 2024-12-19 | Stop reason: HOSPADM

## 2024-12-19 NOTE — ED PROVIDER NOTES
Subjective   History of Present Illness  69-year-old female with past medical history of chronic bronchitis, congestive heart failure, diabetes, hypertension presents today with primary complaint of left-sided flank pain and abdominal pain this been present for the past 1 to 2 days.  No nausea.  No vomiting.  No obvious hematuria or dysuria.  Obvious aggravating or alleviating factors.  Vitals stable.  Afebrile      Review of Systems   Genitourinary:  Positive for flank pain.   All other systems reviewed and are negative.      Past Medical History:   Diagnosis Date    Asthma 1978    CHF (congestive heart failure) 2021    Chronic bronchitis     Chronic kidney disease 11/1/2016    Deep vein thrombosis 2015    Diabetes mellitus     Diverticulitis     Hyperlipidemia 1999    Hypertension     Obstructive sleep apnea syndrome 4/12/2018       Allergies   Allergen Reactions    Adhesive Tape Rash    Cephalexin Hives    Cephalosporins Hives    Ciprofloxacin Hcl Hives    Penicillins Hives       Past Surgical History:   Procedure Laterality Date    ANKLE SURGERY      BACK SURGERY      CHOLECYSTECTOMY      FOOT SURGERY Left     GASTRIC STIMULATOR IMPLANT SURGERY  2017    changed battery 11/2022    HAND SURGERY      HERNIA REPAIR      HYSTERECTOMY      KNEE SURGERY Left     TUBAL ABDOMINAL LIGATION         Family History   Problem Relation Age of Onset    Osteoporosis Mother     Clotting disorder Mother     Cancer Father     Cancer Sister     Anemia Sister     Fainting Sister     Hypertension Sister     Asthma Sister     Asthma Brother     Asthma Maternal Grandfather     Heart attack Paternal Grandmother     Heart disease Paternal Grandmother        Social History     Socioeconomic History    Marital status:    Tobacco Use    Smoking status: Former     Current packs/day: 0.00     Average packs/day: 2.0 packs/day for 28.0 years (56.0 ttl pk-yrs)     Types: Cigarettes     Start date: 1/1/1983     Quit date: 1/1/2011     Years  since quittin.9     Passive exposure: Past    Smokeless tobacco: Never   Vaping Use    Vaping status: Never Used   Substance and Sexual Activity    Alcohol use: Never    Drug use: Never    Sexual activity: Not Currently     Partners: Male     Birth control/protection: Condom, Pill, Surgical, Tubal ligation, Birth control pill, Hysterectomy           Objective   Physical Exam  Constitutional:       General: She is not in acute distress.     Appearance: Normal appearance. She is not ill-appearing.   HENT:      Head: Normocephalic and atraumatic.      Right Ear: External ear normal.      Left Ear: External ear normal.      Nose: Nose normal.      Mouth/Throat:      Mouth: Mucous membranes are moist.   Eyes:      Extraocular Movements: Extraocular movements intact.      Pupils: Pupils are equal, round, and reactive to light.   Cardiovascular:      Rate and Rhythm: Normal rate and regular rhythm.      Heart sounds: No murmur heard.  Pulmonary:      Effort: Pulmonary effort is normal. No respiratory distress.      Breath sounds: Normal breath sounds. No wheezing.   Abdominal:      General: Bowel sounds are normal.      Palpations: Abdomen is soft.      Tenderness: There is abdominal tenderness. There is left CVA tenderness.   Musculoskeletal:         General: No deformity or signs of injury. Normal range of motion.      Cervical back: Normal range of motion and neck supple.   Skin:     General: Skin is warm and dry.      Findings: No erythema.   Neurological:      General: No focal deficit present.      Mental Status: She is alert and oriented to person, place, and time. Mental status is at baseline.      Cranial Nerves: No cranial nerve deficit.   Psychiatric:         Mood and Affect: Mood normal.         Behavior: Behavior normal.         Thought Content: Thought content normal.         Procedures           ED Course                                        CT Abdomen Pelvis Without Contrast    Result Date:  12/19/2024   1. No obstructive uropathy or urolithiasis  2. Other incidental findings as discussed above.        l  This report was finalized on 12/19/2024 3:09 PM by Dr. Abdirahman Martinez MD.         Results for orders placed or performed during the hospital encounter of 12/19/24   Comprehensive Metabolic Panel    Collection Time: 12/19/24  1:47 PM    Specimen: Blood   Result Value Ref Range    Glucose 87 65 - 99 mg/dL    BUN 8 8 - 23 mg/dL    Creatinine 0.76 0.57 - 1.00 mg/dL    Sodium 138 136 - 145 mmol/L    Potassium 3.7 3.5 - 5.2 mmol/L    Chloride 100 98 - 107 mmol/L    CO2 28.0 22.0 - 29.0 mmol/L    Calcium 8.4 (L) 8.6 - 10.5 mg/dL    Total Protein 6.6 6.0 - 8.5 g/dL    Albumin 3.9 3.5 - 5.2 g/dL    ALT (SGPT) 15 1 - 33 U/L    AST (SGOT) 17 1 - 32 U/L    Alkaline Phosphatase 123 (H) 39 - 117 U/L    Total Bilirubin 0.3 0.0 - 1.2 mg/dL    Globulin 2.7 gm/dL    A/G Ratio 1.4 g/dL    BUN/Creatinine Ratio 10.5 7.0 - 25.0    Anion Gap 10.0 5.0 - 15.0 mmol/L    eGFR 84.4 >60.0 mL/min/1.73   Lipase    Collection Time: 12/19/24  1:47 PM    Specimen: Blood   Result Value Ref Range    Lipase 31 13 - 60 U/L   Urinalysis With Microscopic If Indicated (No Culture) - Urine, Clean Catch    Collection Time: 12/19/24  1:47 PM    Specimen: Urine, Clean Catch   Result Value Ref Range    Color, UA Yellow Yellow, Straw    Appearance, UA Clear Clear    pH, UA 6.5 5.0 - 8.0    Specific Gravity, UA 1.019 1.005 - 1.030    Glucose, UA >=1000 mg/dL (3+) (A) Negative    Ketones, UA Negative Negative    Bilirubin, UA Negative Negative    Blood, UA Negative Negative    Protein, UA Negative Negative    Leuk Esterase, UA Small (1+) (A) Negative    Nitrite, UA Negative Negative    Urobilinogen, UA 1.0 E.U./dL 0.2 - 1.0 E.U./dL   Lactic Acid, Plasma    Collection Time: 12/19/24  1:47 PM    Specimen: Blood   Result Value Ref Range    Lactate 1.3 0.5 - 2.0 mmol/L   CBC Auto Differential    Collection Time: 12/19/24  1:47 PM    Specimen: Blood    Result Value Ref Range    WBC 7.48 3.40 - 10.80 10*3/mm3    RBC 4.91 3.77 - 5.28 10*6/mm3    Hemoglobin 12.9 12.0 - 15.9 g/dL    Hematocrit 42.5 34.0 - 46.6 %    MCV 86.6 79.0 - 97.0 fL    MCH 26.3 (L) 26.6 - 33.0 pg    MCHC 30.4 (L) 31.5 - 35.7 g/dL    RDW 15.5 (H) 12.3 - 15.4 %    RDW-SD 49.1 37.0 - 54.0 fl    MPV 8.8 6.0 - 12.0 fL    Platelets 304 140 - 450 10*3/mm3    Neutrophil % 58.7 42.7 - 76.0 %    Lymphocyte % 29.9 19.6 - 45.3 %    Monocyte % 8.7 5.0 - 12.0 %    Eosinophil % 1.7 0.3 - 6.2 %    Basophil % 0.7 0.0 - 1.5 %    Immature Grans % 0.3 0.0 - 0.5 %    Neutrophils, Absolute 4.39 1.70 - 7.00 10*3/mm3    Lymphocytes, Absolute 2.24 0.70 - 3.10 10*3/mm3    Monocytes, Absolute 0.65 0.10 - 0.90 10*3/mm3    Eosinophils, Absolute 0.13 0.00 - 0.40 10*3/mm3    Basophils, Absolute 0.05 0.00 - 0.20 10*3/mm3    Immature Grans, Absolute 0.02 0.00 - 0.05 10*3/mm3    nRBC 0.0 0.0 - 0.2 /100 WBC   Urinalysis, Microscopic Only - Urine, Clean Catch    Collection Time: 12/19/24  1:47 PM    Specimen: Urine, Clean Catch   Result Value Ref Range    RBC, UA 0-2 None Seen, 0-2 /HPF    WBC, UA 11-20 (A) None Seen, 0-2 /HPF    Bacteria, UA 1+ (A) None Seen /HPF    Squamous Epithelial Cells, UA 3-6 (A) None Seen, 0-2 /HPF    Hyaline Casts, UA None Seen None Seen /LPF    Methodology Automated Microscopy    [place] Urine Culture Tube - Urine, Clean Catch    Collection Time: 12/19/24  1:47 PM    Specimen: Urine, Clean Catch   Result Value Ref Range    Extra Tube Hold for add-ons.    Green Top (Gel)    Collection Time: 12/19/24  1:47 PM   Result Value Ref Range    Extra Tube Hold for add-ons.    Lavender Top    Collection Time: 12/19/24  1:47 PM   Result Value Ref Range    Extra Tube hold for add-on    Gold Top - SST    Collection Time: 12/19/24  1:47 PM   Result Value Ref Range    Extra Tube Hold for add-ons.    Light Blue Top    Collection Time: 12/19/24  1:47 PM   Result Value Ref Range    Extra Tube Hold for add-ons.                     Medical Decision Making  CBC and CMP are unremarkable.  Urinalysis slightly concerning for contamination.  CT imaging of the abdomen and pelvis revealed no acute abnormality.  Not rule out musculoskeletal source.  Results discussed with the patient.  Cervical management recommended.  Work up and results were discussed throughly with the patient.  The patient will be discharged for further monitoring and management with their PCP.  Red flags, warning signs, worsening symptoms, and when to return to the ER discussed with and understood by the patient.  Patient will follow up with their PCP in a timely manner.  Vitals stable at discharge.      MDM:    Escalation of care including admission/observation considered    - Discussions of management with other providers:  None    - Discussed/reviewed with Radiology regarding test interpretation    - Independent interpretation: Labs and CT    - Additional patient history obtained from: None    - Review of external non-ED record (if available):  Prior Inpt record, Office record, Outpt record, Prior Outpt labs, PCP record, Outside ED record, Other    - Chronic conditions affecting care: See HPI and medical Hx.    - Social Determinants of health significantly affecting care:  None        Medical Decision Making Discussion:    The patient has been given very strict return precautions to return to the emergency department should there be any acute change or worsening of their condition.  I have explained my findings and the patient has expressed understanding to me.  I explained that the work-up performed in the ED has been based on the specific complaint and concern, as the nature of emergency medicine is complaint driven and they understand that new symptoms may arise.  I have told them that, should there be any new symptoms, worsening or changing symptoms, a new work-up may be indicated that they are encouraged to return to the emergency department or promptly  contact their primary care physician. We have employed a shared decision-making process as the discussion of their disposition.  The patient has been educated as to the nature of the visit, the tests and work-up performed and the findings from today's visit. At this time, there does not appear to be any acute emergent process that necessitates admission to the hospital, however, the patient understands that this can change unexpectedly. At this time, the patient is stable for discharge home and agrees to follow-up with her primary care physician in the next 24 to 48 hours or earlier should they be able to obtain an appointment.    The patient was counseled regarding diagnostic results and treatment plan and patient has indicated understanding of these instructions.    Please follow up with your primary care physician in the next 1-3 days. If this is not possible, please return to the ED for re-evaluation.    It is IMPORTANT to see your DOCTOR OR PRIMARY CARE PROVIDER. Emergency Care may be incomplete without proper follow-up.  Your evaluation in the emergency department is focused on a specific clinical complaint, at a given moment in time.  Symptoms sometimes change or new symptoms might arise after you leave the Emergency Department. It is important that you call your doctor if you become worse in any way, or promptly return to the Emergency Department should any new, or worsening/changing symptom occur.  You are strongly urged to follow-up with your physician to assure complete and thorough care. PLEASE CALL YOUR DOCTORS OFFICE TODAY, INFORM THEM THAT YOU WERE SEEN IN THE EMERGENCY DEPARTMENT, AND THAT YOU NEED TO BE SEEN IMMEDIATELY FOR CLOSE FOLLOW UP.  If you do not have a primary care doctor we encourage you to proactively seek a local physician for close follow up.  Consider local clinics, free or sliding scale clinics, local [facility].  You can always return to the Emergency Department if you are  having difficulty coordinating close follow up.  If medications were prescribed you should fill them at your local pharmacy immediately and take only as prescribed.  Bring your new medications to your doctors follow up visit to discuss any changes that would be necessary. RETURN TO THE EMERGENCY DEPARTMENT IMMEDIATELY FOR ANY NEW OR WORSENING SYMPTOMS.    ADDITIONAL DISCHARGE INSTRUCTIONS:     - If you received IV contrast today with a CT or MRI please stay well hydrated for the next 48-72 hours to protect your kidneys.    - If you have been prescribed an antibiotic, taking an over the counter probiotic may help with gastrointestinal side effects and antibiotic associated diarrhea.    - Return to the emergency department or seek immediate medical care if any of the following occur:           - Symptoms do not improve in 8-12 hours. If this occurs, please return to the emergency department for re-evaluation or your symptoms or repeat abdominal examination         - Symptoms worsen at any time.         - If you are unable to follow up with a medical provider as instructed.         - You develop any worrisome symptoms such as fever, chills, uncontrolled pain, change or worsening of your pain symptoms, intractable vomiting, uncontrolled diarrhea, blood in your stool, dark/tarry stool, new neurological symptoms etc.    If you have been prescribed a narcotic pain medication, please take a stool softener to prevent constipation.     During your ED visit, you may have been given narcotics (such as morphine, dilaudid, percocet, vicodin) or sedatives (such as ativan, versed). These medications can impair your reflexes so, DO NOT DRIVE or USE ANY MACHINERY for at least 6 hours if you have been given these medications.    REMINDER FOR METFORMIN USERS: If you are using metformin (also known as Glucophage) and if you received a CT scan in which you received IV contrast, you must hold your metformin for a total of 72 hrs.  This  will prevent any adverse interactions between the two agents.        Amount and/or Complexity of Data Reviewed  Labs: ordered. Decision-making details documented in ED Course.  Radiology: ordered. Decision-making details documented in ED Course.    Risk  Prescription drug management.        Final diagnoses:   Flank pain       ED Disposition  ED Disposition       ED Disposition   Discharge    Condition   Stable    Comment   --               No follow-up provider specified.       Medication List      No changes were made to your prescriptions during this visit.            Clarence Burks, DO  12/19/24 1536

## 2025-01-06 NOTE — TELEPHONE ENCOUNTER
Patient needs:     Clopidogrel 75mg.     [facility] in [place].     Thanks!

## 2025-01-07 RX ORDER — CLOPIDOGREL BISULFATE 75 MG/1
75 TABLET ORAL DAILY
Qty: 30 TABLET | Refills: 11 | Status: SHIPPED | OUTPATIENT
Start: 2025-01-07

## 2025-01-15 ENCOUNTER — CLINICAL SUPPORT (OUTPATIENT)
Dept: PULMONOLOGY | Facility: CLINIC | Age: 70
End: 2025-01-15
Payer: COMMERCIAL

## 2025-01-15 DIAGNOSIS — J45.50 SEVERE PERSISTENT ASTHMA, UNSPECIFIED WHETHER COMPLICATED: Primary | ICD-10-CM

## 2025-02-10 ENCOUNTER — TRANSCRIBE ORDERS (OUTPATIENT)
Dept: ADMINISTRATIVE | Facility: HOSPITAL | Age: 70
End: 2025-02-10
Payer: COMMERCIAL

## 2025-02-10 DIAGNOSIS — R09.89 DECREASED CAROTID PULSE: Primary | ICD-10-CM

## 2025-02-13 ENCOUNTER — CLINICAL SUPPORT (OUTPATIENT)
Dept: PULMONOLOGY | Facility: CLINIC | Age: 70
End: 2025-02-13
Payer: COMMERCIAL

## 2025-02-13 DIAGNOSIS — J45.50 SEVERE PERSISTENT ASTHMA, UNSPECIFIED WHETHER COMPLICATED: Primary | ICD-10-CM

## 2025-02-23 ENCOUNTER — HOSPITAL ENCOUNTER (EMERGENCY)
Facility: HOSPITAL | Age: 70
Discharge: HOME | End: 2025-02-23
Payer: MEDICARE

## 2025-02-23 VITALS
DIASTOLIC BLOOD PRESSURE: 73 MMHG | RESPIRATION RATE: 12 BRPM | OXYGEN SATURATION: 94 % | HEIGHT: 62 IN | BODY MASS INDEX: 42.51 KG/M2 | HEART RATE: 70 BPM | TEMPERATURE: 97.7 F | WEIGHT: 231 LBS | SYSTOLIC BLOOD PRESSURE: 90 MMHG

## 2025-02-23 DIAGNOSIS — B34.8 RHINOVIRUS: Primary | ICD-10-CM

## 2025-02-23 LAB
B PARAPERT DNA SPEC QL NAA+PROBE: NOT DETECTED
B PERT DNA SPEC QL NAA+PROBE: NOT DETECTED
C PNEUM DNA NPH QL NAA+NON-PROBE: NOT DETECTED
FLUAV SUBTYP SPEC NAA+PROBE: NOT DETECTED
FLUBV RNA ISLT QL NAA+PROBE: NOT DETECTED
HADV DNA SPEC NAA+PROBE: NOT DETECTED
HCOV 229E RNA SPEC QL NAA+PROBE: NOT DETECTED
HCOV HKU1 RNA SPEC QL NAA+PROBE: NOT DETECTED
HCOV NL63 RNA SPEC QL NAA+PROBE: NOT DETECTED
HCOV OC43 RNA SPEC QL NAA+PROBE: NOT DETECTED
HMPV RNA NPH QL NAA+NON-PROBE: NOT DETECTED
HPIV1 RNA ISLT QL NAA+PROBE: NOT DETECTED
HPIV2 RNA SPEC QL NAA+PROBE: NOT DETECTED
HPIV3 RNA NPH QL NAA+PROBE: NOT DETECTED
HPIV4 P GENE NPH QL NAA+PROBE: NOT DETECTED
M PNEUMO IGG SER IA-ACNC: NOT DETECTED
RHINOVIRUS RNA SPEC NAA+PROBE: DETECTED
RSV RNA NPH QL NAA+NON-PROBE: NOT DETECTED
S PYO AG THROAT QL: NEGATIVE
SARS-COV-2 RNA RESP QL NAA+PROBE: NOT DETECTED

## 2025-02-23 PROCEDURE — 87880 STREP A ASSAY W/OPTIC: CPT | Performed by: PHYSICIAN ASSISTANT

## 2025-02-23 PROCEDURE — 99283 EMERGENCY DEPT VISIT LOW MDM: CPT

## 2025-02-23 PROCEDURE — 87081 CULTURE SCREEN ONLY: CPT | Performed by: PHYSICIAN ASSISTANT

## 2025-02-23 PROCEDURE — 0202U NFCT DS 22 TRGT SARS-COV-2: CPT | Performed by: PHYSICIAN ASSISTANT

## 2025-02-23 PROCEDURE — 63710000001 ONDANSETRON ODT 4 MG TABLET DISPERSIBLE: Performed by: EMERGENCY MEDICINE

## 2025-02-23 RX ORDER — DOXYCYCLINE 100 MG/1
100 CAPSULE ORAL 2 TIMES DAILY
Qty: 14 CAPSULE | Refills: 0 | Status: SHIPPED | OUTPATIENT
Start: 2025-02-23 | End: 2025-03-02

## 2025-02-23 RX ORDER — ONDANSETRON 4 MG/1
4 TABLET, ORALLY DISINTEGRATING ORAL ONCE
Status: COMPLETED | OUTPATIENT
Start: 2025-02-23 | End: 2025-02-23

## 2025-02-23 RX ORDER — GUAIFENESIN AND DEXTROMETHORPHAN HYDROBROMIDE 600; 30 MG/1; MG/1
1 TABLET, EXTENDED RELEASE ORAL 2 TIMES DAILY PRN
Qty: 14 EACH | Refills: 0 | Status: SHIPPED | OUTPATIENT
Start: 2025-02-23

## 2025-02-23 RX ORDER — HYDROCODONE BITARTRATE AND ACETAMINOPHEN 10; 325 MG/1; MG/1
1 TABLET ORAL ONCE
Status: COMPLETED | OUTPATIENT
Start: 2025-02-23 | End: 2025-02-23

## 2025-02-23 RX ORDER — DOXYCYCLINE 100 MG/1
100 CAPSULE ORAL 2 TIMES DAILY
Qty: 14 CAPSULE | Refills: 0 | Status: SHIPPED | OUTPATIENT
Start: 2025-02-23 | End: 2025-02-23

## 2025-02-23 RX ORDER — GUAIFENESIN AND DEXTROMETHORPHAN HYDROBROMIDE 600; 30 MG/1; MG/1
1 TABLET, EXTENDED RELEASE ORAL 2 TIMES DAILY PRN
Qty: 14 EACH | Refills: 0 | Status: SHIPPED | OUTPATIENT
Start: 2025-02-23 | End: 2025-02-23

## 2025-02-23 RX ORDER — IBUPROFEN 600 MG/1
600 TABLET, FILM COATED ORAL EVERY 6 HOURS PRN
Qty: 15 TABLET | Refills: 0 | Status: SHIPPED | OUTPATIENT
Start: 2025-02-23

## 2025-02-23 RX ADMIN — HYDROCODONE BITARTRATE AND ACETAMINOPHEN 1 TABLET: 10; 325 TABLET ORAL at 14:54

## 2025-02-23 RX ADMIN — ONDANSETRON 4 MG: 4 TABLET, ORALLY DISINTEGRATING ORAL at 14:54

## 2025-02-23 NOTE — ED PROVIDER NOTES
Subjective   History of Present Illness  Pt c/o sore throat since Wednesday, states she now has swelling behind her R ear and hurts from the top of her head down to her R shoulder  Pt aslo has a rash that is tender behind her ear   Denies any chills or fever     History provided by:  Patient   used: No    Sore Throat  Location:  Generalized  Quality:  Aching  Severity:  Mild  Onset quality:  Sudden  Timing:  Constant  Progression:  Worsening  Chronicity:  New  Relieved by:  Nothing  Worsened by:  Nothing  Ineffective treatments:  None tried  Associated symptoms: no chills, no drooling, no fever and no trouble swallowing        Review of Systems   Constitutional:  Negative for chills and fever.   HENT:  Positive for sore throat. Negative for drooling and trouble swallowing.        Past Medical History:   Diagnosis Date    Asthma 1978    CHF (congestive heart failure) 2021    Chronic bronchitis     Chronic kidney disease 11/1/2016    Deep vein thrombosis 2015    Diabetes mellitus     Diverticulitis     Hyperlipidemia 1999    Hypertension     Obstructive sleep apnea syndrome 4/12/2018       Allergies   Allergen Reactions    Adhesive Tape Rash    Cephalexin Hives    Cephalosporins Hives    Ciprofloxacin Hcl Hives    Penicillins Hives       Past Surgical History:   Procedure Laterality Date    ANKLE SURGERY      BACK SURGERY      CHOLECYSTECTOMY      FOOT SURGERY Left     GASTRIC STIMULATOR IMPLANT SURGERY  2017    changed battery 11/2022    HAND SURGERY      HERNIA REPAIR      HYSTERECTOMY      KNEE SURGERY Left     TUBAL ABDOMINAL LIGATION         Family History   Problem Relation Age of Onset    Osteoporosis Mother     Clotting disorder Mother     Cancer Father     Cancer Sister     Anemia Sister     Fainting Sister     Hypertension Sister     Asthma Sister     Asthma Brother     Asthma Maternal Grandfather     Heart attack Paternal Grandmother     Heart disease Paternal Grandmother        Social  History     Socioeconomic History    Marital status:    Tobacco Use    Smoking status: Former     Current packs/day: 0.00     Average packs/day: 2.0 packs/day for 28.0 years (56.0 ttl pk-yrs)     Types: Cigarettes     Start date: 1983     Quit date: 2011     Years since quittin.1     Passive exposure: Past    Smokeless tobacco: Never   Vaping Use    Vaping status: Never Used   Substance and Sexual Activity    Alcohol use: Never    Drug use: Never    Sexual activity: Not Currently     Partners: Male     Birth control/protection: Condom, Pill, Surgical, Tubal ligation, Birth control pill, Hysterectomy           Objective   Physical Exam  Vitals and nursing note reviewed.   Constitutional:       Appearance: She is well-developed.   HENT:      Head: Normocephalic.   Cardiovascular:      Rate and Rhythm: Normal rate and regular rhythm.   Pulmonary:      Effort: Pulmonary effort is normal.      Breath sounds: Normal breath sounds.   Abdominal:      General: Bowel sounds are normal.      Palpations: Abdomen is soft.      Tenderness: There is no abdominal tenderness.   Musculoskeletal:         General: Normal range of motion.      Cervical back: Neck supple.   Skin:     General: Skin is warm and dry.      Findings: Erythema present.   Neurological:      Mental Status: She is alert and oriented to person, place, and time.   Psychiatric:         Behavior: Behavior normal.         Thought Content: Thought content normal.         Judgment: Judgment normal.         Procedures           ED Course      Results for orders placed or performed during the hospital encounter of 25   Rapid Strep A Screen - Swab, Throat    Collection Time: 25 11:27 AM    Specimen: Throat; Swab   Result Value Ref Range    Strep A Ag Negative Negative   Respiratory Panel PCR w/COVID-19(SARS-CoV-2) STELLA/MARICRUZ/DYAN/PAD/COR/JOSE In-House, NP Swab in UTM/VTM, 2 HR TAT - Swab, Nasopharynx    Collection Time: 25 11:27 AM     Specimen: Nasopharynx; Swab   Result Value Ref Range    ADENOVIRUS, PCR Not Detected Not Detected    Coronavirus 229E Not Detected Not Detected    Coronavirus HKU1 Not Detected Not Detected    Coronavirus NL63 Not Detected Not Detected    Coronavirus OC43 Not Detected Not Detected    COVID19 Not Detected Not Detected - Ref. Range    Human Metapneumovirus Not Detected Not Detected    Human Rhinovirus/Enterovirus Detected (A) Not Detected    Influenza A PCR Not Detected Not Detected    Influenza B PCR Not Detected Not Detected    Parainfluenza Virus 1 Not Detected Not Detected    Parainfluenza Virus 2 Not Detected Not Detected    Parainfluenza Virus 3 Not Detected Not Detected    Parainfluenza Virus 4 Not Detected Not Detected    RSV, PCR Not Detected Not Detected    Bordetella pertussis pcr Not Detected Not Detected    Bordetella parapertussis PCR Not Detected Not Detected    Chlamydophila pneumoniae PCR Not Detected Not Detected    Mycoplasma pneumo by PCR Not Detected Not Detected                                                     Medical Decision Making  Problems Addressed:  Rhinovirus: complicated acute illness or injury    Risk  OTC drugs.  Prescription drug management.        Final diagnoses:   Rhinovirus       ED Disposition  ED Disposition       ED Disposition   Discharge    Condition   Stable    Comment   --               Nela Shaw MD  [address] [address]  [phone]    In 2 days           Medication List        New Prescriptions      doxycycline 100 MG capsule  Commonly known as: MONODOX  Take 1 capsule by mouth 2 (Two) Times a Day for 7 days.     guaifenesin-dextromethorphan 600-30 mg  MG tablet sustained-release 12 hour  Take 1 tablet by mouth 2 (Two) Times a Day As Needed (drainage and cough).     ibuprofen 600 MG tablet  Commonly known as: ADVIL,MOTRIN  Take 1 tablet by mouth Every 6 (Six) Hours As Needed for Mild Pain.               Where to Get Your  Medications        These medications were sent to [place] [address] - [phone]  - [phone] FX  [address] [address]      Phone: [phone]   ibuprofen 600 MG tablet       These medications were sent to [address] - [phone]  - [phone]   [address] [facility] [address]      Phone: [phone]   doxycycline 100 MG capsule  guaifenesin-dextromethorphan 600-30 mg  MG tablet sustained-release 12 hour            Chloé Estrada PA  02/23/25 1445

## 2025-02-25 LAB — BACTERIA SPEC AEROBE CULT: NORMAL

## 2025-02-26 ENCOUNTER — OFFICE VISIT (OUTPATIENT)
Dept: PULMONOLOGY | Facility: CLINIC | Age: 70
End: 2025-02-26
Payer: COMMERCIAL

## 2025-02-26 ENCOUNTER — HOSPITAL ENCOUNTER (OUTPATIENT)
Dept: ULTRASOUND IMAGING | Facility: HOSPITAL | Age: 70
Discharge: HOME | End: 2025-02-26
Admitting: INTERNAL MEDICINE
Payer: COMMERCIAL

## 2025-02-26 VITALS
HEART RATE: 85 BPM | SYSTOLIC BLOOD PRESSURE: 138 MMHG | OXYGEN SATURATION: 91 % | HEIGHT: 62 IN | BODY MASS INDEX: 44.5 KG/M2 | WEIGHT: 241.8 LBS | TEMPERATURE: 97.9 F | DIASTOLIC BLOOD PRESSURE: 76 MMHG

## 2025-02-26 DIAGNOSIS — J45.50 SEVERE PERSISTENT ASTHMA, UNSPECIFIED WHETHER COMPLICATED: Primary | ICD-10-CM

## 2025-02-26 DIAGNOSIS — Z87.891 FORMER SMOKER: ICD-10-CM

## 2025-02-26 DIAGNOSIS — R91.8 MULTIPLE PULMONARY NODULES: ICD-10-CM

## 2025-02-26 DIAGNOSIS — R09.89 DECREASED CAROTID PULSE: ICD-10-CM

## 2025-02-26 DIAGNOSIS — J96.11 CHRONIC RESPIRATORY FAILURE WITH HYPOXIA: ICD-10-CM

## 2025-02-26 PROCEDURE — 99214 OFFICE O/P EST MOD 30 MIN: CPT | Performed by: PHYSICIAN ASSISTANT

## 2025-02-26 PROCEDURE — 93880 EXTRACRANIAL BILAT STUDY: CPT

## 2025-02-26 NOTE — PROGRESS NOTES
"Chief Complaint  Severe persistent asthma, unspecified whether complicated    Subjective        Josefina Cheng presents to [facility]  History of Present Illness    Patient presents today for follow-up of asthma.  Acquired shingles last week. Initially had some sinus symptoms (recent testing notable for rhinovirus), but then started developing the pain.  About 10 days out from onset today.    Otherwise breathing doing better and stable at this time. Feel that breathing has improved over time with current therapies. No recent change in therapies.      Objective   Vital Signs:  /76   Pulse 85   Temp 97.9 °F (36.6 °C)   Ht 157.5 cm (62.01\")   Wt 110 kg (241 lb 12.8 oz)   SpO2 91%   BMI 44.21 kg/m²   Estimated body mass index is 44.21 kg/m² as calculated from the following:    Height as of this encounter: 157.5 cm (62.01\").    Weight as of this encounter: 110 kg (241 lb 12.8 oz).        Physical Exam  Vitals reviewed.   Constitutional:       General: She is not in acute distress.  Cardiovascular:      Rate and Rhythm: Normal rate and regular rhythm.   Pulmonary:      Effort: Pulmonary effort is normal.      Breath sounds: No wheezing, rhonchi or rales.   Neurological:      Mental Status: She is alert and oriented to person, place, and time.   Psychiatric:         Behavior: Behavior normal.        Result Review :  The following data was reviewed by: Diane Vale PA-C on 02/26/2025:    CT chest report February 2024      -----------------------------------------------------------------------------------    Spirometry 2022    Spirometry suggest restriction significant bronchodilator effect seen on this occasion.  Diffusion capacity is normal.  Lung volumes show significant air trapping.  Flow volume loop is normal.       -----------------------------------------------------------------------------------    CT chest imaging/report November " 2023        -----------------------------------------------------------------------------------             Assessment and Plan   Diagnoses and all orders for this visit:    1. Severe persistent asthma, unspecified whether complicated (Primary)    2. Chronic respiratory failure with hypoxia    3. Former smoker  -      CT Chest Low Dose Cancer Screening WO; Future    4. Multiple pulmonary nodules  -      CT Chest Low Dose Cancer Screening WO; Future        Severe persistent asthma:   Continue albuterol as needed  Continue DuoNebs as needed  Continue Symbicort 160 mcg as scheduled  Spiriva previously tried, not effective.   Other LAMA agents awaited at this time  Continue Tezspire as scheduled - does provide symptom maintenance.   Will consider PFT at the next visit.           Chronic hypoxic respiratory failure:   Continues oxygen PRN and at nighttime on 2 L.   Goal SPO2 > 88%.           Pulmonary nodules,   Former smoker:   Smoking cessation achieved around 10-12 years ago, with 28 year history and 2 ppd average.   CT imaging in November 2023 notable for stable right lower linear scarring/atelectasis, with some interval ground glass changes of the right side and new left lower opacities/atelectasis (6 mm and 9 mm)    Last CT imaging in November 2023 was notable for stable right lower linear scarring/atelectasis, with some interval ground glass changes of the right side, and new left lower opacities (6 and 9 mm) on the 11/22/2023 imaging.     Obtained report initially, then imaging from February 2024  - interval resolution of ground glass changes  - partial improvement/thinning of the linear scarring of the right lung  - left lower 6 mm opacity resolved  - left lower 9 mm opacity now 5 x 6 mm (but appears more like scarring/atelectasis)  - new 3-4 mm left lower ground glass nodule, image 35  Ordered new LDCT         Follow Up   Return in about 6 months (around 8/26/2025), or if symptoms worsen or fail to improve, for  Recheck.  Patient was given instructions and counseling regarding her condition or for health maintenance advice. Please see specific information pulled into the AVS if appropriate.              rehabilitation facility/subacute

## 2025-03-13 ENCOUNTER — TELEPHONE (OUTPATIENT)
Dept: PULMONOLOGY | Facility: CLINIC | Age: 70
End: 2025-03-13
Payer: COMMERCIAL

## 2025-03-13 NOTE — TELEPHONE ENCOUNTER
We did not receive pt's Tezspire injection. I spoke with [facility] and requested overnight delivery. Shot should be here tomorrow on 3/14. Attempted to contact pt to advise but I got her VM.

## 2025-03-28 ENCOUNTER — CLINICAL SUPPORT (OUTPATIENT)
Dept: PULMONOLOGY | Facility: CLINIC | Age: 70
End: 2025-03-28
Payer: COMMERCIAL

## 2025-03-28 DIAGNOSIS — J45.50 SEVERE PERSISTENT ASTHMA, UNSPECIFIED WHETHER COMPLICATED: Primary | ICD-10-CM

## 2025-03-31 ENCOUNTER — HOSPITAL ENCOUNTER (OUTPATIENT)
Dept: CT IMAGING | Facility: HOSPITAL | Age: 70
Discharge: HOME | End: 2025-03-31
Admitting: PHYSICIAN ASSISTANT
Payer: COMMERCIAL

## 2025-03-31 DIAGNOSIS — R91.8 MULTIPLE PULMONARY NODULES: ICD-10-CM

## 2025-03-31 DIAGNOSIS — Z87.891 FORMER SMOKER: ICD-10-CM

## 2025-03-31 PROCEDURE — 71271 CT THORAX LUNG CANCER SCR C-: CPT

## 2025-03-31 PROCEDURE — 71271 CT THORAX LUNG CANCER SCR C-: CPT | Performed by: RADIOLOGY

## 2025-04-04 DIAGNOSIS — R00.2 PALPITATIONS: ICD-10-CM

## 2025-04-04 RX ORDER — METOPROLOL TARTRATE 50 MG
TABLET ORAL
Qty: 60 TABLET | Refills: 0 | Status: SHIPPED | OUTPATIENT
Start: 2025-04-04

## 2025-04-23 DIAGNOSIS — R00.2 PALPITATIONS: ICD-10-CM

## 2025-04-23 RX ORDER — METOPROLOL TARTRATE 50 MG
TABLET ORAL
Qty: 60 TABLET | Refills: 4 | Status: SHIPPED | OUTPATIENT
Start: 2025-04-23

## 2025-05-09 ENCOUNTER — TELEPHONE (OUTPATIENT)
Dept: PULMONOLOGY | Facility: CLINIC | Age: 70
End: 2025-05-09
Payer: COMMERCIAL

## 2025-05-09 DIAGNOSIS — Z87.891 FORMER SMOKER: ICD-10-CM

## 2025-05-09 DIAGNOSIS — R93.89 ABNORMAL CT OF THE CHEST: Primary | ICD-10-CM

## 2025-05-09 NOTE — TELEPHONE ENCOUNTER
Updated with CT results:   November 2024 compared with March 2025    - Right linear scarring/atelectasis stable  - Left base 9 mm nodule resolved, now minimal linear scarring present (image 107)  - Left 3 to 4 mm groundglass nodule resolved (image 89)  - Left lingular opacity slightly more dense than previous scan (image 86)      Will repeat CT in 6 months (September 2025)

## 2025-05-23 ENCOUNTER — CLINICAL SUPPORT (OUTPATIENT)
Dept: PULMONOLOGY | Facility: CLINIC | Age: 70
End: 2025-05-23
Payer: COMMERCIAL

## 2025-05-23 DIAGNOSIS — J45.50 SEVERE PERSISTENT ASTHMA, UNSPECIFIED WHETHER COMPLICATED: Primary | ICD-10-CM

## 2025-05-28 RX ORDER — TEZEPELUMAB-EKKO 210 MG/1.9ML
INJECTION, SOLUTION SUBCUTANEOUS
Qty: 2 EACH | Refills: 4 | Status: SHIPPED | OUTPATIENT
Start: 2025-05-28

## 2025-07-09 ENCOUNTER — TELEPHONE (OUTPATIENT)
Dept: CARDIOLOGY | Facility: CLINIC | Age: 70
End: 2025-07-09
Payer: COMMERCIAL

## 2025-07-09 NOTE — TELEPHONE ENCOUNTER
Hub to relay.     Called pt to advise her to have the office fax us a request if they are needing a clearance from us with what they are doing and what anesthesia they are using. No answer MARY BETH.

## 2025-07-09 NOTE — TELEPHONE ENCOUNTER
REQUEST FOR CARDIAC CLEARANCE    Caller name: Josefina Cheng     Phone Number: [phone]    Surgeon's name: CHAD LUDWIG    Type of planned surgery: ESD, EUS, ERCT    Date of planned surgery: 08-    Type of anesthesia: GENERAL    Have you been experiencing chest pain or shortness of breath? YES    Is your doctor requesting for you to stop any of your medications prior to your surgery? PLAVIX 7 DAYS BEFORE    Where should we fax the clearance to? [phone]

## 2025-07-10 ENCOUNTER — TELEPHONE (OUTPATIENT)
Dept: CARDIOLOGY | Facility: CLINIC | Age: 70
End: 2025-07-10
Payer: COMMERCIAL

## 2025-07-11 NOTE — TELEPHONE ENCOUNTER
Cardiac Risk Assessment  Procedure: colonoscopy and EGD.  LS: 12/12/24  F/up:12/12/25  Stress: 4/18/23  Echo: 4/23/23  Cath:   Stress echo:  CTA:  Event:1/17/23  Holter:       Form filled out and placed on Jay's desk.

## 2025-07-11 NOTE — PROGRESS NOTES
Reviewed her chart for cardiac risk assessment for colonoscopy and EGD.  She is on Plavix this was originally started by me for recurrent chest pains this is since resolved.  She has no definitive evidence of coronary artery disease given this I do not think she needs to continue Plavix at this time.  I will go ahead and discontinue this.  She can proceed with colonoscopy/EGD with low cardiovascular risk    Jay Jones PA-C

## 2025-07-11 NOTE — TELEPHONE ENCOUNTER
Hub to relay.     Called pt to advise her that Jay has sign off on her EGD and colonoscopy. She will need to hold her plavix 5 days prior and they will advise her when to restart. No answer MARY BETH.

## 2025-07-15 ENCOUNTER — CLINICAL SUPPORT (OUTPATIENT)
Dept: PULMONOLOGY | Facility: CLINIC | Age: 70
End: 2025-07-15
Payer: COMMERCIAL

## 2025-07-15 DIAGNOSIS — J45.50 SEVERE PERSISTENT ASTHMA, UNSPECIFIED WHETHER COMPLICATED: Primary | ICD-10-CM
